# Patient Record
Sex: FEMALE | Race: WHITE | NOT HISPANIC OR LATINO | Employment: FULL TIME | URBAN - NONMETROPOLITAN AREA
[De-identification: names, ages, dates, MRNs, and addresses within clinical notes are randomized per-mention and may not be internally consistent; named-entity substitution may affect disease eponyms.]

---

## 2017-01-09 ENCOUNTER — TRANSCRIBE ORDERS (OUTPATIENT)
Dept: ADMINISTRATIVE | Facility: HOSPITAL | Age: 62
End: 2017-01-09

## 2017-01-09 ENCOUNTER — LAB (OUTPATIENT)
Dept: LAB | Facility: HOSPITAL | Age: 62
End: 2017-01-09

## 2017-01-09 DIAGNOSIS — R50.9 FEVER, UNSPECIFIED: ICD-10-CM

## 2017-01-09 DIAGNOSIS — R50.9 FEVER, UNSPECIFIED: Primary | ICD-10-CM

## 2017-01-09 LAB
FLUAV AG NPH QL: NEGATIVE
FLUBV AG NPH QL IA: NEGATIVE

## 2017-01-09 PROCEDURE — 87804 INFLUENZA ASSAY W/OPTIC: CPT

## 2017-04-25 ENCOUNTER — LAB (OUTPATIENT)
Dept: LAB | Facility: HOSPITAL | Age: 62
End: 2017-04-25

## 2017-04-25 ENCOUNTER — TRANSCRIBE ORDERS (OUTPATIENT)
Dept: LAB | Facility: HOSPITAL | Age: 62
End: 2017-04-25

## 2017-04-25 DIAGNOSIS — I10 ESSENTIAL (PRIMARY) HYPERTENSION: Primary | ICD-10-CM

## 2017-04-25 DIAGNOSIS — I10 ESSENTIAL (PRIMARY) HYPERTENSION: ICD-10-CM

## 2017-04-25 LAB
ALBUMIN SERPL-MCNC: 4.6 G/DL (ref 3.5–5)
ALBUMIN/GLOB SERPL: 1.2 G/DL (ref 1.1–2.5)
ALP SERPL-CCNC: 97 U/L (ref 24–120)
ALT SERPL W P-5'-P-CCNC: 26 U/L (ref 0–54)
ANION GAP SERPL CALCULATED.3IONS-SCNC: 13 MMOL/L (ref 4–13)
AST SERPL-CCNC: 26 U/L (ref 7–45)
AUTO MIXED CELLS #: 1 10*3/UL (ref 0.1–2.6)
AUTO MIXED CELLS %: 7.7 % (ref 0.1–24)
BILIRUB SERPL-MCNC: 0.5 MG/DL (ref 0.1–1)
BILIRUB UR QL STRIP: NEGATIVE
BUN BLD-MCNC: 19 MG/DL (ref 5–21)
BUN/CREAT SERPL: 18.6
CALCIUM SPEC-SCNC: 9.8 MG/DL (ref 8.4–10.4)
CHLORIDE SERPL-SCNC: 101 MMOL/L (ref 98–110)
CHOLEST SERPL-MCNC: 242 MG/DL (ref 130–200)
CLARITY UR: CLEAR
CO2 SERPL-SCNC: 28 MMOL/L (ref 24–31)
COLOR UR: YELLOW
CREAT BLD-MCNC: 1.02 MG/DL (ref 0.5–1.4)
ERYTHROCYTE [DISTWIDTH] IN BLOOD BY AUTOMATED COUNT: 12.8 % (ref 12–15)
GFR SERPL CREATININE-BSD FRML MDRD: 55 ML/MIN/1.73
GLOBULIN UR ELPH-MCNC: 3.7 GM/DL
GLUCOSE BLD-MCNC: 90 MG/DL (ref 70–100)
GLUCOSE UR STRIP-MCNC: NEGATIVE MG/DL
HBA1C MFR BLD: 5.8 %
HCT VFR BLD AUTO: 42 % (ref 37–47)
HDLC SERPL-MCNC: 45 MG/DL
HGB BLD-MCNC: 14.5 G/DL (ref 12–16)
HGB UR QL STRIP.AUTO: NEGATIVE
KETONES UR QL STRIP: NEGATIVE
LDLC SERPL CALC-MCNC: 136 MG/DL (ref 0–99)
LDLC/HDLC SERPL: 3.03 {RATIO}
LEUKOCYTE ESTERASE UR QL STRIP.AUTO: NEGATIVE
LYMPHOCYTES # BLD AUTO: 3.2 10*3/MM3 (ref 0.8–7)
LYMPHOCYTES NFR BLD AUTO: 25.7 % (ref 15–45)
MCH RBC QN AUTO: 30.2 PG (ref 28–32)
MCHC RBC AUTO-ENTMCNC: 34.5 G/DL (ref 33–36)
MCV RBC AUTO: 87.5 FL (ref 82–98)
NEUTROPHILS # BLD AUTO: 8.2 10*3/MM3 (ref 1.5–8.3)
NEUTROPHILS NFR BLD AUTO: 66.6 % (ref 39–78)
NITRITE UR QL STRIP: NEGATIVE
PH UR STRIP.AUTO: 6.5 [PH] (ref 5–8)
PLATELET # BLD AUTO: 278 10*3/MM3 (ref 130–400)
PMV BLD AUTO: 10.2 FL (ref 6–12)
POTASSIUM BLD-SCNC: 3.7 MMOL/L (ref 3.5–5.3)
PROT SERPL-MCNC: 8.3 G/DL (ref 6.3–8.7)
PROT UR QL STRIP: NEGATIVE
RBC # BLD AUTO: 4.8 10*6/MM3 (ref 4.2–5.4)
SODIUM BLD-SCNC: 142 MMOL/L (ref 135–145)
SP GR UR STRIP: 1.02 (ref 1–1.03)
TRIGL SERPL-MCNC: 303 MG/DL (ref 0–149)
UROBILINOGEN UR QL STRIP: NORMAL
VLDLC SERPL-MCNC: 60.6 MG/DL
WBC NRBC COR # BLD: 12.4 10*3/MM3 (ref 4.8–10.8)

## 2017-04-25 PROCEDURE — 83036 HEMOGLOBIN GLYCOSYLATED A1C: CPT

## 2017-04-25 PROCEDURE — 85025 COMPLETE CBC W/AUTO DIFF WBC: CPT

## 2017-04-25 PROCEDURE — 80053 COMPREHEN METABOLIC PANEL: CPT

## 2017-04-25 PROCEDURE — 80061 LIPID PANEL: CPT

## 2017-04-25 PROCEDURE — 81003 URINALYSIS AUTO W/O SCOPE: CPT

## 2017-04-25 PROCEDURE — 36415 COLL VENOUS BLD VENIPUNCTURE: CPT

## 2017-06-30 ENCOUNTER — TRANSCRIBE ORDERS (OUTPATIENT)
Dept: LAB | Facility: HOSPITAL | Age: 62
End: 2017-06-30

## 2017-06-30 ENCOUNTER — HOSPITAL ENCOUNTER (OUTPATIENT)
Dept: GENERAL RADIOLOGY | Facility: HOSPITAL | Age: 62
Discharge: HOME | End: 2017-06-30
Admitting: NURSE PRACTITIONER

## 2017-06-30 DIAGNOSIS — R07.81 PLEURITIC CHEST PAIN: ICD-10-CM

## 2017-06-30 DIAGNOSIS — R07.81 PLEURITIC CHEST PAIN: Primary | ICD-10-CM

## 2017-06-30 PROCEDURE — 71020 HC CHEST PA AND LATERAL: CPT

## 2018-09-05 ENCOUNTER — HOSPITAL ENCOUNTER (EMERGENCY)
Age: 63
Discharge: HOME | End: 2018-09-05
Payer: COMMERCIAL

## 2018-09-05 VITALS
BODY MASS INDEX: 28.93 KG/M2 | WEIGHT: 180 LBS | HEART RATE: 91 BPM | HEIGHT: 66 IN | SYSTOLIC BLOOD PRESSURE: 139 MMHG | RESPIRATION RATE: 18 BRPM | DIASTOLIC BLOOD PRESSURE: 84 MMHG | OXYGEN SATURATION: 96 % | TEMPERATURE: 98.3 F

## 2018-09-05 DIAGNOSIS — T78.40XA ALLERGIC REACTION, INITIAL ENCOUNTER: Primary | ICD-10-CM

## 2018-09-05 PROCEDURE — 99283 EMERGENCY DEPT VISIT LOW MDM: CPT | Performed by: PHYSICIAN ASSISTANT

## 2018-09-05 PROCEDURE — 96372 THER/PROPH/DIAG INJ SC/IM: CPT

## 2018-09-05 PROCEDURE — 6370000000 HC RX 637 (ALT 250 FOR IP): Performed by: PHYSICIAN ASSISTANT

## 2018-09-05 PROCEDURE — 99282 EMERGENCY DEPT VISIT SF MDM: CPT

## 2018-09-05 PROCEDURE — 6360000002 HC RX W HCPCS: Performed by: PHYSICIAN ASSISTANT

## 2018-09-05 RX ORDER — DEXAMETHASONE SODIUM PHOSPHATE 10 MG/ML
6 INJECTION, SOLUTION INTRAMUSCULAR; INTRAVENOUS ONCE
Status: COMPLETED | OUTPATIENT
Start: 2018-09-05 | End: 2018-09-05

## 2018-09-05 RX ORDER — DIPHENHYDRAMINE HYDROCHLORIDE 50 MG/ML
25 INJECTION INTRAMUSCULAR; INTRAVENOUS ONCE
Status: COMPLETED | OUTPATIENT
Start: 2018-09-05 | End: 2018-09-05

## 2018-09-05 RX ORDER — DIPHENHYDRAMINE HCL 25 MG
25 CAPSULE ORAL EVERY 6 HOURS PRN
Qty: 20 CAPSULE | Refills: 0 | Status: SHIPPED | OUTPATIENT
Start: 2018-09-05 | End: 2018-09-08

## 2018-09-05 RX ORDER — LISINOPRIL 20 MG/1
20 TABLET ORAL DAILY
COMMUNITY

## 2018-09-05 RX ORDER — PREDNISONE 10 MG/1
TABLET ORAL
Qty: 40 TABLET | Refills: 0 | Status: SHIPPED | OUTPATIENT
Start: 2018-09-05 | End: 2020-01-12

## 2018-09-05 RX ORDER — FLUOXETINE HYDROCHLORIDE 20 MG/1
20 CAPSULE ORAL DAILY
COMMUNITY

## 2018-09-05 RX ORDER — HYDROCODONE BITARTRATE AND ACETAMINOPHEN 5; 325 MG/1; MG/1
1 TABLET ORAL ONCE
Status: COMPLETED | OUTPATIENT
Start: 2018-09-05 | End: 2018-09-05

## 2018-09-05 RX ADMIN — HYDROCODONE BITARTRATE AND ACETAMINOPHEN 1 TABLET: 5; 325 TABLET ORAL at 07:24

## 2018-09-05 RX ADMIN — DEXAMETHASONE SODIUM PHOSPHATE 6 MG: 10 INJECTION, SOLUTION INTRAMUSCULAR; INTRAVENOUS at 07:18

## 2018-09-05 RX ADMIN — DIPHENHYDRAMINE HYDROCHLORIDE 25 MG: 50 INJECTION, SOLUTION INTRAMUSCULAR; INTRAVENOUS at 07:18

## 2018-09-05 ASSESSMENT — ENCOUNTER SYMPTOMS
ABDOMINAL DISTENTION: 0
EYE PAIN: 0
WHEEZING: 0
DIARRHEA: 0
NAUSEA: 0
SINUS PRESSURE: 0
FACIAL SWELLING: 1
SHORTNESS OF BREATH: 0
ABDOMINAL PAIN: 0
VOMITING: 0
COUGH: 0
RHINORRHEA: 0
SORE THROAT: 0
APNEA: 0
CONSTIPATION: 0
CHEST TIGHTNESS: 0

## 2018-09-05 ASSESSMENT — PAIN SCALES - GENERAL: PAINLEVEL_OUTOF10: 6

## 2018-09-05 NOTE — ED PROVIDER NOTES
eMERGENCY dEPARTMENT eNCOUnter      Pt Name: Clint Hansen  MRN: [id number]  [place] 1955  Date of evaluation: 9/5/2018  Provider: Debby Ramirez, [facility] (formerly [facility])       Chief Complaint   Patient presents with    Facial Swelling     began yesterday morning         HISTORY OF PRESENT ILLNESS  (Location/Symptom, Timing/Onset, Context/Setting, Quality, Duration, Modifying Factors, Severity.)   Clint Hansen is a 66 y.o. female who presents to the emergency department With facial swelling that began yesterday morning. Patient states she has been working outside in her yard with her . She typically overreacts when exposed to poison ivy. She does have some poison ivy rash on her right arm. She also takes lisinopril. She denies any shortness of breath, no difficulty breathing or significant swelling of the lips or tongue. Denies any visual disturbances. No fevers at home. Nursing Notes were reviewed and I agree. REVIEW OF SYSTEMS    (2-9 systems for level 4, 10 or more for level 5)     Review of Systems   Constitutional: Negative for activity change, chills, fatigue and fever. HENT: Positive for facial swelling. Negative for ear pain, hearing loss, postnasal drip, rhinorrhea, sinus pressure and sore throat. Eyes: Negative for pain and visual disturbance. Respiratory: Negative for apnea, cough, chest tightness, shortness of breath and wheezing. Cardiovascular: Negative for chest pain. Gastrointestinal: Negative for abdominal distention, abdominal pain, constipation, diarrhea, nausea and vomiting. Genitourinary: Negative for difficulty urinating, dysuria, flank pain and hematuria. Musculoskeletal: Negative for arthralgias and joint swelling. Skin: Negative for rash. Neurological: Negative for dizziness, syncope, light-headedness and headaches. Psychiatric/Behavioral: Negative for agitation and confusion.         Except as noted above the remainder of the review of systems was reviewed and negative. PAST MEDICAL HISTORY     Past Medical History:   Diagnosis Date    Hypertension          SURGICAL HISTORY       Past Surgical History:   Procedure Laterality Date     SECTION           CURRENT MEDICATIONS       Discharge Medication List as of 2018  7:37 AM      CONTINUE these medications which have NOT CHANGED    Details   lisinopril (PRINIVIL;ZESTRIL) 20 MG tablet Take 20 mg by mouth dailyHistorical Med      FLUoxetine (PROZAC) 20 MG capsule Take 20 mg by mouth dailyHistorical Med             ALLERGIES     Patient has no known allergies. FAMILY HISTORY     History reviewed. No pertinent family history. SOCIAL HISTORY       Social History     Social History    Marital status:      Spouse name: N/A    Number of children: N/A    Years of education: N/A     Social History Main Topics    Smoking status: Never Smoker    Smokeless tobacco: Never Used    Alcohol use No    Drug use: No    Sexual activity: Not Asked     Other Topics Concern    None     Social History Narrative    None       SCREENINGS           PHYSICAL EXAM    (up to 7 for level 4, 8 or more for level 5)     ED Triage Vitals [18 0702]   BP Temp Temp Source Pulse Resp SpO2 Height Weight   (!) 182/89 98.3 °F (36.8 °C) Oral 91 18 98 % 5' 6\" (1.676 m) 180 lb (81.6 kg)       Physical Exam   Constitutional: She is oriented to person, place, and time. She appears well-developed and well-nourished. No distress. HENT:   Head: Normocephalic and atraumatic. Head is with right periorbital erythema and with left periorbital erythema. Mouth/Throat: Uvula is midline and mucous membranes are normal. No oropharyngeal exudate, posterior oropharyngeal edema, posterior oropharyngeal erythema or tonsillar abscesses. Cardiovascular: Normal rate, regular rhythm and normal heart sounds. Exam reveals no gallop and no friction rub. No murmur heard.   Pulmonary/Chest: symptoms develop they are to return to the emergency department immediately. Patient was educated on diagnosis and treatment plan. All of patient's questions were answered, and the patient understands the discharge plan. I do not feel the patient has a life-threatening condition at this time. Patient is to be discharged.        PATIENT REFERRED TO:  Luz Maria Taylor MD  [address] [id number]  [phone]    Schedule an appointment as soon as possible for a visit   Discuss lisinopril use      DISCHARGE MEDICATIONS:  Discharge Medication List as of 9/5/2018  7:37 AM      START taking these medications    Details   diphenhydrAMINE (BENADRYL) 25 MG capsule Take 1 capsule by mouth every 6 hours as needed for Itching, Disp-20 capsule, R-0Print      predniSONE (DELTASONE) 10 MG tablet Take 5 tablets by mouth on days 1-2, Take 4 tablets by mouth on days 3-4, Take 3 tablets by mouth on days 5-6, Take 2 tablets by mouth on days 7-8, Take 1 tablets by mouth on days 9-10, Disp-40 tablet, R-0Print             (Please note that portions of this note were completed with a voice recognition program.  Efforts were made to edit the dictations but occasionally words are mis-transcribed.)    ÁLVARO Long PA  09/05/18 [id number]

## 2019-07-19 ENCOUNTER — LAB (OUTPATIENT)
Dept: LAB | Facility: HOSPITAL | Age: 64
End: 2019-07-19

## 2019-07-19 ENCOUNTER — TRANSCRIBE ORDERS (OUTPATIENT)
Dept: ADMINISTRATIVE | Facility: HOSPITAL | Age: 64
End: 2019-07-19

## 2019-07-19 DIAGNOSIS — I10 ESSENTIAL HYPERTENSION, MALIGNANT: ICD-10-CM

## 2019-07-19 DIAGNOSIS — I10 ESSENTIAL HYPERTENSION, MALIGNANT: Primary | ICD-10-CM

## 2019-07-19 LAB
ALBUMIN SERPL-MCNC: 4.3 G/DL (ref 3.5–5)
ALBUMIN/GLOB SERPL: 1.2 G/DL (ref 1.1–2.5)
ALP SERPL-CCNC: 110 U/L (ref 24–120)
ALT SERPL W P-5'-P-CCNC: 23 U/L (ref 0–54)
ANION GAP SERPL CALCULATED.3IONS-SCNC: 7 MMOL/L (ref 4–13)
AST SERPL-CCNC: 23 U/L (ref 7–45)
AUTO MIXED CELLS #: 1.3 10*3/MM3 (ref 0.1–2.6)
AUTO MIXED CELLS %: 12.7 % (ref 0.1–24)
BACTERIA UR QL AUTO: ABNORMAL /HPF
BILIRUB SERPL-MCNC: 0.4 MG/DL (ref 0.1–1)
BILIRUB UR QL STRIP: NEGATIVE
BUN BLD-MCNC: 12 MG/DL (ref 5–21)
BUN/CREAT SERPL: 15.8
CALCIUM SPEC-SCNC: 9.7 MG/DL (ref 8.4–10.4)
CHLORIDE SERPL-SCNC: 106 MMOL/L (ref 98–110)
CHOLEST SERPL-MCNC: 260 MG/DL (ref 130–200)
CLARITY UR: CLEAR
CO2 SERPL-SCNC: 26 MMOL/L (ref 24–31)
COLOR UR: YELLOW
CREAT BLD-MCNC: 0.76 MG/DL (ref 0.5–1.4)
ERYTHROCYTE [DISTWIDTH] IN BLOOD BY AUTOMATED COUNT: 13.2 % (ref 12–15)
GFR SERPL CREATININE-BSD FRML MDRD: 77 ML/MIN/1.73
GLOBULIN UR ELPH-MCNC: 3.5 GM/DL
GLUCOSE BLD-MCNC: 102 MG/DL (ref 70–100)
GLUCOSE UR STRIP-MCNC: NEGATIVE MG/DL
HBA1C MFR BLD: 5.9 %
HCT VFR BLD AUTO: 42.9 % (ref 37–47)
HDLC SERPL-MCNC: 43 MG/DL
HGB BLD-MCNC: 14.4 G/DL (ref 12–16)
HGB UR QL STRIP.AUTO: NEGATIVE
HYALINE CASTS UR QL AUTO: ABNORMAL /LPF
KETONES UR QL STRIP: NEGATIVE
LDLC SERPL CALC-MCNC: 159 MG/DL (ref 0–99)
LDLC/HDLC SERPL: 3.69 {RATIO}
LEUKOCYTE ESTERASE UR QL STRIP.AUTO: ABNORMAL
LYMPHOCYTES # BLD AUTO: 2.2 10*3/MM3 (ref 0.7–3.1)
LYMPHOCYTES NFR BLD AUTO: 20.8 % (ref 15–45)
MCH RBC QN AUTO: 29 PG (ref 28–32)
MCHC RBC AUTO-ENTMCNC: 33.6 G/DL (ref 33–36)
MCV RBC AUTO: 86.3 FL (ref 82–98)
NEUTROPHILS # BLD AUTO: 6.9 10*3/MM3 (ref 1.5–8.3)
NEUTROPHILS NFR BLD AUTO: 66.5 % (ref 39–78)
NITRITE UR QL STRIP: NEGATIVE
PH UR STRIP.AUTO: 6 [PH] (ref 5–8)
PLATELET # BLD AUTO: 295 10*3/MM3 (ref 130–400)
PMV BLD AUTO: 10.1 FL (ref 6–12)
POTASSIUM BLD-SCNC: 4.1 MMOL/L (ref 3.5–5.3)
PROT SERPL-MCNC: 7.8 G/DL (ref 6.3–8.7)
PROT UR QL STRIP: NEGATIVE
RBC # BLD AUTO: 4.97 10*6/MM3 (ref 4.2–5.4)
RBC # UR: ABNORMAL /HPF
REF LAB TEST METHOD: ABNORMAL
SODIUM BLD-SCNC: 139 MMOL/L (ref 135–145)
SP GR UR STRIP: 1.02 (ref 1–1.03)
SQUAMOUS #/AREA URNS HPF: ABNORMAL /HPF
TRIGL SERPL-MCNC: 291 MG/DL (ref 0–149)
TSH SERPL DL<=0.05 MIU/L-ACNC: 3.79 MIU/ML (ref 0.47–4.68)
UROBILINOGEN UR QL STRIP: ABNORMAL
VLDLC SERPL-MCNC: 58.2 MG/DL
WBC NRBC COR # BLD: 10.4 10*3/MM3 (ref 4.8–10.8)
WBC UR QL AUTO: ABNORMAL /HPF

## 2019-07-19 PROCEDURE — 83036 HEMOGLOBIN GLYCOSYLATED A1C: CPT

## 2019-07-19 PROCEDURE — 80061 LIPID PANEL: CPT

## 2019-07-19 PROCEDURE — 80053 COMPREHEN METABOLIC PANEL: CPT | Performed by: PEDIATRICS

## 2019-07-19 PROCEDURE — 36415 COLL VENOUS BLD VENIPUNCTURE: CPT | Performed by: PEDIATRICS

## 2019-07-19 PROCEDURE — 84443 ASSAY THYROID STIM HORMONE: CPT | Performed by: PEDIATRICS

## 2019-07-19 PROCEDURE — 85025 COMPLETE CBC W/AUTO DIFF WBC: CPT | Performed by: PEDIATRICS

## 2019-07-19 PROCEDURE — 81001 URINALYSIS AUTO W/SCOPE: CPT

## 2020-01-12 ENCOUNTER — APPOINTMENT (OUTPATIENT)
Dept: GENERAL RADIOLOGY | Age: 65
End: 2020-01-12

## 2020-01-12 ENCOUNTER — HOSPITAL ENCOUNTER (EMERGENCY)
Age: 65
Discharge: HOME | End: 2020-01-12

## 2020-01-12 VITALS
HEART RATE: 88 BPM | OXYGEN SATURATION: 95 % | SYSTOLIC BLOOD PRESSURE: 148 MMHG | TEMPERATURE: 98.2 F | HEIGHT: 66 IN | RESPIRATION RATE: 18 BRPM | DIASTOLIC BLOOD PRESSURE: 89 MMHG | WEIGHT: 180 LBS | BODY MASS INDEX: 28.93 KG/M2

## 2020-01-12 LAB
D DIMER: 0.66 UG/ML FEU (ref 0–0.48)
TROPONIN: <0.01 NG/ML (ref 0–0.03)

## 2020-01-12 PROCEDURE — 99284 EMERGENCY DEPT VISIT MOD MDM: CPT

## 2020-01-12 PROCEDURE — 99283 EMERGENCY DEPT VISIT LOW MDM: CPT

## 2020-01-12 PROCEDURE — 36415 COLL VENOUS BLD VENIPUNCTURE: CPT

## 2020-01-12 PROCEDURE — 73080 X-RAY EXAM OF ELBOW: CPT

## 2020-01-12 PROCEDURE — 93971 EXTREMITY STUDY: CPT

## 2020-01-12 PROCEDURE — 85379 FIBRIN DEGRADATION QUANT: CPT

## 2020-01-12 PROCEDURE — 84484 ASSAY OF TROPONIN QUANT: CPT

## 2020-01-12 PROCEDURE — 93005 ELECTROCARDIOGRAM TRACING: CPT | Performed by: NURSE PRACTITIONER

## 2020-01-12 RX ORDER — NAPROXEN 500 MG/1
500 TABLET ORAL 2 TIMES DAILY WITH MEALS
Qty: 30 TABLET | Refills: 0 | Status: SHIPPED | OUTPATIENT
Start: 2020-01-12

## 2020-01-12 ASSESSMENT — ENCOUNTER SYMPTOMS: SHORTNESS OF BREATH: 0

## 2020-01-12 ASSESSMENT — PAIN SCALES - GENERAL: PAINLEVEL_OUTOF10: 6

## 2020-01-13 LAB
EKG P AXIS: 29 DEGREES
EKG P-R INTERVAL: 142 MS
EKG Q-T INTERVAL: 382 MS
EKG QRS DURATION: 128 MS
EKG QTC CALCULATION (BAZETT): 434 MS
EKG T AXIS: -4 DEGREES

## 2020-01-13 PROCEDURE — 93010 ELECTROCARDIOGRAM REPORT: CPT | Performed by: INTERNAL MEDICINE

## 2020-01-13 NOTE — ED PROVIDER NOTES
[facility]  eMERGENCY dEPARTMENT eNCOUnter      Pt Name: Deneen Chrsitie  MRN: [id number]  [place] 1955  Date of evaluation: 2020  Provider: AIMEE Esparza    CHIEF COMPLAINT       Chief Complaint   Patient presents with    Arm Pain     Pt c/o left arm pain from unknown cause that began today         HISTORY OF PRESENT ILLNESS   (Location/Symptom, Timing/Onset,Context/Setting, Quality, Duration, Modifying Factors, Severity)  Note limiting factors. Deneen Christie is a 60 y.o. female who presents to the emergency department with left elbow pain that started today. Patient denies any injury. She has been lifting her grandson but does this usually. Her  is concerned about an MI. Denies any chest pain. She is not short of breath. No Cardiac history. The history is provided by the patient. Arm Pain   This is a new problem. The current episode started 12 to 24 hours ago. The problem occurs constantly. The problem has not changed since onset. Pertinent negatives include no chest pain and no shortness of breath. The symptoms are aggravated by bending. NursingNotes were reviewed. REVIEW OF SYSTEMS    (2-9 systems for level 4, 10 or more for level 5)     Review of Systems   Respiratory: Negative for shortness of breath. Cardiovascular: Negative for chest pain. Musculoskeletal: Positive for arthralgias and joint swelling. Except as noted above the remainder of the review of systems was reviewed and negative.        PAST MEDICAL HISTORY     Past Medical History:   Diagnosis Date    Hypertension          SURGICALHISTORY       Past Surgical History:   Procedure Laterality Date     SECTION           CURRENT MEDICATIONS       Discharge Medication List as of 2020 10:48 PM      CONTINUE these medications which have NOT CHANGED    Details   lisinopril (PRINIVIL;ZESTRIL) 20 MG tablet Take 20 mg by mouth dailyHistorical Med      FLUoxetine (PROZAC) 20 MG

## 2020-08-07 ENCOUNTER — LAB (OUTPATIENT)
Dept: LAB | Facility: HOSPITAL | Age: 65
End: 2020-08-07

## 2020-08-07 ENCOUNTER — TRANSCRIBE ORDERS (OUTPATIENT)
Dept: ADMINISTRATIVE | Facility: HOSPITAL | Age: 65
End: 2020-08-07

## 2020-08-07 DIAGNOSIS — I10 ESSENTIAL HYPERTENSION: Primary | ICD-10-CM

## 2020-08-07 LAB
ALBUMIN SERPL-MCNC: 4.5 G/DL (ref 3.5–5)
ALBUMIN/GLOB SERPL: 1.2 G/DL (ref 1.1–2.5)
ALP SERPL-CCNC: 98 U/L (ref 24–120)
ALT SERPL W P-5'-P-CCNC: 36 U/L (ref 0–35)
ANION GAP SERPL CALCULATED.3IONS-SCNC: 7 MMOL/L (ref 4–13)
AST SERPL-CCNC: 31 U/L (ref 7–45)
AUTO MIXED CELLS #: 1 10*3/MM3 (ref 0.1–2.6)
AUTO MIXED CELLS %: 9.3 % (ref 0.1–24)
BILIRUB SERPL-MCNC: 0.4 MG/DL (ref 0.1–1)
BUN SERPL-MCNC: 13 MG/DL (ref 5–21)
BUN/CREAT SERPL: 15.9
CALCIUM SPEC-SCNC: 10.1 MG/DL (ref 8.4–10.4)
CHLORIDE SERPL-SCNC: 103 MMOL/L (ref 98–110)
CHOLEST SERPL-MCNC: 273 MG/DL (ref 130–200)
CO2 SERPL-SCNC: 28 MMOL/L (ref 24–31)
CREAT SERPL-MCNC: 0.82 MG/DL (ref 0.5–1.4)
ERYTHROCYTE [DISTWIDTH] IN BLOOD BY AUTOMATED COUNT: 13.2 % (ref 12.3–15.4)
GFR SERPL CREATININE-BSD FRML MDRD: 70 ML/MIN/1.73
GLOBULIN UR ELPH-MCNC: 3.9 GM/DL
GLUCOSE SERPL-MCNC: 107 MG/DL (ref 70–100)
HCT VFR BLD AUTO: 44.7 % (ref 34–46.6)
HDLC SERPL-MCNC: 45 MG/DL
HGB BLD-MCNC: 14.8 G/DL (ref 12–15.9)
LDLC SERPL CALC-MCNC: 202 MG/DL (ref 0–99)
LDLC/HDLC SERPL: 4.48 {RATIO}
LYMPHOCYTES # BLD AUTO: 2.6 10*3/MM3 (ref 0.7–3.1)
LYMPHOCYTES NFR BLD AUTO: 25.4 % (ref 19.6–45.3)
MCH RBC QN AUTO: 29 PG (ref 26.6–33)
MCHC RBC AUTO-ENTMCNC: 33.1 G/DL (ref 31.5–35.7)
MCV RBC AUTO: 87.5 FL (ref 79–97)
NEUTROPHILS NFR BLD AUTO: 6.7 10*3/MM3 (ref 1.7–7)
NEUTROPHILS NFR BLD AUTO: 65.3 % (ref 42.7–76)
PLATELET # BLD AUTO: 274 10*3/MM3 (ref 140–450)
PMV BLD AUTO: 10.4 FL (ref 6–12)
POTASSIUM SERPL-SCNC: 4.4 MMOL/L (ref 3.5–5.3)
PROT SERPL-MCNC: 8.4 G/DL (ref 6.3–8.7)
RBC # BLD AUTO: 5.11 10*6/MM3 (ref 3.77–5.28)
SODIUM SERPL-SCNC: 138 MMOL/L (ref 135–145)
TRIGL SERPL-MCNC: 131 MG/DL (ref 0–149)
VLDLC SERPL-MCNC: 26.2 MG/DL
WBC # BLD AUTO: 10.3 10*3/MM3 (ref 3.4–10.8)

## 2020-08-07 PROCEDURE — 80053 COMPREHEN METABOLIC PANEL: CPT | Performed by: NURSE PRACTITIONER

## 2020-08-07 PROCEDURE — 85025 COMPLETE CBC W/AUTO DIFF WBC: CPT | Performed by: NURSE PRACTITIONER

## 2020-08-07 PROCEDURE — 36415 COLL VENOUS BLD VENIPUNCTURE: CPT | Performed by: NURSE PRACTITIONER

## 2020-08-07 PROCEDURE — 80061 LIPID PANEL: CPT | Performed by: NURSE PRACTITIONER

## 2020-11-27 PROCEDURE — 87635 SARS-COV-2 COVID-19 AMP PRB: CPT | Performed by: NURSE PRACTITIONER

## 2021-04-29 ENCOUNTER — OFFICE VISIT (OUTPATIENT)
Dept: FAMILY MEDICINE CLINIC | Facility: CLINIC | Age: 66
End: 2021-04-29

## 2021-04-29 VITALS
HEIGHT: 66 IN | RESPIRATION RATE: 20 BRPM | DIASTOLIC BLOOD PRESSURE: 86 MMHG | BODY MASS INDEX: 29.89 KG/M2 | WEIGHT: 186 LBS | SYSTOLIC BLOOD PRESSURE: 148 MMHG | HEART RATE: 98 BPM | TEMPERATURE: 97.8 F

## 2021-04-29 DIAGNOSIS — M25.561 ACUTE PAIN OF RIGHT KNEE: ICD-10-CM

## 2021-04-29 DIAGNOSIS — I10 ESSENTIAL HYPERTENSION: Primary | ICD-10-CM

## 2021-04-29 DIAGNOSIS — F41.8 MIXED ANXIETY AND DEPRESSIVE DISORDER: ICD-10-CM

## 2021-04-29 DIAGNOSIS — E78.00 HYPERCHOLESTEREMIA: ICD-10-CM

## 2021-04-29 DIAGNOSIS — E78.00 ELEVATED LDL CHOLESTEROL LEVEL: ICD-10-CM

## 2021-04-29 PROCEDURE — 99214 OFFICE O/P EST MOD 30 MIN: CPT | Performed by: NURSE PRACTITIONER

## 2021-04-29 RX ORDER — LISINOPRIL AND HYDROCHLOROTHIAZIDE 25; 20 MG/1; MG/1
1 TABLET ORAL DAILY
OUTPATIENT
Start: 2021-04-29

## 2021-04-29 RX ORDER — FLUOXETINE HYDROCHLORIDE 40 MG/1
20 CAPSULE ORAL DAILY
OUTPATIENT
Start: 2021-04-29

## 2021-04-29 RX ORDER — METHYLPREDNISOLONE 4 MG/1
TABLET ORAL
Qty: 21 TABLET | Refills: 0 | Status: SHIPPED | OUTPATIENT
Start: 2021-04-29 | End: 2021-08-26 | Stop reason: SDUPTHER

## 2021-04-29 RX ORDER — ATORVASTATIN CALCIUM 10 MG/1
10 TABLET, FILM COATED ORAL NIGHTLY
Qty: 30 TABLET | Refills: 5 | Status: SHIPPED | OUTPATIENT
Start: 2021-04-29 | End: 2022-01-04 | Stop reason: SDUPTHER

## 2021-04-29 RX ORDER — LISINOPRIL AND HYDROCHLOROTHIAZIDE 25; 20 MG/1; MG/1
1 TABLET ORAL EVERY MORNING
Qty: 30 TABLET | Refills: 5 | Status: SHIPPED | OUTPATIENT
Start: 2021-04-29 | End: 2022-01-04 | Stop reason: SDUPTHER

## 2021-04-29 RX ORDER — FLUOXETINE HYDROCHLORIDE 20 MG/1
20 CAPSULE ORAL DAILY
Qty: 30 CAPSULE | Refills: 5 | Status: SHIPPED | OUTPATIENT
Start: 2021-04-29 | End: 2022-01-04 | Stop reason: SDUPTHER

## 2021-04-29 NOTE — TELEPHONE ENCOUNTER
Caller: Michelle Berg    Relationship: Self    Best call back number: [phone]    Medication needed:   Requested Prescriptions     Pending Prescriptions Disp Refills   • lisinopril-hydrochlorothiazide (PRINZIDE,ZESTORETIC) 20-25 MG per tablet       Sig: Take 1 tablet by mouth Daily.   • FLUoxetine (PROzac) 40 MG capsule       Sig: Take 1 capsule by mouth Daily.       When do you need the refill by: PATIENT IS OUT    What additional details did the patient provide when requesting the medication: PATIENT IS WANTING TO BE ADVISED IF SHE IS NEEDING TO BE SEEN FOR REFILL    Does the patient have less than a 3 day supply:  [x] Yes  [] No    What is the patient's preferred pharmacy: [facility] [address] [place] [address] [address] [place] [address] [phone] PH - [phone]

## 2021-04-29 NOTE — PROGRESS NOTES
"Chief Complaint  Anxiety and Hypertension    Subjective    History of Present Illness      Patient presents to [facility] for   Pt states that she is here for a f/u with anxiety and hypertension. Pt states that he anxiety is under control ans says her blood pressure has been running fine.    Anxiety  Presents for follow-up visit. The quality of sleep is good.       Hypertension  Associated symptoms include anxiety.        Review of Systems   Constitutional: Negative.    HENT: Negative.    Eyes: Negative.    Respiratory: Negative.    Cardiovascular: Negative.    Gastrointestinal: Negative.    Endocrine: Negative.    Genitourinary: Negative.    Musculoskeletal: Positive for arthralgias.   Skin: Negative.    Allergic/Immunologic: Negative.    Neurological: Negative.    Hematological: Negative.    Psychiatric/Behavioral: Negative.        I have reviewed and agree with the HPI and ROS information as above.  JAIME Jj     Objective   Vital Signs:   /86   Pulse 98   Temp 97.8 °F (36.6 °C)   Resp 20   Ht 167.6 cm (66\")   Wt 84.4 kg (186 lb)   BMI 30.02 kg/m²       Physical Exam  Constitutional:       Appearance: She is well-developed. She is obese.   HENT:      Head: Normocephalic and atraumatic.      Right Ear: Tympanic membrane, ear canal and external ear normal.      Left Ear: Tympanic membrane, ear canal and external ear normal.      Nose: Nose normal. No septal deviation, nasal tenderness or congestion.      Mouth/Throat:      Lips: Pink. No lesions.      Mouth: Mucous membranes are moist. No oral lesions.      Dentition: Normal dentition.      Pharynx: Oropharynx is clear. No pharyngeal swelling, oropharyngeal exudate or posterior oropharyngeal erythema.   Eyes:      General: Lids are normal. Vision grossly intact. No scleral icterus.        Right eye: No discharge.         Left eye: No discharge.      Extraocular Movements: Extraocular movements intact.      " Conjunctiva/sclera: Conjunctivae normal.      Right eye: Right conjunctiva is not injected.      Left eye: Left conjunctiva is not injected.      Pupils: Pupils are equal, round, and reactive to light.   Neck:      Thyroid: No thyroid mass.      Trachea: Trachea normal.   Cardiovascular:      Rate and Rhythm: Normal rate and regular rhythm.      Heart sounds: Normal heart sounds. No murmur heard.   No gallop.    Pulmonary:      Effort: Pulmonary effort is normal.      Breath sounds: Normal breath sounds and air entry. No wheezing, rhonchi or rales.   Abdominal:      General: There is no distension.      Palpations: Abdomen is soft. There is no mass.      Tenderness: There is no abdominal tenderness. There is no right CVA tenderness, left CVA tenderness, guarding or rebound.   Musculoskeletal:         General: No tenderness (right knee, bilat lower patella bursa region) or deformity. Normal range of motion.      Cervical back: Full passive range of motion without pain, normal range of motion and neck supple.      Thoracic back: Normal.      Right lower leg: No edema.      Left lower leg: No edema.   Skin:     General: Skin is warm and dry.      Coloration: Skin is not jaundiced.      Findings: No rash.   Neurological:      Mental Status: She is alert and oriented to person, place, and time.      Cranial Nerves: Cranial nerves are intact.      Sensory: Sensation is intact.      Motor: Motor function is intact.      Coordination: Coordination is intact.      Gait: Gait is intact.      Deep Tendon Reflexes: Reflexes are normal and symmetric.   Psychiatric:         Mood and Affect: Mood and affect normal.         Judgment: Judgment normal.          Result Review  Data Reviewed:          CBC & Differential (08/07/2020 08:55)  Lipid Panel (08/07/2020 08:55)  Comprehensive Metabolic Panel (08/07/2020 08:55)           Assessment and Plan    Patient's Body mass index is 30.02 kg/m².     Problem List Items Addressed This Visit         Cardiac and Vasculature    Essential hypertension - Primary    Relevant Medications    lisinopril-hydrochlorothiazide (PRINZIDE,ZESTORETIC) 20-25 MG per tablet    Elevated LDL cholesterol level    Relevant Medications    atorvastatin (LIPITOR) 10 MG tablet       Mental Health    Mixed anxiety and depressive disorder    Relevant Medications    FLUoxetine (PROzac) 20 MG capsule      Other Visit Diagnoses     Hypercholesteremia        Relevant Medications    atorvastatin (LIPITOR) 10 MG tablet    Acute pain of right knee        Relevant Medications    methylPREDNISolone (MEDROL) 4 MG dose pack      1.  Continue same blood pressure treatment, she has not had her dose this morning.  Labs up-to-date until August.  She declines getting up-to-date on vaccinations and screenings due to affordability at this time.  2.  Mood is stable on Prozac.  3.  Reviewed her recent cholesterol panel with her and discussed her increased cardiovascular risk with LDL level.  Diet changes were encouraged, she is agreeable to start medication as long as it is affordable at this time.  Counseling was done we will start Lipitor and repeat lab in 6 months.  4.  We will treat right knee pain with steroid pack and encouraged brace as needed.          Follow Up   Return in about 6 months (around 10/29/2021).  Patient was given instructions and counseling regarding her condition or for health maintenance advice. Please see specific information pulled into the AVS if appropriate.

## 2021-08-26 ENCOUNTER — OFFICE VISIT (OUTPATIENT)
Dept: FAMILY MEDICINE CLINIC | Facility: CLINIC | Age: 66
End: 2021-08-26

## 2021-08-26 VITALS
RESPIRATION RATE: 20 BRPM | BODY MASS INDEX: 30.92 KG/M2 | WEIGHT: 192.4 LBS | HEIGHT: 66 IN | HEART RATE: 99 BPM | TEMPERATURE: 96.8 F | DIASTOLIC BLOOD PRESSURE: 84 MMHG | SYSTOLIC BLOOD PRESSURE: 152 MMHG

## 2021-08-26 DIAGNOSIS — T63.481A ALLERGIC REACTION TO INSECT STING, ACCIDENTAL OR UNINTENTIONAL, INITIAL ENCOUNTER: Primary | ICD-10-CM

## 2021-08-26 PROCEDURE — 99213 OFFICE O/P EST LOW 20 MIN: CPT | Performed by: NURSE PRACTITIONER

## 2021-08-26 PROCEDURE — 96372 THER/PROPH/DIAG INJ SC/IM: CPT | Performed by: NURSE PRACTITIONER

## 2021-08-26 RX ORDER — METHYLPREDNISOLONE 4 MG/1
TABLET ORAL
Qty: 1 EACH | Refills: 0 | Status: SHIPPED | OUTPATIENT
Start: 2021-08-26 | End: 2022-01-04

## 2021-08-26 RX ORDER — DEXAMETHASONE SODIUM PHOSPHATE 4 MG/ML
8 INJECTION, SOLUTION INTRA-ARTICULAR; INTRALESIONAL; INTRAMUSCULAR; INTRAVENOUS; SOFT TISSUE ONCE
Status: COMPLETED | OUTPATIENT
Start: 2021-08-26 | End: 2021-08-26

## 2021-08-26 RX ORDER — TRIAMCINOLONE ACETONIDE 1 MG/G
CREAM TOPICAL 2 TIMES DAILY
Qty: 45 G | Refills: 2 | Status: SHIPPED | OUTPATIENT
Start: 2021-08-26 | End: 2022-01-04

## 2021-08-26 RX ADMIN — DEXAMETHASONE SODIUM PHOSPHATE 8 MG: 4 INJECTION, SOLUTION INTRA-ARTICULAR; INTRALESIONAL; INTRAMUSCULAR; INTRAVENOUS; SOFT TISSUE at 11:10

## 2021-08-26 NOTE — PROGRESS NOTES
After obtaining consent, and per orders of Mariaelena SANDOVAL, injection of Dexamethasone 8 mg IM given in right buttock by Caryn Lyons RN. Patient instructed to remain in clinic for 20 minutes afterwards, and to report any adverse reaction to me immediately. Pt tolerated injection without difficulty.

## 2021-08-26 NOTE — PATIENT INSTRUCTIONS
Bee, Wasp, or Hornet Sting, Adult  Bees, wasps, and hornets are part of a family of insects that can sting people. These stings can cause pain and inflammation, but they are usually not serious. However, some people may have an allergic reaction to a sting. This can cause the symptoms to be more severe.  What increases the risk?  You may be at a greater risk of getting stung if you:  · Provoke a stinging insect by swatting or disturbing it.  · Wear strong-smelling soaps, deodorants, or body sprays.  · Spend time outdoors near gardens with flowers or fruit trees or in clothes that expose skin.  · Eat or drink outside.  What are the signs or symptoms?  Common symptoms of this condition include:  · A red lump in the skin that sometimes has a tiny hole in the center. In some cases, a stinger may be in the center of the wound.  · Pain and itching at the sting site.  · Redness and swelling around the sting site. If you have an allergic reaction (localized allergic reaction), the swelling and redness may spread out from the sting site. In some cases, this reaction can continue to develop over the next 24-48 hours.  In rare cases, a person may have a severe allergic reaction (anaphylactic reaction) to a sting. Symptoms of an anaphylactic reaction may include:  · Wheezing or difficulty breathing.  · Raised, itchy, red patches on the skin (hives).  · Nausea or vomiting.  · Abdominal cramping.  · Diarrhea.  · Tightness in the chest or chest pain.  · Dizziness or fainting.  · Redness of the face (flushing).  · Hoarse voice.  · Swollen tongue, lips, or face.  How is this diagnosed?  This condition is usually diagnosed based on your symptoms and medical history as well as a physical exam. You may have an allergy test to determine if you are allergic to the substance that the insect injected during the sting (venom).  How is this treated?  If you were stung by a bee, the stinger and a small sac of venom may be in the wound. It is  important to remove the stinger as soon as possible. You can do this by brushing across the wound with gauze, a fingernail, or a flat card such as a credit card. Removing the stinger can help reduce the severity of your body’s reaction to the sting.  Most stings can be treated with:  · Icing to reduce swelling in the area.  · Medicines (antihistamines) to treat itching or an allergic reaction.  · Medicines to help reduce pain. These may be medicines that you take by mouth, or medicated creams or lotions that you apply to your skin.  Pay close attention to your symptoms after you have been stung. If possible, have someone stay with you to make sure you do not have an allergic reaction. If you have any signs of an allergic reaction, call your health care provider. If you have ever had a severe allergic reaction, your health care provider may give you an inhaler or injectable medicine (epinephrine auto-injector) to use if necessary.  Follow these instructions at home:    · Wash the sting site 2-3 times each day with soap and water as told by your health care provider.  · Apply or take over-the-counter and prescription medicines only as told by your health care provider.  · If directed, apply ice to the sting area.  ? Put ice in a plastic bag.  ? Place a towel between your skin and the bag.  ? Leave the ice on for 20 minutes, 2-3 times a day.  · Do not scratch the sting area.  · If you had a severe allergic reaction to a sting, you may need:  ? To wear a medical bracelet or necklace that lists the allergy.  ? To learn when and how to use an anaphylaxis kit or epinephrine injection. Your family members and coworkers may also need to learn this.  ? To carry an anaphylaxis kit or epinephrine injection with you at all times.  How is this prevented?  · Avoid swatting at stinging insects and disturbing insect nests.  · Do not use fragrant soaps or lotions.  · Wear shoes, pants, and long sleeves when spending time outdoors,  especially in grassy areas where stinging insects are common.  · Keep outdoor areas free from nests or hives.  · Keep food and drink containers covered when eating outdoors.  · Avoid working or sitting near flowering plants, if possible.  · Wear gloves if you are gardening or working outdoors.  · If an attack by a stinging insect or a swarm seems likely in the moment, move away from the area or find a barrier between you and the insect(s), such as a door.  Contact a health care provider if:  · Your symptoms do not get better in 2-3 days.  · You have redness, swelling, or pain that spreads beyond the area of the sting.  · You have a fever.  Get help right away if:  You have symptoms of a severe allergic reaction. These include:  · Wheezing or difficulty breathing.  · Tightness in the chest or chest pain.  · Light-headedness or fainting.  · Itchy, raised, red patches on the skin.  · Nausea or vomiting.  · Abdominal cramping.  · Diarrhea.  · A swollen tongue or lips, or trouble swallowing.  · Dizziness or fainting.  Summary  · Stings from bees, wasps, and hornets can cause pain and inflammation, but they are usually not serious. However, some people may have an allergic reaction to a sting. This can cause the symptoms to be more severe.  · Pay close attention to your symptoms after you have been stung. If possible, have someone stay with you to make sure you do not have an allergic reaction.  · Call your health care provider if you have any signs of an allergic reaction.  This information is not intended to replace advice given to you by your health care provider. Make sure you discuss any questions you have with your health care provider.  Document Revised: 12/13/2018 Document Reviewed: 02/22/2018  Elsevier Patient Education © 2021 [employer]

## 2021-08-26 NOTE — PROGRESS NOTES
"Chief Complaint  Insect Bite (Stung on bilat FA)    Subjective          Michelle Berg presents to [facility]  Pt here for wasp stings to both arms, pt having redness, swelling and severe itching.    Insect Bite  This is a new problem. The current episode started in the past 7 days. The problem occurs constantly. The problem has been unchanged. Nothing aggravates the symptoms. She has tried nothing for the symptoms. The treatment provided no relief.       Objective   Vital Signs:   /84   Pulse 99   Temp 96.8 °F (36 °C)   Resp 20   Ht 167.6 cm (66\")   Wt 87.3 kg (192 lb 6.4 oz)   BMI 31.05 kg/m²     Physical Exam  Vitals and nursing note reviewed.   Constitutional:       Appearance: Normal appearance. She is well-developed.   HENT:      Head: Normocephalic and atraumatic.      Right Ear: Tympanic membrane, ear canal and external ear normal.      Left Ear: Tympanic membrane, ear canal and external ear normal.      Nose: Nose normal. No septal deviation, nasal tenderness or congestion.      Mouth/Throat:      Lips: Pink. No lesions.      Mouth: Mucous membranes are moist. No oral lesions.      Dentition: Normal dentition.      Pharynx: Oropharynx is clear. No pharyngeal swelling, oropharyngeal exudate or posterior oropharyngeal erythema.   Eyes:      General: Lids are normal. Vision grossly intact. No scleral icterus.        Right eye: No discharge.         Left eye: No discharge.      Extraocular Movements: Extraocular movements intact.      Conjunctiva/sclera: Conjunctivae normal.      Right eye: Right conjunctiva is not injected.      Left eye: Left conjunctiva is not injected.      Pupils: Pupils are equal, round, and reactive to light.   Neck:      Thyroid: No thyroid mass.      Trachea: Trachea normal.   Cardiovascular:      Rate and Rhythm: Normal rate and regular rhythm.      Heart sounds: Normal heart sounds. No murmur heard.   No gallop.    Pulmonary:      Effort: " Pulmonary effort is normal.      Breath sounds: Normal breath sounds and air entry. No wheezing, rhonchi or rales.   Musculoskeletal:         General: No tenderness or deformity. Normal range of motion.      Cervical back: Full passive range of motion without pain, normal range of motion and neck supple.      Thoracic back: Normal.      Right lower leg: No edema.      Left lower leg: No edema.   Skin:     General: Skin is warm and dry.      Coloration: Skin is not jaundiced.      Findings: Erythema and rash present.      Comments: Bilat forearms are red and swollen   Neurological:      Mental Status: She is alert and oriented to person, place, and time.      Cranial Nerves: Cranial nerves are intact.      Sensory: Sensation is intact.      Motor: Motor function is intact.      Coordination: Coordination is intact.      Gait: Gait is intact.      Deep Tendon Reflexes: Reflexes are normal and symmetric.   Psychiatric:         Mood and Affect: Mood and affect normal.         Behavior: Behavior normal.         Judgment: Judgment normal.             Assessment and Plan    Diagnoses and all orders for this visit:    1. Allergic reaction to insect sting, accidental or unintentional, initial encounter (Primary)  -     dexamethasone (DECADRON) injection 8 mg  -     methylPREDNISolone (MEDROL) 4 MG dose pack; Take as directed on package instructions.  Dispense: 1 each; Refill: 0  -     triamcinolone (KENALOG) 0.1 % cream; Apply  topically to the appropriate area as directed 2 (Two) Times a Day.  Dispense: 45 g; Refill: 2      Patient was stung 2 days ago on both her arms.  She states that she thought she could tough it out but her arms are becoming more painful, swollen, and red.  Patient specifically denies any shortness of breath or trouble breathing.  We will treat with steroids.  Follow-up as needed.  Follow Up   Return if symptoms worsen or fail to improve.  Patient was given instructions and counseling regarding her  condition or for health maintenance advice. Please see specific information pulled into the AVS if appropriate.

## 2022-01-04 ENCOUNTER — OFFICE VISIT (OUTPATIENT)
Dept: FAMILY MEDICINE CLINIC | Facility: CLINIC | Age: 67
End: 2022-01-04

## 2022-01-04 VITALS
TEMPERATURE: 97.3 F | HEIGHT: 66 IN | DIASTOLIC BLOOD PRESSURE: 88 MMHG | SYSTOLIC BLOOD PRESSURE: 146 MMHG | WEIGHT: 195 LBS | HEART RATE: 65 BPM | BODY MASS INDEX: 31.34 KG/M2 | RESPIRATION RATE: 20 BRPM

## 2022-01-04 DIAGNOSIS — E78.00 HYPERCHOLESTEREMIA: ICD-10-CM

## 2022-01-04 DIAGNOSIS — F41.8 MIXED ANXIETY AND DEPRESSIVE DISORDER: ICD-10-CM

## 2022-01-04 DIAGNOSIS — I10 ESSENTIAL HYPERTENSION: Primary | ICD-10-CM

## 2022-01-04 DIAGNOSIS — E78.00 ELEVATED LDL CHOLESTEROL LEVEL: ICD-10-CM

## 2022-01-04 DIAGNOSIS — E66.9 CLASS 1 OBESITY WITH BODY MASS INDEX (BMI) OF 31.0 TO 31.9 IN ADULT, UNSPECIFIED OBESITY TYPE, UNSPECIFIED WHETHER SERIOUS COMORBIDITY PRESENT: ICD-10-CM

## 2022-01-04 PROCEDURE — 99214 OFFICE O/P EST MOD 30 MIN: CPT | Performed by: NURSE PRACTITIONER

## 2022-01-04 RX ORDER — ATORVASTATIN CALCIUM 10 MG/1
10 TABLET, FILM COATED ORAL DAILY
Qty: 30 TABLET | Refills: 5 | Status: SHIPPED | OUTPATIENT
Start: 2022-01-04 | End: 2022-09-19 | Stop reason: SDUPTHER

## 2022-01-04 RX ORDER — FLUOXETINE HYDROCHLORIDE 20 MG/1
20 CAPSULE ORAL DAILY
Qty: 30 CAPSULE | Refills: 5 | Status: SHIPPED | OUTPATIENT
Start: 2022-01-04 | End: 2022-09-19 | Stop reason: SDUPTHER

## 2022-01-04 RX ORDER — BUPROPION HYDROCHLORIDE 150 MG/1
150 TABLET ORAL EVERY MORNING
Qty: 30 TABLET | Refills: 2 | Status: SHIPPED | OUTPATIENT
Start: 2022-01-04 | End: 2022-09-19 | Stop reason: SDUPTHER

## 2022-01-04 RX ORDER — LISINOPRIL AND HYDROCHLOROTHIAZIDE 25; 20 MG/1; MG/1
1 TABLET ORAL EVERY MORNING
Qty: 30 TABLET | Refills: 5 | Status: SHIPPED | OUTPATIENT
Start: 2022-01-04 | End: 2022-09-19 | Stop reason: SDUPTHER

## 2022-01-04 NOTE — PROGRESS NOTES
"Chief Complaint  f/u hyperension, f/u anxiety, f/u depression, and f/u with elevated LDLs    Subjective    History of Present Illness      Patient presents to [facility] for   Pt states that she is here for a f/u with hypertension, anxiety, depression and elevated LDLs.     Anxiety  Presents for follow-up visit. The quality of sleep is good.       Hypertension  This is a chronic problem. The problem is controlled. Associated symptoms include anxiety.        Review of Systems   Constitutional: Positive for unexpected weight gain.   All other systems reviewed and are negative.      I have reviewed and agree with the HPI and ROS information as above.  iTna Mishra, APRMISAEL     Objective   Vital Signs:   /88   Pulse 65   Temp 97.3 °F (36.3 °C)   Resp 20   Ht 167.6 cm (66\")   Wt 88.5 kg (195 lb)   BMI 31.47 kg/m²       Physical Exam  Constitutional:       Appearance: She is well-developed. She is obese.   HENT:      Head: Normocephalic and atraumatic.      Right Ear: Tympanic membrane, ear canal and external ear normal.      Left Ear: Tympanic membrane, ear canal and external ear normal.      Nose: Nose normal. No septal deviation, nasal tenderness or congestion.      Mouth/Throat:      Lips: Pink. No lesions.      Mouth: Mucous membranes are moist. No oral lesions.      Dentition: Normal dentition.      Pharynx: Oropharynx is clear. No pharyngeal swelling, oropharyngeal exudate or posterior oropharyngeal erythema.   Eyes:      General: Lids are normal. Vision grossly intact. No scleral icterus.        Right eye: No discharge.         Left eye: No discharge.      Extraocular Movements: Extraocular movements intact.      Conjunctiva/sclera: Conjunctivae normal.      Right eye: Right conjunctiva is not injected.      Left eye: Left conjunctiva is not injected.      Pupils: Pupils are equal, round, and reactive to light.   Neck:      Thyroid: No thyroid mass.      Trachea: Trachea " normal.   Cardiovascular:      Rate and Rhythm: Normal rate and regular rhythm.      Heart sounds: Normal heart sounds. No murmur heard.  No gallop.    Pulmonary:      Effort: Pulmonary effort is normal.      Breath sounds: Normal breath sounds and air entry. No wheezing, rhonchi or rales.   Abdominal:      General: There is no distension.      Palpations: Abdomen is soft. There is no mass.      Tenderness: There is no abdominal tenderness. There is no right CVA tenderness, left CVA tenderness, guarding or rebound.   Musculoskeletal:         General: No tenderness or deformity. Normal range of motion.      Cervical back: Full passive range of motion without pain, normal range of motion and neck supple.      Thoracic back: Normal.      Right lower leg: No edema.      Left lower leg: No edema.   Skin:     General: Skin is warm and dry.      Coloration: Skin is not jaundiced.      Findings: No rash.   Neurological:      Mental Status: She is alert and oriented to person, place, and time.      Cranial Nerves: Cranial nerves are intact.      Sensory: Sensation is intact.      Motor: Motor function is intact.      Coordination: Coordination is intact.      Gait: Gait is intact.      Deep Tendon Reflexes: Reflexes are normal and symmetric.   Psychiatric:         Mood and Affect: Mood and affect normal.         Judgment: Judgment normal.          Result Review  Data Reviewed:              Lipid Panel (08/07/2020 08:55)       Assessment and Plan      Problem List Items Addressed This Visit        Cardiac and Vasculature    Essential hypertension - Primary    Relevant Medications    lisinopril-hydrochlorothiazide (PRINZIDE,ZESTORETIC) 20-25 MG per tablet    Elevated LDL cholesterol level    Relevant Medications    atorvastatin (LIPITOR) 10 MG tablet       Mental Health    Mixed anxiety and depressive disorder    Relevant Medications    FLUoxetine (PROzac) 20 MG capsule    buPROPion XL (Wellbutrin XL) 150 MG 24 hr tablet       Other Visit Diagnoses     Hypercholesteremia        Relevant Medications    atorvastatin (LIPITOR) 10 MG tablet    Class 1 obesity with body mass index (BMI) of 31.0 to 31.9 in adult, unspecified obesity type, unspecified whether serious comorbidity present          Here for chronic medication follow up. Admits she is not compliant with her bp or cholesterol treatment. Self pay at this time and is asking to not do labs until May when her insurance is active. She does admit to inability to lose weight, lack of motivation.   Plan:   1. Continue Zestoretic.   2. Continue fluoxetine, add Wellbutrin xl. Medication counseling done. S/e discussed. Denies any HI, SI.  3. Restart lipitor, lab at wellness visit in June.   4. Weight loss encouraged, diet changes, physical activity encouraged, start Wellbutrin.            Follow Up   Return for f/u 1-3 months; medicare physical in June 2022. .  Patient was given instructions and counseling regarding her condition or for health maintenance advice. Please see specific information pulled into the AVS if appropriate.

## 2022-01-04 NOTE — PATIENT INSTRUCTIONS
"Hypertension, Adult  High blood pressure (hypertension) is when the force of blood pumping through the arteries is too strong. The arteries are the blood vessels that carry blood from the heart throughout the body. Hypertension forces the heart to work harder to pump blood and may cause arteries to become narrow or stiff. Untreated or uncontrolled hypertension can cause a heart attack, heart failure, a stroke, kidney disease, and other problems.  A blood pressure reading consists of a higher number over a lower number. Ideally, your blood pressure should be below 120/80. The first (\"top\") number is called the systolic pressure. It is a measure of the pressure in your arteries as your heart beats. The second (\"bottom\") number is called the diastolic pressure. It is a measure of the pressure in your arteries as the heart relaxes.  What are the causes?  The exact cause of this condition is not known. There are some conditions that result in or are related to high blood pressure.  What increases the risk?  Some risk factors for high blood pressure are under your control. The following factors may make you more likely to develop this condition:  · Smoking.  · Having type 2 diabetes mellitus, high cholesterol, or both.  · Not getting enough exercise or physical activity.  · Being overweight.  · Having too much fat, sugar, calories, or salt (sodium) in your diet.  · Drinking too much alcohol.  Some risk factors for high blood pressure may be difficult or impossible to change. Some of these factors include:  · Having chronic kidney disease.  · Having a family history of high blood pressure.  · Age. Risk increases with age.  · Race. You may be at higher risk if you are .  · Gender. Men are at higher risk than women before age 45. After age 65, women are at higher risk than men.  · Having obstructive sleep apnea.  · Stress.  What are the signs or symptoms?  High blood pressure may not cause symptoms. Very high " blood pressure (hypertensive crisis) may cause:  · Headache.  · Anxiety.  · Shortness of breath.  · Nosebleed.  · Nausea and vomiting.  · Vision changes.  · Severe chest pain.  · Seizures.  How is this diagnosed?  This condition is diagnosed by measuring your blood pressure while you are seated, with your arm resting on a flat surface, your legs uncrossed, and your feet flat on the floor. The cuff of the blood pressure monitor will be placed directly against the skin of your upper arm at the level of your heart. It should be measured at least twice using the same arm. Certain conditions can cause a difference in blood pressure between your right and left arms.  Certain factors can cause blood pressure readings to be lower or higher than normal for a short period of time:  · When your blood pressure is higher when you are in a health care provider's office than when you are at home, this is called white coat hypertension. Most people with this condition do not need medicines.  · When your blood pressure is higher at home than when you are in a health care provider's office, this is called masked hypertension. Most people with this condition may need medicines to control blood pressure.  If you have a high blood pressure reading during one visit or you have normal blood pressure with other risk factors, you may be asked to:  · Return on a different day to have your blood pressure checked again.  · Monitor your blood pressure at home for 1 week or longer.  If you are diagnosed with hypertension, you may have other blood or imaging tests to help your health care provider understand your overall risk for other conditions.  How is this treated?  This condition is treated by making healthy lifestyle changes, such as eating healthy foods, exercising more, and reducing your alcohol intake. Your health care provider may prescribe medicine if lifestyle changes are not enough to get your blood pressure under control, and  if:  · Your systolic blood pressure is above 130.  · Your diastolic blood pressure is above 80.  Your personal target blood pressure may vary depending on your medical conditions, your age, and other factors.  Follow these instructions at home:  Eating and drinking    · Eat a diet that is high in fiber and potassium, and low in sodium, added sugar, and fat. An example eating plan is called the DASH (Dietary Approaches to Stop Hypertension) diet. To eat this way:  ? Eat plenty of fresh fruits and vegetables. Try to fill one half of your plate at each meal with fruits and vegetables.  ? Eat whole grains, such as whole-wheat pasta, brown rice, or whole-grain bread. Fill about one fourth of your plate with whole grains.  ? Eat or drink low-fat dairy products, such as skim milk or low-fat yogurt.  ? Avoid fatty cuts of meat, processed or cured meats, and poultry with skin. Fill about one fourth of your plate with lean proteins, such as fish, chicken without skin, beans, eggs, or tofu.  ? Avoid pre-made and processed foods. These tend to be higher in sodium, added sugar, and fat.  · Reduce your daily sodium intake. Most people with hypertension should eat less than 1,500 mg of sodium a day.  · Do not drink alcohol if:  ? Your health care provider tells you not to drink.  ? You are pregnant, may be pregnant, or are planning to become pregnant.  · If you drink alcohol:  ? Limit how much you use to:  § 0-1 drink a day for women.  § 0-2 drinks a day for men.  ? Be aware of how much alcohol is in your drink. In the [place] one drink equals one 12 oz bottle of beer (355 mL), one 5 oz glass of wine (148 mL), or one 1½ oz glass of hard liquor (44 mL).    Lifestyle    · Work with your health care provider to maintain a healthy body weight or to lose weight. Ask what an ideal weight is for you.  · Get at least 30 minutes of exercise most days of the week. Activities may include walking, swimming, or biking.  · Include exercise to  strengthen your muscles (resistance exercise), such as Pilates or lifting weights, as part of your weekly exercise routine. Try to do these types of exercises for 30 minutes at least 3 days a week.  · Do not use any products that contain nicotine or tobacco, such as cigarettes, e-cigarettes, and chewing tobacco. If you need help quitting, ask your health care provider.  · Monitor your blood pressure at home as told by your health care provider.  · Keep all follow-up visits as told by your health care provider. This is important.    Medicines  · Take over-the-counter and prescription medicines only as told by your health care provider. Follow directions carefully. Blood pressure medicines must be taken as prescribed.  · Do not skip doses of blood pressure medicine. Doing this puts you at risk for problems and can make the medicine less effective.  · Ask your health care provider about side effects or reactions to medicines that you should watch for.  Contact a health care provider if you:  · Think you are having a reaction to a medicine you are taking.  · Have headaches that keep coming back (recurring).  · Feel dizzy.  · Have swelling in your ankles.  · Have trouble with your vision.  Get help right away if you:  · Develop a severe headache or confusion.  · Have unusual weakness or numbness.  · Feel faint.  · Have severe pain in your chest or abdomen.  · Vomit repeatedly.  · Have trouble breathing.  Summary  · Hypertension is when the force of blood pumping through your arteries is too strong. If this condition is not controlled, it may put you at risk for serious complications.  · Your personal target blood pressure may vary depending on your medical conditions, your age, and other factors. For most people, a normal blood pressure is less than 120/80.  · Hypertension is treated with lifestyle changes, medicines, or a combination of both. Lifestyle changes include losing weight, eating a healthy, low-sodium diet,  exercising more, and limiting alcohol.  This information is not intended to replace advice given to you by your health care provider. Make sure you discuss any questions you have with your health care provider.  Document Revised: 08/28/2019 Document Reviewed: 08/28/2019  [employer] Patient Education © 2021 [employer]  Obesity, Adult  Obesity is the condition of having too much total body fat. Being overweight or obese means that your weight is greater than what is considered healthy for your body size. Obesity is determined by a measurement called BMI. BMI is an estimate of body fat and is calculated from height and weight. For adults, a BMI of 30 or higher is considered obese.  Obesity can lead to other health concerns and major illnesses, including:  · Stroke.  · Coronary artery disease (CAD).  · Type 2 diabetes.  · Some types of cancer, including cancers of the colon, breast, uterus, and gallbladder.  · Osteoarthritis.  · High blood pressure (hypertension).  · High cholesterol.  · Sleep apnea.  · Gallbladder stones.  · Infertility problems.  What are the causes?  Common causes of this condition include:  · Eating daily meals that are high in calories, sugar, and fat.  · Being born with genes that may make you more likely to become obese.  · Having a medical condition that causes obesity, including:  ? Hypothyroidism.  ? Polycystic ovarian syndrome (PCOS).  ? Binge-eating disorder.  ? Cushing syndrome.  · Taking certain medicines, such as steroids, antidepressants, and seizure medicines.  · Not being physically active (sedentary lifestyle).  · Not getting enough sleep.  · Drinking high amounts of sugar-sweetened beverages, such as soft drinks.  What increases the risk?  The following factors may make you more likely to develop this condition:  · Having a family history of obesity.  · Being a woman of  descent.  · Being a man of  descent.  · Living in an area with limited access  to:  ? [place], recreation centers, or sidewalks.  ? Healthy food choices, such as grocery stores and farmers' markets.  What are the signs or symptoms?  The main sign of this condition is having too much body fat.  How is this diagnosed?  This condition is diagnosed based on:  · Your BMI. If you are an adult with a BMI of 30 or higher, you are considered obese.  · Your waist circumference. This measures the distance around your waistline.  · Your skinfold thickness. Your health care provider may gently pinch a fold of your skin and measure it.  You may have other tests to check for underlying conditions.  How is this treated?  Treatment for this condition often includes changing your lifestyle. Treatment may include some or all of the following:  · Dietary changes. This may include developing a healthy meal plan.  · Regular physical activity. This may include activity that causes your heart to beat faster (aerobic exercise) and strength training. Work with your health care provider to design an exercise program that works for you.  · Medicine to help you lose weight if you are unable to lose 1 pound a week after 6 weeks of healthy eating and more physical activity.  · Treating conditions that cause the obesity (underlying conditions).  · Surgery. Surgical options may include gastric banding and gastric bypass. Surgery may be done if:  ? Other treatments have not helped to improve your condition.  ? You have a BMI of 40 or higher.  ? You have life-threatening health problems related to obesity.  Follow these instructions at home:  Eating and drinking    · Follow recommendations from your health care provider about what you eat and drink. Your health care provider may advise you to:  ? Limit fast food, sweets, and processed snack foods.  ? Choose low-fat options, such as low-fat milk instead of whole milk.  ? Eat 5 or more servings of fruits or vegetables every day.  ? Eat at home more often. This gives you more  control over what you eat.  ? Choose healthy foods when you eat out.  ? Learn to read food labels. This will help you understand how much food is considered 1 serving.  ? Learn what a healthy serving size is.  ? Keep low-fat snacks available.  ? Limit sugary drinks, such as soda, fruit juice, sweetened iced tea, and flavored milk.  · Drink enough water to keep your urine pale yellow.  · Do not follow a fad diet. Fad diets can be unhealthy and even dangerous.    Physical activity  · Exercise regularly, as told by your health care provider.  ? Most adults should get up to 150 minutes of moderate-intensity exercise every week.  ? Ask your health care provider what types of exercise are safe for you and how often you should exercise.  · Warm up and stretch before being active.  · Cool down and stretch after being active.  · Rest between periods of activity.  Lifestyle  · Work with your health care provider and a dietitian to set a weight-loss goal that is healthy and reasonable for you.  · Limit your screen time.  · Find ways to reward yourself that do not involve food.  · Do not drink alcohol if:  ? Your health care provider tells you not to drink.  ? You are pregnant, may be pregnant, or are planning to become pregnant.  · If you drink alcohol:  ? Limit how much you use to:  § 0-1 drink a day for women.  § 0-2 drinks a day for men.  ? Be aware of how much alcohol is in your drink. In the [place] one drink equals one 12 oz bottle of beer (355 mL), one 5 oz glass of wine (148 mL), or one 1½ oz glass of hard liquor (44 mL).  General instructions  · Keep a weight-loss journal to keep track of the food you eat and how much exercise you get.  · Take over-the-counter and prescription medicines only as told by your health care provider.  · Take vitamins and supplements only as told by your health care provider.  · Consider joining a support group. Your health care provider may be able to recommend a support group.  · Keep all  follow-up visits as told by your health care provider. This is important.  Contact a health care provider if:  · You are unable to meet your weight loss goal after 6 weeks of dietary and lifestyle changes.  Get help right away if you are having:  · Trouble breathing.  · Suicidal thoughts or behaviors.  Summary  · Obesity is the condition of having too much total body fat.  · Being overweight or obese means that your weight is greater than what is considered healthy for your body size.  · Work with your health care provider and a dietitian to set a weight-loss goal that is healthy and reasonable for you.  · Exercise regularly, as told by your health care provider. Ask your health care provider what types of exercise are safe for you and how often you should exercise.  This information is not intended to replace advice given to you by your health care provider. Make sure you discuss any questions you have with your health care provider.  Document Revised: 08/22/2019 Document Reviewed: 08/22/2019  [employer] Patient Education © 2021 [employer]

## 2022-03-15 ENCOUNTER — LAB (OUTPATIENT)
Dept: LAB | Facility: HOSPITAL | Age: 67
End: 2022-03-15

## 2022-03-15 ENCOUNTER — TELEPHONE (OUTPATIENT)
Dept: FAMILY MEDICINE CLINIC | Facility: CLINIC | Age: 67
End: 2022-03-15

## 2022-03-15 ENCOUNTER — OFFICE VISIT (OUTPATIENT)
Dept: FAMILY MEDICINE CLINIC | Facility: CLINIC | Age: 67
End: 2022-03-15

## 2022-03-15 ENCOUNTER — HOSPITAL ENCOUNTER (OUTPATIENT)
Dept: GENERAL RADIOLOGY | Facility: HOSPITAL | Age: 67
Discharge: HOME | End: 2022-03-15

## 2022-03-15 VITALS
DIASTOLIC BLOOD PRESSURE: 71 MMHG | SYSTOLIC BLOOD PRESSURE: 137 MMHG | TEMPERATURE: 98 F | HEART RATE: 73 BPM | BODY MASS INDEX: 31.18 KG/M2 | HEIGHT: 66 IN | RESPIRATION RATE: 18 BRPM | OXYGEN SATURATION: 98 % | WEIGHT: 194 LBS

## 2022-03-15 DIAGNOSIS — U09.9 POST-COVID SYNDROME: ICD-10-CM

## 2022-03-15 DIAGNOSIS — H65.90 FLUID COLLECTION OF MIDDLE EAR: ICD-10-CM

## 2022-03-15 DIAGNOSIS — R42 DIZZINESS: ICD-10-CM

## 2022-03-15 DIAGNOSIS — R42 DIZZINESS: Primary | ICD-10-CM

## 2022-03-15 DIAGNOSIS — R06.09 DYSPNEA ON EXERTION: ICD-10-CM

## 2022-03-15 DIAGNOSIS — R09.81 HEAD CONGESTION: ICD-10-CM

## 2022-03-15 DIAGNOSIS — R05.9 COUGH: ICD-10-CM

## 2022-03-15 DIAGNOSIS — R79.89 ELEVATED D-DIMER: ICD-10-CM

## 2022-03-15 LAB
ALBUMIN SERPL-MCNC: 4.6 G/DL (ref 3.5–5)
ALBUMIN/GLOB SERPL: 1.2 G/DL (ref 1.1–2.5)
ALP SERPL-CCNC: 114 U/L (ref 24–120)
ALT SERPL W P-5'-P-CCNC: 37 U/L (ref 0–35)
ANION GAP SERPL CALCULATED.3IONS-SCNC: 6 MMOL/L (ref 4–13)
AST SERPL-CCNC: 38 U/L (ref 7–45)
AUTO MIXED CELLS #: 1.4 10*3/MM3 (ref 0.1–2.6)
AUTO MIXED CELLS %: 12.2 % (ref 0.1–24)
BILIRUB SERPL-MCNC: 0.6 MG/DL (ref 0.1–1)
BILIRUB UR QL STRIP: NEGATIVE
BUN SERPL-MCNC: 16 MG/DL (ref 5–21)
BUN/CREAT SERPL: 18
CALCIUM SPEC-SCNC: 9.8 MG/DL (ref 8.4–10.4)
CHLORIDE SERPL-SCNC: 102 MMOL/L (ref 98–110)
CLARITY UR: CLEAR
CO2 SERPL-SCNC: 28 MMOL/L (ref 24–31)
COLOR UR: YELLOW
CREAT SERPL-MCNC: 0.89 MG/DL (ref 0.5–1.4)
D DIMER PPP FEU-MCNC: 0.61 MG/L (FEU) (ref 0–0.5)
EGFRCR SERPLBLD CKD-EPI 2021: 72.5 ML/MIN/1.73
ERYTHROCYTE [DISTWIDTH] IN BLOOD BY AUTOMATED COUNT: 13.4 % (ref 12.3–15.4)
ERYTHROCYTE [SEDIMENTATION RATE] IN BLOOD: 17 MM/HR (ref 0–30)
GLOBULIN UR ELPH-MCNC: 3.8 GM/DL
GLUCOSE SERPL-MCNC: 112 MG/DL (ref 70–100)
GLUCOSE UR STRIP-MCNC: NEGATIVE MG/DL
HCT VFR BLD AUTO: 44.4 % (ref 34–46.6)
HGB BLD-MCNC: 14.5 G/DL (ref 12–15.9)
HGB UR QL STRIP.AUTO: NEGATIVE
KETONES UR QL STRIP: NEGATIVE
LEUKOCYTE ESTERASE UR QL STRIP.AUTO: NEGATIVE
LYMPHOCYTES # BLD AUTO: 2.9 10*3/MM3 (ref 0.7–3.1)
LYMPHOCYTES NFR BLD AUTO: 25.7 % (ref 19.6–45.3)
MCH RBC QN AUTO: 28.9 PG (ref 26.6–33)
MCHC RBC AUTO-ENTMCNC: 32.7 G/DL (ref 31.5–35.7)
MCV RBC AUTO: 88.4 FL (ref 79–97)
NEUTROPHILS NFR BLD AUTO: 62.1 % (ref 42.7–76)
NEUTROPHILS NFR BLD AUTO: 7 10*3/MM3 (ref 1.7–7)
NITRITE UR QL STRIP: NEGATIVE
PH UR STRIP.AUTO: 6.5 [PH] (ref 5–8)
PLATELET # BLD AUTO: 301 10*3/MM3 (ref 140–450)
PMV BLD AUTO: 10.3 FL (ref 6–12)
POTASSIUM SERPL-SCNC: 4.2 MMOL/L (ref 3.5–5.3)
PROT SERPL-MCNC: 8.4 G/DL (ref 6.3–8.7)
PROT UR QL STRIP: NEGATIVE
RBC # BLD AUTO: 5.02 10*6/MM3 (ref 3.77–5.28)
SODIUM SERPL-SCNC: 136 MMOL/L (ref 135–145)
SP GR UR STRIP: 1.01 (ref 1–1.03)
UROBILINOGEN UR QL STRIP: NORMAL
WBC NRBC COR # BLD: 11.3 10*3/MM3 (ref 3.4–10.8)

## 2022-03-15 PROCEDURE — 84443 ASSAY THYROID STIM HORMONE: CPT

## 2022-03-15 PROCEDURE — 81003 URINALYSIS AUTO W/O SCOPE: CPT

## 2022-03-15 PROCEDURE — 71046 X-RAY EXAM CHEST 2 VIEWS: CPT

## 2022-03-15 PROCEDURE — 85025 COMPLETE CBC W/AUTO DIFF WBC: CPT

## 2022-03-15 PROCEDURE — 85652 RBC SED RATE AUTOMATED: CPT

## 2022-03-15 PROCEDURE — 80053 COMPREHEN METABOLIC PANEL: CPT

## 2022-03-15 PROCEDURE — 96372 THER/PROPH/DIAG INJ SC/IM: CPT

## 2022-03-15 PROCEDURE — 85379 FIBRIN DEGRADATION QUANT: CPT

## 2022-03-15 PROCEDURE — 99214 OFFICE O/P EST MOD 30 MIN: CPT

## 2022-03-15 PROCEDURE — 36415 COLL VENOUS BLD VENIPUNCTURE: CPT

## 2022-03-15 RX ORDER — DEXAMETHASONE SODIUM PHOSPHATE 4 MG/ML
8 INJECTION, SOLUTION INTRA-ARTICULAR; INTRALESIONAL; INTRAMUSCULAR; INTRAVENOUS; SOFT TISSUE ONCE
Status: COMPLETED | OUTPATIENT
Start: 2022-03-15 | End: 2022-03-15

## 2022-03-15 RX ORDER — MECLIZINE HYDROCHLORIDE 25 MG/1
25 TABLET ORAL 3 TIMES DAILY PRN
Qty: 90 TABLET | Refills: 0 | Status: SHIPPED | OUTPATIENT
Start: 2022-03-15 | End: 2022-09-19

## 2022-03-15 RX ADMIN — DEXAMETHASONE SODIUM PHOSPHATE 8 MG: 4 INJECTION, SOLUTION INTRA-ARTICULAR; INTRALESIONAL; INTRAMUSCULAR; INTRAVENOUS; SOFT TISSUE at 12:01

## 2022-03-15 NOTE — TELEPHONE ENCOUNTER
Autumn Ruiz, JAIME  w [facility] 5 minutes ago (1:02 PM)     For some reason It would not let me address the CXR. Please let the patient know her cxr is normal, it did show older granuloma tissue that is resolved.     Contacted pt, verified name and . Informed of above per provider. Pt vu of all.

## 2022-03-15 NOTE — PROGRESS NOTES
"Chief Complaint  Earache, Dizziness, Allergies, and Shortness of Breath    Subjective    History of Present Illness      Patient presents to [facility] for   SOB, dizziness, weakness, and bilateral earache. Symptoms have been present for about a week. Patient states that she suffers from environmental allergies but due to SOB.        Review of Systems   HENT: Positive for congestion and ear pain.    Respiratory: Positive for shortness of breath.    Allergic/Immunologic: Positive for environmental allergies.   Neurological: Positive for dizziness and weakness.   All other systems reviewed and are negative.      I have reviewed and agree with the HPI and ROS information as above.  Autumn Ruiz, JAIME     Objective   Vital Signs:   /71   Pulse 73   Temp 98 °F (36.7 °C)   Resp 18   Ht 167.6 cm (65.98\")   Wt 88 kg (194 lb)   SpO2 98%   BMI 31.33 kg/m²       Physical Exam  Constitutional:       Appearance: Normal appearance. She is well-developed.   HENT:      Head: Normocephalic and atraumatic.      Right Ear: Ear canal and external ear normal.      Left Ear: Tympanic membrane, ear canal and external ear normal.      Ears:      Comments: Dull TM, middle ear fluid noted bilaterally      Nose: Congestion present. No septal deviation or nasal tenderness.      Mouth/Throat:      Lips: Pink. No lesions.      Mouth: Mucous membranes are moist. No oral lesions.      Dentition: Normal dentition.      Pharynx: Oropharynx is clear. No pharyngeal swelling, oropharyngeal exudate or posterior oropharyngeal erythema.      Comments: Post nasal drip present   Eyes:      General: Lids are normal. Vision grossly intact. No scleral icterus.        Right eye: No discharge.         Left eye: No discharge.      Extraocular Movements: Extraocular movements intact.      Conjunctiva/sclera: Conjunctivae normal.      Right eye: Right conjunctiva is not injected.      Left eye: Left conjunctiva is not " injected.      Pupils: Pupils are equal, round, and reactive to light.   Neck:      Thyroid: No thyroid mass.      Trachea: Trachea normal.   Cardiovascular:      Rate and Rhythm: Normal rate and regular rhythm.      Heart sounds: Normal heart sounds. No murmur heard.    No gallop.   Pulmonary:      Effort: Pulmonary effort is normal.      Breath sounds: Normal air entry. Examination of the right-lower field reveals wheezing. Examination of the left-lower field reveals wheezing. Wheezing present. No rhonchi or rales.   Abdominal:      General: There is no distension.      Palpations: Abdomen is soft. There is no mass.      Tenderness: There is no abdominal tenderness. There is no right CVA tenderness, left CVA tenderness, guarding or rebound.   Musculoskeletal:         General: No tenderness or deformity. Normal range of motion.      Cervical back: Full passive range of motion without pain, normal range of motion and neck supple.      Thoracic back: Normal.      Right lower leg: No edema.      Left lower leg: No edema.   Skin:     General: Skin is warm and dry.      Coloration: Skin is not jaundiced.      Findings: No rash.   Neurological:      Mental Status: She is alert and oriented to person, place, and time.      Cranial Nerves: Cranial nerves are intact.      Sensory: Sensation is intact.      Motor: Motor function is intact.      Coordination: Coordination is intact.      Gait: Gait is intact.      Deep Tendon Reflexes: Reflexes are normal and symmetric.      Comments: Dizziness with sitting and sudden movements on exam    Psychiatric:         Mood and Affect: Mood is anxious. Affect is tearful.         Judgment: Judgment normal.          Result Review  Data Reviewed:                   Assessment and Plan      Problem List Items Addressed This Visit    None     Visit Diagnoses     Dizziness    -  Primary    Relevant Medications    meclizine (ANTIVERT) 25 MG tablet    dexamethasone (DECADRON) injection 8 mg  (Completed) (Start on 3/15/2022  1:00 PM)    Other Relevant Orders    CBC & Differential    Comprehensive metabolic panel    Urinalysis With Culture If Indicated -    D-dimer, Quantitative    Sedimentation rate, automated    TSH    Adult Transthoracic Echo Complete W/ Cont if Necessary Per Protocol    Fluid collection of middle ear        Relevant Medications    meclizine (ANTIVERT) 25 MG tablet    Cough        Relevant Medications    dexamethasone (DECADRON) injection 8 mg (Completed) (Start on 3/15/2022  1:00 PM)    Head congestion        Post-COVID syndrome        Relevant Medications    meclizine (ANTIVERT) 25 MG tablet    dexamethasone (DECADRON) injection 8 mg (Completed) (Start on 3/15/2022  1:00 PM)    Other Relevant Orders    XR Chest PA & Lateral    CBC & Differential    Comprehensive metabolic panel    Urinalysis With Culture If Indicated -    D-dimer, Quantitative    Sedimentation rate, automated    TSH    Adult Transthoracic Echo Complete W/ Cont if Necessary Per Protocol    Dyspnea on exertion        Relevant Medications    meclizine (ANTIVERT) 25 MG tablet    dexamethasone (DECADRON) injection 8 mg (Completed) (Start on 3/15/2022  1:00 PM)    Other Relevant Orders    XR Chest PA & Lateral    CBC & Differential    Comprehensive metabolic panel    Urinalysis With Culture If Indicated -    D-dimer, Quantitative    Sedimentation rate, automated    TSH    Adult Transthoracic Echo Complete W/ Cont if Necessary Per Protocol      Patient is here today with plaints of dizziness, cough, head congestion, and shortness of breath on exertion.  She states that she has had Covid twice with her last round being in January 2022.  Patient says that she does have bronchitis frequently.  Patient states that she feels like she has not gotten completely well since her last round of Covid and feels like she has gotten significantly worse in the past 3 weeks.  Patient today in office appears to not feel well.  During exam  patient became very dizzy which I instructed her to put her feet on the floor and look straight ahead take deep breaths.  Patient states that her shortness of breath has gotten increasingly worse in the last couple of weeks.  She states that she gets out of her car and by the time she gets to the entrance of a store she has to stop due to being out of breath.  Patient does have wheezing in the bases of lungs today on exam.  Patient suffers from chronic sinusitis.  Middle ear fluid is noted bilaterally today.  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    Plan  1. CBC, CMP, UA, Sed Rate, D-Dimer, TSH ordered today  2. Echo due to dyspnea on exertion and post covid  3. CXR today in office   4. Meclizine sent for dizziness   5. Decadron 8 in office today; patient requested shot over the steriod shelby.     Patient educated on s/s of worsening symptoms and when to seek ER.       Follow Up   Return if symptoms worsen or fail to improve.  Patient was given instructions and counseling regarding her condition or for health maintenance advice. Please see specific information pulled into the AVS if appropriate.

## 2022-03-16 ENCOUNTER — TELEPHONE (OUTPATIENT)
Dept: FAMILY MEDICINE CLINIC | Facility: CLINIC | Age: 67
End: 2022-03-16

## 2022-03-16 LAB — TSH SERPL DL<=0.05 MIU/L-ACNC: 2.63 UIU/ML (ref 0.27–4.2)

## 2022-03-16 NOTE — TELEPHONE ENCOUNTER
----- Message from JAIME Rivera sent at 3/15/2022  4:20 PM CDT -----  Please let patient know her D-Dimer is Elevated. I will order CTA

## 2022-03-17 ENCOUNTER — APPOINTMENT (OUTPATIENT)
Dept: GENERAL RADIOLOGY | Facility: HOSPITAL | Age: 67
End: 2022-03-17

## 2022-03-17 ENCOUNTER — HOSPITAL ENCOUNTER (EMERGENCY)
Facility: HOSPITAL | Age: 67
Discharge: HOME | End: 2022-03-17
Admitting: EMERGENCY MEDICINE

## 2022-03-17 ENCOUNTER — APPOINTMENT (OUTPATIENT)
Dept: CT IMAGING | Facility: HOSPITAL | Age: 67
End: 2022-03-17

## 2022-03-17 VITALS
DIASTOLIC BLOOD PRESSURE: 84 MMHG | WEIGHT: 190 LBS | RESPIRATION RATE: 18 BRPM | HEIGHT: 66 IN | HEART RATE: 77 BPM | SYSTOLIC BLOOD PRESSURE: 148 MMHG | TEMPERATURE: 98.3 F | OXYGEN SATURATION: 97 % | BODY MASS INDEX: 30.53 KG/M2

## 2022-03-17 DIAGNOSIS — J42 CHRONIC BRONCHITIS, UNSPECIFIED CHRONIC BRONCHITIS TYPE: ICD-10-CM

## 2022-03-17 DIAGNOSIS — J18.9 PNEUMONITIS: ICD-10-CM

## 2022-03-17 DIAGNOSIS — R07.9 CHEST PAIN, UNSPECIFIED TYPE: Primary | ICD-10-CM

## 2022-03-17 LAB
ALBUMIN SERPL-MCNC: 4.5 G/DL (ref 3.5–5.2)
ALBUMIN/GLOB SERPL: 1.4 G/DL
ALP SERPL-CCNC: 110 U/L (ref 39–117)
ALT SERPL W P-5'-P-CCNC: 35 U/L (ref 1–33)
ANION GAP SERPL CALCULATED.3IONS-SCNC: 13 MMOL/L (ref 5–15)
AST SERPL-CCNC: 37 U/L (ref 1–32)
BASOPHILS # BLD AUTO: 0.07 10*3/MM3 (ref 0–0.2)
BASOPHILS NFR BLD AUTO: 0.6 % (ref 0–1.5)
BILIRUB SERPL-MCNC: 0.2 MG/DL (ref 0–1.2)
BUN SERPL-MCNC: 21 MG/DL (ref 8–23)
BUN/CREAT SERPL: 25.3 (ref 7–25)
CALCIUM SPEC-SCNC: 9.5 MG/DL (ref 8.6–10.5)
CHLORIDE SERPL-SCNC: 102 MMOL/L (ref 98–107)
CO2 SERPL-SCNC: 23 MMOL/L (ref 22–29)
CREAT SERPL-MCNC: 0.83 MG/DL (ref 0.57–1)
DEPRECATED RDW RBC AUTO: 46 FL (ref 37–54)
EGFRCR SERPLBLD CKD-EPI 2021: 78.8 ML/MIN/1.73
EOSINOPHIL # BLD AUTO: 0.29 10*3/MM3 (ref 0–0.4)
EOSINOPHIL NFR BLD AUTO: 2.4 % (ref 0.3–6.2)
ERYTHROCYTE [DISTWIDTH] IN BLOOD BY AUTOMATED COUNT: 13.9 % (ref 12.3–15.4)
GLOBULIN UR ELPH-MCNC: 3.2 GM/DL
GLUCOSE SERPL-MCNC: 93 MG/DL (ref 65–99)
HCT VFR BLD AUTO: 45.9 % (ref 34–46.6)
HGB BLD-MCNC: 14.7 G/DL (ref 12–15.9)
IMM GRANULOCYTES # BLD AUTO: 0.07 10*3/MM3 (ref 0–0.05)
IMM GRANULOCYTES NFR BLD AUTO: 0.6 % (ref 0–0.5)
LYMPHOCYTES # BLD AUTO: 3.09 10*3/MM3 (ref 0.7–3.1)
LYMPHOCYTES NFR BLD AUTO: 25.5 % (ref 19.6–45.3)
MCH RBC QN AUTO: 29 PG (ref 26.6–33)
MCHC RBC AUTO-ENTMCNC: 32 G/DL (ref 31.5–35.7)
MCV RBC AUTO: 90.5 FL (ref 79–97)
MONOCYTES # BLD AUTO: 1 10*3/MM3 (ref 0.1–0.9)
MONOCYTES NFR BLD AUTO: 8.3 % (ref 5–12)
NEUTROPHILS NFR BLD AUTO: 62.6 % (ref 42.7–76)
NEUTROPHILS NFR BLD AUTO: 7.59 10*3/MM3 (ref 1.7–7)
NRBC BLD AUTO-RTO: 0 /100 WBC (ref 0–0.2)
NT-PROBNP SERPL-MCNC: 51.9 PG/ML (ref 0–900)
PLATELET # BLD AUTO: 330 10*3/MM3 (ref 140–450)
PMV BLD AUTO: 10.9 FL (ref 6–12)
POTASSIUM SERPL-SCNC: 3.5 MMOL/L (ref 3.5–5.2)
PROT SERPL-MCNC: 7.7 G/DL (ref 6–8.5)
RBC # BLD AUTO: 5.07 10*6/MM3 (ref 3.77–5.28)
SODIUM SERPL-SCNC: 138 MMOL/L (ref 136–145)
TROPONIN T SERPL-MCNC: <0.01 NG/ML (ref 0–0.03)
TROPONIN T SERPL-MCNC: <0.01 NG/ML (ref 0–0.03)
WBC NRBC COR # BLD: 12.11 10*3/MM3 (ref 3.4–10.8)

## 2022-03-17 PROCEDURE — 83880 ASSAY OF NATRIURETIC PEPTIDE: CPT | Performed by: NURSE PRACTITIONER

## 2022-03-17 PROCEDURE — 93010 ELECTROCARDIOGRAM REPORT: CPT | Performed by: INTERNAL MEDICINE

## 2022-03-17 PROCEDURE — 80053 COMPREHEN METABOLIC PANEL: CPT | Performed by: NURSE PRACTITIONER

## 2022-03-17 PROCEDURE — 0 IOPAMIDOL PER 1 ML: Performed by: NURSE PRACTITIONER

## 2022-03-17 PROCEDURE — 84484 ASSAY OF TROPONIN QUANT: CPT | Performed by: NURSE PRACTITIONER

## 2022-03-17 PROCEDURE — 93005 ELECTROCARDIOGRAM TRACING: CPT | Performed by: EMERGENCY MEDICINE

## 2022-03-17 PROCEDURE — 85025 COMPLETE CBC W/AUTO DIFF WBC: CPT | Performed by: NURSE PRACTITIONER

## 2022-03-17 PROCEDURE — 93005 ELECTROCARDIOGRAM TRACING: CPT | Performed by: NURSE PRACTITIONER

## 2022-03-17 PROCEDURE — 99284 EMERGENCY DEPT VISIT MOD MDM: CPT

## 2022-03-17 PROCEDURE — 71275 CT ANGIOGRAPHY CHEST: CPT

## 2022-03-17 RX ORDER — ALBUTEROL SULFATE 90 UG/1
2 AEROSOL, METERED RESPIRATORY (INHALATION) EVERY 4 HOURS PRN
Qty: 6.7 G | Refills: 0 | Status: SHIPPED | OUTPATIENT
Start: 2022-03-17 | End: 2022-03-24

## 2022-03-17 RX ORDER — METHYLPREDNISOLONE 4 MG/1
TABLET ORAL
Qty: 1 EACH | Refills: 0 | Status: SHIPPED | OUTPATIENT
Start: 2022-03-17 | End: 2022-09-19

## 2022-03-17 RX ORDER — ACETAMINOPHEN 500 MG
1000 TABLET ORAL ONCE
Status: COMPLETED | OUTPATIENT
Start: 2022-03-17 | End: 2022-03-17

## 2022-03-17 RX ORDER — IPRATROPIUM BROMIDE AND ALBUTEROL SULFATE 2.5; .5 MG/3ML; MG/3ML
3 SOLUTION RESPIRATORY (INHALATION) EVERY 4 HOURS PRN
Qty: 360 ML | Refills: 0 | Status: SHIPPED | OUTPATIENT
Start: 2022-03-17 | End: 2022-12-02

## 2022-03-17 RX ORDER — SODIUM CHLORIDE 0.9 % (FLUSH) 0.9 %
10 SYRINGE (ML) INJECTION AS NEEDED
Status: DISCONTINUED | OUTPATIENT
Start: 2022-03-17 | End: 2022-03-17 | Stop reason: HOSPADM

## 2022-03-17 RX ADMIN — ACETAMINOPHEN 1000 MG: 500 TABLET, FILM COATED ORAL at 14:50

## 2022-03-17 RX ADMIN — IOPAMIDOL 100 ML: 755 INJECTION, SOLUTION INTRAVENOUS at 16:21

## 2022-03-17 NOTE — ED PROVIDER NOTES
Subjective   Patient is a very pleasant 66-year-old female presents here today with complaint of chest pain.  The patient reports that this began prior to arrival and occurred for approximately 30 minutes.  She states that she was at work and she was supposed to be having a CTA of the chest done today.  She states that she had Covid in January 2022 and since then has felt short of breath.  She had labs drawn on March 15, 2022 and had an elevated D-dimer of 0.61.  Her primary care provider ordered an outpatient CTA today.  The patient states that she is talking about this but did not want to get it done and states several coworkers were telling her that she could die if she had a blood clot and she became very anxious and started having midsternal nonradiating chest pain.  Again she states that this lasted for approximately 30 minutes and resolved.  She denies any previous history of cardiac disease.  The patient states that she had blood work and a chest x-ray done on March 15, 2022 and was initially wary to have any further testing done today as she does not have insurance.  The patient states that she has no chest pain or shortness of air currently.  She presents here today for further evaluation.    3/15/2022 CXR:   IMPRESSION:  1. No acute appearing infiltrate or effusion.  2. Mild bronchial wall thickening, stable. Old granulomatous disease.       History provided by:  Patient   used: No    Chest Pain  Pain location:  Substernal area  Pain quality: sharp    Pain radiates to:  Does not radiate  Pain severity:  Moderate  Onset quality:  Sudden  Duration:  30 minutes  Timing:  Intermittent  Progression:  Resolved  Chronicity:  New  Context: not breathing, not drug use, not eating, not intercourse, not lifting, not movement, not raising an arm, not at rest, not stress and not trauma    Relieved by:  Nothing  Worsened by:  Nothing  Ineffective treatments:  None tried  Associated symptoms: no  abdominal pain, no AICD problem, no altered mental status, no anorexia, no anxiety, no back pain, no claudication, no cough, no diaphoresis, no dizziness, no dysphagia, no fatigue, no fever, no headache, no heartburn, no lower extremity edema, no nausea, no near-syncope, no numbness, no orthopnea, no palpitations, no PND, no shortness of breath, no syncope, no vomiting and no weakness    Risk factors: hypertension    Risk factors: no aortic disease, no birth control, no coronary artery disease, no diabetes mellitus, no Abiodun-Danlos syndrome, no high cholesterol, no immobilization, not male, no Marfan's syndrome, not obese, not pregnant, no prior DVT/PE, no smoking and no surgery        Review of Systems   Constitutional: Negative for diaphoresis, fatigue and fever.   HENT: Negative for trouble swallowing.    Respiratory: Negative for cough and shortness of breath.    Cardiovascular: Positive for chest pain. Negative for palpitations, orthopnea, claudication, syncope, PND and near-syncope.   Gastrointestinal: Negative for abdominal pain, anorexia, heartburn, nausea and vomiting.   Musculoskeletal: Negative for back pain.   Neurological: Negative for dizziness, weakness, numbness and headaches.   All other systems reviewed and are negative.      Past Medical History:   Diagnosis Date   • Allergies    • Anxiety    • Depression    • Hypertension        No Known Allergies    Past Surgical History:   Procedure Laterality Date   •  SECTION     •  SECTION         Family History   Family history unknown: Yes       Social History     Socioeconomic History   • Marital status:    Tobacco Use   • Smoking status: Never Smoker   • Smokeless tobacco: Never Used   Vaping Use   • Vaping Use: Never used   Substance and Sexual Activity   • Alcohol use: Never   • Drug use: Never   • Sexual activity: Defer           Objective   Physical Exam  Vitals and nursing note reviewed.   Constitutional:        Appearance: Normal appearance.   HENT:      Head: Normocephalic and atraumatic.      Mouth/Throat:      Pharynx: Oropharynx is clear.   Eyes:      Conjunctiva/sclera: Conjunctivae normal.   Cardiovascular:      Rate and Rhythm: Normal rate and regular rhythm.   Pulmonary:      Effort: Pulmonary effort is normal.      Breath sounds: Normal breath sounds.   Abdominal:      General: Bowel sounds are normal.      Palpations: Abdomen is soft.   Skin:     General: Skin is warm and dry.      Capillary Refill: Capillary refill takes less than 2 seconds.   Neurological:      General: No focal deficit present.      Mental Status: She is alert.   Psychiatric:         Mood and Affect: Mood normal.         Procedures           ED Course  ED Course as of 03/18/22 1336   Thu Mar 17, 2022   1442 I discussed the chest pain work-up with the patient she is agreeable to have this done.  We will go ahead and order the CTA chest for her as well today. [LF]   1737 The patient's troponins x2 are negative.  EKG shows right bundle branch block.  There are no previous EKGs to compare to.  The patient was offered admission but declines prefers to go home.  CTA chest shows no evidence of PE.  There is air trapping which can be seen with edema or pneumonitis.  The patient's BNP is normal.  At this time she will be discharged home in stable condition.  She also has some esophageal wall thickening advised her to follow with her PCP for this.  She is vies return ER for any new or worsening symptoms or be discharged home at this time stable condition. [LF]   Fri Mar 18, 2022   1334 The patient prescription for Medrol Dosepak.  She states that she sometimes has cough and wheezing with this we will try that as well as a prescription for nebulizer machine, DuoNebs, and ProAir.  The patient will be discharged home at this time stable condition.  She was offered admission however she declines prefers to go home.  She was discharged home in stable  condition. [LF]      ED Course User Index  [LF] Brianna Sims M, APRN                                         CT Angiogram Chest   Final Result   1. No pulmonary emboli.   2. Cardiomegaly with a mosaic appearance to the lung parenchyma often   seen with air trapping. Edema or pneumonitis considered. No lobar   consolidation. Cranial images calcifications.   3. Small volume of air within the esophagus with questionable diffuse   esophageal wall thickening, artifactual versus esophagitis.   This report was finalized on 03/17/2022 16:33 by Dr. Krystin Ch MD.        Labs Reviewed   COMPREHENSIVE METABOLIC PANEL - Abnormal; Notable for the following components:       Result Value    ALT (SGPT) 35 (*)     AST (SGOT) 37 (*)     BUN/Creatinine Ratio 25.3 (*)     All other components within normal limits    Narrative:     GFR Normal >60  Chronic Kidney Disease <60  Kidney Failure <15     CBC WITH AUTO DIFFERENTIAL - Abnormal; Notable for the following components:    WBC 12.11 (*)     Immature Grans % 0.6 (*)     Neutrophils, Absolute 7.59 (*)     Monocytes, Absolute 1.00 (*)     Immature Grans, Absolute 0.07 (*)     All other components within normal limits   TROPONIN (IN-HOUSE) - Normal    Narrative:     Troponin T Reference Range:  <= 0.03 ng/mL-   Negative for AMI  >0.03 ng/mL-     Abnormal for myocardial necrosis.  Clinicians would have to utilize clinical acumen, EKG, Troponin and serial changes to determine if it is an Acute Myocardial Infarction or myocardial injury due to an underlying chronic condition.       Results may be falsely decreased if patient taking Biotin.     BNP (IN-HOUSE) - Normal    Narrative:     Among patients with dyspnea, NT-proBNP is highly sensitive for the detection of acute congestive heart failure. In addition NT-proBNP of <300 pg/ml effectively rules out acute congestive heart failure with 99% negative predictive value.    Results may be falsely decreased if patient taking Biotin.      TROPONIN (IN-HOUSE) - Normal    Narrative:     Troponin T Reference Range:  <= 0.03 ng/mL-   Negative for AMI  >0.03 ng/mL-     Abnormal for myocardial necrosis.  Clinicians would have to utilize clinical acumen, EKG, Troponin and serial changes to determine if it is an Acute Myocardial Infarction or myocardial injury due to an underlying chronic condition.       Results may be falsely decreased if patient taking Biotin.     CBC AND DIFFERENTIAL    Narrative:     The following orders were created for panel order CBC & Differential.  Procedure                               Abnormality         Status                     ---------                               -----------         ------                     CBC Auto Differential[[id number]]        Abnormal            Final result                 Please view results for these tests on the individual orders.           HEART Score (for prediction of 6-week risk of major adverse cardiac event) reviewed and/or performed as part of the patient evaluation and treatment planning process.  The result associated with this review/performance is: 3       MDM  Number of Diagnoses or Management Options  Chest pain, unspecified type: new and requires workup  Chronic bronchitis, unspecified chronic bronchitis type (HCC): new and requires workup  Pneumonitis: new and requires workup     Amount and/or Complexity of Data Reviewed  Clinical lab tests: ordered and reviewed  Tests in the radiology section of CPT®: ordered and reviewed  Tests in the medicine section of CPT®: ordered and reviewed  Discuss the patient with other providers: yes    Patient Progress  Patient progress: stable      Final diagnoses:   Chest pain, unspecified type   Chronic bronchitis, unspecified chronic bronchitis type (HCC)   Pneumonitis       ED Disposition  ED Disposition     ED Disposition   Discharge    Condition   Stable    Comment   --             Aries Malhotra MD  [id number]  [id number] [phone]    Call in 1 day           Medication List      New Prescriptions    albuterol sulfate  (90 Base) MCG/ACT inhaler  Commonly known as: PROVENTIL HFA;VENTOLIN HFA;PROAIR HFA  Inhale 2 puffs Every 4 (Four) Hours As Needed for Wheezing for up to 7 days.     ipratropium-albuterol 0.5-2.5 mg/3 ml nebulizer  Commonly known as: DUO-NEB  Take 3 mL by nebulization Every 4 (Four) Hours As Needed for Wheezing for up to 30 days.     methylPREDNISolone 4 MG dose pack  Commonly known as: MEDROL  Take as directed on package instructions.           Where to Get Your Medications      These medications were sent to [facility] [address] [place] [address] [address] [facility] [phone]  - [phone] [place] [id number]    Phone: [phone]   · albuterol sulfate  (90 Base) MCG/ACT inhaler  · ipratropium-albuterol 0.5-2.5 mg/3 ml nebulizer  · methylPREDNISolone 4 MG dose pack          Brianna Sims, APRN  03/18/22 7796

## 2022-03-18 ENCOUNTER — APPOINTMENT (OUTPATIENT)
Dept: CT IMAGING | Facility: HOSPITAL | Age: 67
End: 2022-03-18

## 2022-03-18 DIAGNOSIS — I51.7 CARDIOMEGALY: ICD-10-CM

## 2022-03-18 DIAGNOSIS — R93.89 ABNORMAL COMPUTED TOMOGRAPHY ANGIOGRAPHY (CTA): ICD-10-CM

## 2022-03-18 DIAGNOSIS — R06.09 DYSPNEA ON EXERTION: Primary | ICD-10-CM

## 2022-03-18 LAB
QT INTERVAL: 396 MS
QT INTERVAL: 404 MS
QTC INTERVAL: 469 MS
QTC INTERVAL: 473 MS

## 2022-07-14 ENCOUNTER — TELEPHONE (OUTPATIENT)
Dept: FAMILY MEDICINE CLINIC | Facility: CLINIC | Age: 67
End: 2022-07-14

## 2022-09-13 ENCOUNTER — TELEPHONE (OUTPATIENT)
Dept: FAMILY MEDICINE CLINIC | Facility: CLINIC | Age: 67
End: 2022-09-13

## 2022-09-19 ENCOUNTER — HOSPITAL ENCOUNTER (OUTPATIENT)
Dept: GENERAL RADIOLOGY | Facility: HOSPITAL | Age: 67
Discharge: HOME | End: 2022-09-19

## 2022-09-19 ENCOUNTER — LAB (OUTPATIENT)
Dept: LAB | Facility: HOSPITAL | Age: 67
End: 2022-09-19

## 2022-09-19 ENCOUNTER — OFFICE VISIT (OUTPATIENT)
Dept: FAMILY MEDICINE CLINIC | Facility: CLINIC | Age: 67
End: 2022-09-19

## 2022-09-19 VITALS
SYSTOLIC BLOOD PRESSURE: 155 MMHG | DIASTOLIC BLOOD PRESSURE: 84 MMHG | HEART RATE: 81 BPM | BODY MASS INDEX: 31.5 KG/M2 | HEIGHT: 66 IN | WEIGHT: 196 LBS

## 2022-09-19 DIAGNOSIS — M10.9 ACUTE GOUT INVOLVING TOE OF LEFT FOOT, UNSPECIFIED CAUSE: ICD-10-CM

## 2022-09-19 DIAGNOSIS — E78.00 HYPERCHOLESTEREMIA: ICD-10-CM

## 2022-09-19 DIAGNOSIS — M79.672 LEFT FOOT PAIN: ICD-10-CM

## 2022-09-19 DIAGNOSIS — M79.672 LEFT FOOT PAIN: Primary | ICD-10-CM

## 2022-09-19 DIAGNOSIS — F41.8 MIXED ANXIETY AND DEPRESSIVE DISORDER: ICD-10-CM

## 2022-09-19 DIAGNOSIS — I10 ESSENTIAL HYPERTENSION: ICD-10-CM

## 2022-09-19 DIAGNOSIS — E78.00 ELEVATED LDL CHOLESTEROL LEVEL: ICD-10-CM

## 2022-09-19 LAB
ALBUMIN SERPL-MCNC: 4.7 G/DL (ref 3.5–5.2)
ALBUMIN/GLOB SERPL: 1.7 G/DL
ALP SERPL-CCNC: 118 U/L (ref 39–117)
ALT SERPL W P-5'-P-CCNC: 29 U/L (ref 1–33)
ANION GAP SERPL CALCULATED.3IONS-SCNC: 11 MMOL/L (ref 5–15)
AST SERPL-CCNC: 29 U/L (ref 1–32)
AUTO MIXED CELLS #: 1.5 10*3/MM3 (ref 0.1–2.6)
AUTO MIXED CELLS %: 13 % (ref 0.1–24)
BILIRUB SERPL-MCNC: 0.3 MG/DL (ref 0–1.2)
BUN SERPL-MCNC: 10 MG/DL (ref 8–23)
BUN/CREAT SERPL: 11 (ref 7–25)
CALCIUM SPEC-SCNC: 9.7 MG/DL (ref 8.6–10.5)
CHLORIDE SERPL-SCNC: 103 MMOL/L (ref 98–107)
CO2 SERPL-SCNC: 27 MMOL/L (ref 22–29)
CREAT SERPL-MCNC: 0.91 MG/DL (ref 0.57–1)
EGFRCR SERPLBLD CKD-EPI 2021: 70.2 ML/MIN/1.73
ERYTHROCYTE [DISTWIDTH] IN BLOOD BY AUTOMATED COUNT: 13.4 % (ref 12.3–15.4)
ERYTHROCYTE [SEDIMENTATION RATE] IN BLOOD: 13 MM/HR (ref 0–30)
GLOBULIN UR ELPH-MCNC: 2.7 GM/DL
GLUCOSE SERPL-MCNC: 96 MG/DL (ref 65–99)
HCT VFR BLD AUTO: 41.8 % (ref 34–46.6)
HGB BLD-MCNC: 14 G/DL (ref 12–15.9)
LYMPHOCYTES # BLD AUTO: 2.9 10*3/MM3 (ref 0.7–3.1)
LYMPHOCYTES NFR BLD AUTO: 25 % (ref 19.6–45.3)
MCH RBC QN AUTO: 29.4 PG (ref 26.6–33)
MCHC RBC AUTO-ENTMCNC: 33.5 G/DL (ref 31.5–35.7)
MCV RBC AUTO: 87.8 FL (ref 79–97)
NEUTROPHILS NFR BLD AUTO: 62 % (ref 42.7–76)
NEUTROPHILS NFR BLD AUTO: 7.1 10*3/MM3 (ref 1.7–7)
PLATELET # BLD AUTO: 319 10*3/MM3 (ref 140–450)
PMV BLD AUTO: 10.2 FL (ref 6–12)
POTASSIUM SERPL-SCNC: 3.4 MMOL/L (ref 3.5–5.2)
PROT SERPL-MCNC: 7.4 G/DL (ref 6–8.5)
RBC # BLD AUTO: 4.76 10*6/MM3 (ref 3.77–5.28)
SODIUM SERPL-SCNC: 141 MMOL/L (ref 136–145)
URATE SERPL-MCNC: 7.3 MG/DL (ref 2.4–5.7)
WBC NRBC COR # BLD: 11.5 10*3/MM3 (ref 3.4–10.8)

## 2022-09-19 PROCEDURE — 80053 COMPREHEN METABOLIC PANEL: CPT

## 2022-09-19 PROCEDURE — 36415 COLL VENOUS BLD VENIPUNCTURE: CPT

## 2022-09-19 PROCEDURE — 85025 COMPLETE CBC W/AUTO DIFF WBC: CPT

## 2022-09-19 PROCEDURE — 86140 C-REACTIVE PROTEIN: CPT

## 2022-09-19 PROCEDURE — 73630 X-RAY EXAM OF FOOT: CPT

## 2022-09-19 PROCEDURE — 85652 RBC SED RATE AUTOMATED: CPT

## 2022-09-19 PROCEDURE — 99214 OFFICE O/P EST MOD 30 MIN: CPT | Performed by: NURSE PRACTITIONER

## 2022-09-19 PROCEDURE — 84550 ASSAY OF BLOOD/URIC ACID: CPT

## 2022-09-19 RX ORDER — ATORVASTATIN CALCIUM 10 MG/1
10 TABLET, FILM COATED ORAL DAILY
Qty: 30 TABLET | Refills: 5 | Status: SHIPPED | OUTPATIENT
Start: 2022-09-19 | End: 2023-02-10 | Stop reason: SDUPTHER

## 2022-09-19 RX ORDER — INDOMETHACIN 25 MG/1
CAPSULE ORAL
Qty: 36 CAPSULE | Refills: 0 | Status: SHIPPED | OUTPATIENT
Start: 2022-09-19 | End: 2022-12-02

## 2022-09-19 RX ORDER — LISINOPRIL AND HYDROCHLOROTHIAZIDE 25; 20 MG/1; MG/1
1 TABLET ORAL EVERY MORNING
Qty: 30 TABLET | Refills: 5 | Status: SHIPPED | OUTPATIENT
Start: 2022-09-19 | End: 2023-02-10 | Stop reason: SDUPTHER

## 2022-09-19 RX ORDER — COLCHICINE 0.6 MG/1
TABLET ORAL
Qty: 3 TABLET | Refills: 0 | Status: SHIPPED | OUTPATIENT
Start: 2022-09-19 | End: 2022-12-02

## 2022-09-19 RX ORDER — METHYLPREDNISOLONE 4 MG/1
TABLET ORAL
Qty: 21 TABLET | Refills: 0 | Status: SHIPPED | OUTPATIENT
Start: 2022-09-19 | End: 2022-12-02

## 2022-09-19 RX ORDER — BUPROPION HYDROCHLORIDE 150 MG/1
150 TABLET ORAL EVERY MORNING
Qty: 30 TABLET | Refills: 2 | Status: SHIPPED | OUTPATIENT
Start: 2022-09-19 | End: 2023-02-10 | Stop reason: SDUPTHER

## 2022-09-19 RX ORDER — FLUOXETINE HYDROCHLORIDE 20 MG/1
20 CAPSULE ORAL DAILY
Qty: 30 CAPSULE | Refills: 5 | Status: SHIPPED | OUTPATIENT
Start: 2022-09-19 | End: 2023-02-10 | Stop reason: SDUPTHER

## 2022-09-19 NOTE — PROGRESS NOTES
"Chief Complaint  Foot Swelling and foot pain    Subjective        Michelle Berg presents to [facility]  History of Present Illness  C/O of left pain and swelling x 3 days.  Pain and swelling started with her big toe and spread up to her ankle.      Objective   Vital Signs:  /84   Pulse 81   Ht 167.6 cm (66\")   Wt 88.9 kg (196 lb)   BMI 31.64 kg/m²   Estimated body mass index is 31.64 kg/m² as calculated from the following:    Height as of this encounter: 167.6 cm (66\").    Weight as of this encounter: 88.9 kg (196 lb).        Physical Exam  Constitutional:       Appearance: Normal appearance. She is well-developed.   HENT:      Head: Normocephalic and atraumatic.      Right Ear: External ear normal.      Left Ear: External ear normal.      Nose: Nose normal. No nasal tenderness or congestion.      Mouth/Throat:      Lips: Pink. No lesions.      Mouth: Mucous membranes are moist. No oral lesions.      Dentition: Normal dentition.      Pharynx: Oropharynx is clear. No pharyngeal swelling, oropharyngeal exudate or posterior oropharyngeal erythema.   Eyes:      General: Lids are normal. Vision grossly intact. No scleral icterus.        Right eye: No discharge.         Left eye: No discharge.      Extraocular Movements: Extraocular movements intact.      Conjunctiva/sclera: Conjunctivae normal.      Right eye: Right conjunctiva is not injected.      Left eye: Left conjunctiva is not injected.      Pupils: Pupils are equal, round, and reactive to light.   Cardiovascular:      Rate and Rhythm: Normal rate and regular rhythm.      Heart sounds: Normal heart sounds. No murmur heard.    No gallop.   Pulmonary:      Effort: Pulmonary effort is normal.      Breath sounds: Normal breath sounds and air entry. No wheezing, rhonchi or rales.   Musculoskeletal:         General: Swelling and tenderness present. No deformity. Normal range of motion.      Cervical back: Full passive range of " motion without pain, normal range of motion and neck supple.      Right lower leg: No edema.      Left lower leg: No edema.      Comments: Left great toe pain with erythema   Skin:     General: Skin is warm and dry.      Coloration: Skin is not jaundiced.      Findings: No rash.   Neurological:      Mental Status: She is alert and oriented to person, place, and time.      Cranial Nerves: Cranial nerves are intact.      Sensory: Sensation is intact.      Motor: Motor function is intact.      Coordination: Coordination is intact.      Gait: Gait is intact.   Psychiatric:         Attention and Perception: Attention normal.         Mood and Affect: Mood and affect normal.         Behavior: Behavior is not hyperactive. Behavior is cooperative.         Thought Content: Thought content normal.         Judgment: Judgment normal.        Result Review :                Assessment and Plan   Diagnoses and all orders for this visit:    1. Left foot pain (Primary)  -     XR Foot 3+ View Left; Future  -     CBC & Differential; Future  -     Comprehensive Metabolic Panel; Future  -     C-reactive Protein; Future  -     Sedimentation Rate; Future  -     Uric Acid; Future    2. Acute gout involving toe of left foot, unspecified cause  -     methylPREDNISolone (MEDROL) 4 MG dose pack; Take as directed on package instructions.  Dispense: 21 tablet; Refill: 0  -     indomethacin (INDOCIN) 25 MG capsule; Take 2 capsules tid x 3 days, take 1 capsule tid x 3 days, take 1 capsule bid x 3 days then take 1 capsule daily x 3 days  Dispense: 36 capsule; Refill: 0  -     colchicine 0.6 MG tablet; Take 2 tablets x 1 and 1 tablet 1 hour later  Dispense: 3 tablet; Refill: 0    3. Essential hypertension  -     lisinopril-hydrochlorothiazide (PRINZIDE,ZESTORETIC) 20-25 MG per tablet; Take 1 tablet by mouth Every Morning.  Dispense: 30 tablet; Refill: 5    4. Mixed anxiety and depressive disorder  -     FLUoxetine (PROzac) 20 MG capsule; Take 1  capsule by mouth Daily.  Dispense: 30 capsule; Refill: 5  -     buPROPion XL (Wellbutrin XL) 150 MG 24 hr tablet; Take 1 tablet by mouth Every Morning.  Dispense: 30 tablet; Refill: 2    5. Hypercholesteremia  -     atorvastatin (LIPITOR) 10 MG tablet; Take 1 tablet by mouth Daily.  Dispense: 30 tablet; Refill: 5    6. Elevated LDL cholesterol level  -     atorvastatin (LIPITOR) 10 MG tablet; Take 1 tablet by mouth Daily.  Dispense: 30 tablet; Refill: 5    Patient comes in today complaining of left great toe pain and redness.  This is been going on for 3 days.  Very suspicious for gout on exam.  We will proceed with x-ray and labs.    X-ray did not show any obvious evidence of gout.  There was minimal osteoarthritis change as well as a calcaneal spur and accessory navicular ossicle noted.  CBC showed a mildly elevated WBC.  Sed rate was normal.  Other labs are pending.    Dr. Malhotra viewed x-ray.  Discussed case with Dr. Malhotra.  He agrees with recommendation to treat as gout.  We will call with rest of lab work.  Patient to return with continuing or worsening symptoms.    Patient did request refills on her other chronic medications.         Follow Up   Return if symptoms worsen or fail to improve.  Patient was given instructions and counseling regarding her condition or for health maintenance advice. Please see specific information pulled into the AVS if appropriate.

## 2022-09-20 ENCOUNTER — TELEPHONE (OUTPATIENT)
Dept: FAMILY MEDICINE CLINIC | Facility: CLINIC | Age: 67
End: 2022-09-20

## 2022-09-20 LAB — CRP SERPL-MCNC: 2.27 MG/DL (ref 0–0.5)

## 2022-09-20 NOTE — TELEPHONE ENCOUNTER
----- Message from JAIME Zavala sent at 9/20/2022  1:20 PM CDT -----  CRP elevated indicating inflammation. Potassium slightly low- increase in diet. Uric acid elevated indicating gout. I treated for this when pt was in office.

## 2022-09-20 NOTE — TELEPHONE ENCOUNTER
Called patient and informed of CRP elevated indicating inflammation. Potassium slightly low- increase in diet. Uric acid elevated indicating gout. VU.

## 2022-12-02 ENCOUNTER — TELEMEDICINE (OUTPATIENT)
Dept: FAMILY MEDICINE CLINIC | Facility: CLINIC | Age: 67
End: 2022-12-02

## 2022-12-02 ENCOUNTER — TELEPHONE (OUTPATIENT)
Dept: FAMILY MEDICINE CLINIC | Facility: CLINIC | Age: 67
End: 2022-12-02

## 2022-12-02 VITALS — WEIGHT: 196 LBS | BODY MASS INDEX: 31.5 KG/M2 | HEIGHT: 66 IN

## 2022-12-02 DIAGNOSIS — R05.1 ACUTE COUGH: ICD-10-CM

## 2022-12-02 DIAGNOSIS — J06.9 UPPER RESPIRATORY TRACT INFECTION, UNSPECIFIED TYPE: Primary | ICD-10-CM

## 2022-12-02 DIAGNOSIS — E66.9 CLASS 1 OBESITY WITH BODY MASS INDEX (BMI) OF 31.0 TO 31.9 IN ADULT, UNSPECIFIED OBESITY TYPE, UNSPECIFIED WHETHER SERIOUS COMORBIDITY PRESENT: ICD-10-CM

## 2022-12-02 PROBLEM — E66.811 CLASS 1 OBESITY WITH BODY MASS INDEX (BMI) OF 31.0 TO 31.9 IN ADULT: Status: ACTIVE | Noted: 2022-12-02

## 2022-12-02 PROCEDURE — 99213 OFFICE O/P EST LOW 20 MIN: CPT | Performed by: NURSE PRACTITIONER

## 2022-12-02 RX ORDER — DEXTROMETHORPHAN HYDROBROMIDE AND PROMETHAZINE HYDROCHLORIDE 15; 6.25 MG/5ML; MG/5ML
5 SYRUP ORAL NIGHTLY PRN
Qty: 118 ML | Refills: 0 | Status: SHIPPED | OUTPATIENT
Start: 2022-12-02 | End: 2023-02-10

## 2022-12-02 RX ORDER — METHYLPREDNISOLONE 4 MG/1
TABLET ORAL
Qty: 1 EACH | Refills: 0 | Status: SHIPPED | OUTPATIENT
Start: 2022-12-02 | End: 2023-02-10

## 2022-12-02 RX ORDER — AZITHROMYCIN 250 MG/1
TABLET, FILM COATED ORAL
Qty: 6 TABLET | Refills: 0 | Status: SHIPPED | OUTPATIENT
Start: 2022-12-02 | End: 2023-02-10

## 2022-12-02 RX ORDER — BENZONATATE 100 MG/1
100 CAPSULE ORAL 3 TIMES DAILY PRN
Qty: 90 CAPSULE | Refills: 0 | Status: SHIPPED | OUTPATIENT
Start: 2022-12-02

## 2022-12-02 NOTE — TELEPHONE ENCOUNTER
Caller: Michelle Berg    Relationship: Self    Best call back number: [phone]    What medication are you requesting: Z-PACK, COUGH SYRUP    What are your current symptoms: COUGHING, WHEEZING    How long have you been experiencing symptoms: 11.27.2022    Have you had these symptoms before:    [x] Yes  [] No    Have you been treated for these symptoms before:   [x] Yes  [] No    If a prescription is needed, what is your preferred pharmacy and phone number:  [facility] [address] [place] [address] [facility] [phone] [facility] [phone]

## 2022-12-02 NOTE — TELEPHONE ENCOUNTER
Contacted pt back, verified name and . Advised appt needed and offered to schedule. Pt vu and requested. Scheduled pt appt.

## 2022-12-02 NOTE — PROGRESS NOTES
"This was an audio and video enabled telemedicine encounter. Patient verbally consented to visit. Patient was located at Work and I was located at [facility]  location.     Chief Complaint  Cough    Subjective    History of Present Illness      Patient presents to [facility] for   History of Present Illness  Patient complains of cough,, sore throat, and sinus congestion.       Review of Systems   Constitutional: Negative.    HENT: Positive for congestion, postnasal drip, rhinorrhea and sinus pressure.    Eyes: Negative.    Respiratory: Positive for cough and wheezing.    Cardiovascular: Negative.    Gastrointestinal: Negative.    Endocrine: Negative.    Genitourinary: Negative.    Musculoskeletal: Negative.    Skin: Negative.    Allergic/Immunologic: Negative.    Neurological: Negative.    Hematological: Negative.    Psychiatric/Behavioral: Negative.        I have reviewed and agree with the HPI and [facility] information as above.  Stephania Garcia, APRN     Objective   Vital Signs:   Ht 167.6 cm (66\")   Wt 88.9 kg (196 lb)   BMI 31.64 kg/m²     BMI is >= 30 and <35. (Class 1 Obesity). The following options were offered after discussion;: weight loss educational material (shared in after visit summary)      Physical Exam  Vitals and nursing note reviewed.   Constitutional:       Appearance: Normal appearance. She is well-developed.   HENT:      Head: Normocephalic and atraumatic.      Mouth/Throat:      Lips: Pink. No lesions.   Eyes:      General: Lids are normal. Vision grossly intact.      Conjunctiva/sclera: Conjunctivae normal.      Right eye: Right conjunctiva is not injected.      Left eye: Left conjunctiva is not injected.   Pulmonary:      Effort: Pulmonary effort is normal.   Musculoskeletal:         General: Normal range of motion.      Cervical back: Full passive range of motion without pain, normal range of motion and neck supple.   Skin:     General: Skin is dry. "   Neurological:      Mental Status: She is alert and oriented to person, place, and time.      Motor: Motor function is intact.   Psychiatric:         Mood and Affect: Mood and affect normal.         Judgment: Judgment normal.          SCOTT-7:      PHQ-2 Depression Screening  Little interest or pleasure in doing things?     Feeling down, depressed, or hopeless?     PHQ-2 Total Score       PHQ-9 Depression Screening  Little interest or pleasure in doing things?     Feeling down, depressed, or hopeless?     Trouble falling or staying asleep, or sleeping too much?     Feeling tired or having little energy?     Poor appetite or overeating?     Feeling bad about yourself - or that you are a failure or have let yourself or your family down?     Trouble concentrating on things, such as reading the newspaper or watching television?     Moving or speaking so slowly that other people could have noticed? Or the opposite - being so fidgety or restless that you have been moving around a lot more than usual?     Thoughts that you would be better off dead, or of hurting yourself in some way?     PHQ-9 Total Score     If you checked off any problems, how difficult have these problems made it for you to do your work, take care of things at home, or get along with other people?        Result Review  Data Reviewed:                Assessment and Plan      Diagnoses and all orders for this visit:    1. Upper respiratory tract infection, unspecified type (Primary)  -     azithromycin (Zithromax Z-Aaron) 250 MG tablet; Take 2 tablets by mouth on day 1, then 1 tablet daily on days 2-5  Dispense: 6 tablet; Refill: 0  -     methylPREDNISolone (MEDROL) 4 MG dose pack; Take as directed on package instructions.  Dispense: 1 each; Refill: 0    2. Acute cough  -     promethazine-dextromethorphan (PROMETHAZINE-DM) 6.25-15 MG/5ML syrup; Take 5 mL by mouth At Night As Needed for Cough.  Dispense: 118 mL; Refill: 0  -     benzonatate (Tessalon Perles)  100 MG capsule; Take 1 capsule by mouth 3 (Three) Times a Day As Needed for Cough.  Dispense: 90 capsule; Refill: 0    3. Class 1 obesity with body mass index (BMI) of 31.0 to 31.9 in adult, unspecified obesity type, unspecified whether serious comorbidity present          Patient being seen through telehealth today with complaints of sore throat and cough.  She states her symptoms began on Sunday.  She also reports sinus congestion, pressure, drainage, and wheezing.  Denies any body aches, chills, or fever.  She did take a home COVID test which was negative.    Plan:    1.  Offered respiratory panel swab for COVID or flu swab.  Patient declines at this time.  2.  Start Z-Aaron and Medrol Dosepak.  3.  We will send Tessalon Perles to take for cough during the day and Promethazine DM to take at bedtime.  4.  Encouraged to use Flonase nasal spray, she states she has some of this at home.  5.  Offered to send in a rescue inhaler, patient states she has this at home and also has nebulizer treatments to use as needed.  6.  Follow-up if symptoms do not improve or become worse.      Follow Up   Return if symptoms worsen or fail to improve.  Patient was given instructions and counseling regarding her condition or for health maintenance advice. Please see specific information pulled into the AVS if appropriate.

## 2023-02-10 ENCOUNTER — OFFICE VISIT (OUTPATIENT)
Dept: FAMILY MEDICINE CLINIC | Facility: CLINIC | Age: 68
End: 2023-02-10
Payer: MEDICARE

## 2023-02-10 ENCOUNTER — TELEPHONE (OUTPATIENT)
Dept: FAMILY MEDICINE CLINIC | Facility: CLINIC | Age: 68
End: 2023-02-10
Payer: MEDICARE

## 2023-02-10 VITALS
DIASTOLIC BLOOD PRESSURE: 88 MMHG | WEIGHT: 193 LBS | HEART RATE: 82 BPM | HEIGHT: 66 IN | SYSTOLIC BLOOD PRESSURE: 140 MMHG | OXYGEN SATURATION: 99 % | BODY MASS INDEX: 31.02 KG/M2

## 2023-02-10 DIAGNOSIS — E78.00 HYPERCHOLESTEREMIA: ICD-10-CM

## 2023-02-10 DIAGNOSIS — J06.9 ACUTE URI: ICD-10-CM

## 2023-02-10 DIAGNOSIS — J30.81 CAT ALLERGIES: ICD-10-CM

## 2023-02-10 DIAGNOSIS — E66.9 CLASS 1 OBESITY WITH BODY MASS INDEX (BMI) OF 31.0 TO 31.9 IN ADULT, UNSPECIFIED OBESITY TYPE, UNSPECIFIED WHETHER SERIOUS COMORBIDITY PRESENT: ICD-10-CM

## 2023-02-10 DIAGNOSIS — I10 ESSENTIAL HYPERTENSION: Primary | ICD-10-CM

## 2023-02-10 DIAGNOSIS — H92.09 EARACHE: ICD-10-CM

## 2023-02-10 DIAGNOSIS — F41.8 MIXED ANXIETY AND DEPRESSIVE DISORDER: ICD-10-CM

## 2023-02-10 DIAGNOSIS — R09.81 NASAL CONGESTION: ICD-10-CM

## 2023-02-10 DIAGNOSIS — H65.90 FLUID COLLECTION OF MIDDLE EAR: ICD-10-CM

## 2023-02-10 DIAGNOSIS — R05.1 ACUTE COUGH: ICD-10-CM

## 2023-02-10 DIAGNOSIS — E78.00 ELEVATED LDL CHOLESTEROL LEVEL: ICD-10-CM

## 2023-02-10 PROCEDURE — 99214 OFFICE O/P EST MOD 30 MIN: CPT

## 2023-02-10 PROCEDURE — 96372 THER/PROPH/DIAG INJ SC/IM: CPT

## 2023-02-10 RX ORDER — MONTELUKAST SODIUM 10 MG/1
10 TABLET ORAL NIGHTLY
Qty: 30 TABLET | Refills: 5 | Status: SHIPPED | OUTPATIENT
Start: 2023-02-10

## 2023-02-10 RX ORDER — LISINOPRIL AND HYDROCHLOROTHIAZIDE 25; 20 MG/1; MG/1
1 TABLET ORAL EVERY MORNING
Qty: 30 TABLET | Refills: 5 | Status: SHIPPED | OUTPATIENT
Start: 2023-02-10

## 2023-02-10 RX ORDER — ATORVASTATIN CALCIUM 10 MG/1
10 TABLET, FILM COATED ORAL DAILY
Qty: 30 TABLET | Refills: 5 | Status: SHIPPED | OUTPATIENT
Start: 2023-02-10

## 2023-02-10 RX ORDER — FLUTICASONE PROPIONATE 50 MCG
2 SPRAY, SUSPENSION (ML) NASAL DAILY
Qty: 11.1 ML | Refills: 5 | Status: SHIPPED | OUTPATIENT
Start: 2023-02-10

## 2023-02-10 RX ORDER — BUPROPION HYDROCHLORIDE 150 MG/1
150 TABLET ORAL EVERY MORNING
Qty: 30 TABLET | Refills: 5 | Status: SHIPPED | OUTPATIENT
Start: 2023-02-10

## 2023-02-10 RX ORDER — DEXAMETHASONE SODIUM PHOSPHATE 4 MG/ML
8 INJECTION, SOLUTION INTRA-ARTICULAR; INTRALESIONAL; INTRAMUSCULAR; INTRAVENOUS; SOFT TISSUE ONCE
Status: COMPLETED | OUTPATIENT
Start: 2023-02-10 | End: 2023-02-10

## 2023-02-10 RX ORDER — CEFTRIAXONE 1 G/1
1 INJECTION, POWDER, FOR SOLUTION INTRAMUSCULAR; INTRAVENOUS EVERY 24 HOURS
Status: COMPLETED | OUTPATIENT
Start: 2023-02-10 | End: 2023-02-10

## 2023-02-10 RX ORDER — FLUOXETINE HYDROCHLORIDE 20 MG/1
20 CAPSULE ORAL DAILY
Qty: 30 CAPSULE | Refills: 5 | Status: SHIPPED | OUTPATIENT
Start: 2023-02-10

## 2023-02-10 RX ADMIN — CEFTRIAXONE 1 G: 1 INJECTION, POWDER, FOR SOLUTION INTRAMUSCULAR; INTRAVENOUS at 15:43

## 2023-02-10 RX ADMIN — DEXAMETHASONE SODIUM PHOSPHATE 8 MG: 4 INJECTION, SOLUTION INTRA-ARTICULAR; INTRALESIONAL; INTRAMUSCULAR; INTRAVENOUS; SOFT TISSUE at 15:43

## 2023-02-10 NOTE — PROGRESS NOTES
"Chief Complaint  Hypertension, Earache, Cough, and Nasal Congestion    Subjective    History of Present Illness      Patient presents to [facility] for   History of Present Illness  Pt is here today with c/o itching and pain in both ears, as well as cough and congestion for over a week now.    Pt is needing refills on medications as well -  Lipitor  Wellbutrin   Prozac  Lisinopril-HTC         Review of Systems   HENT: Positive for congestion and sinus pressure.    Respiratory: Positive for cough.    All other systems reviewed and are negative.      I have reviewed and agree with the HPI and ROS information as above.  Autumn Ruiz, APRN     Objective   Vital Signs:   /88   Pulse 82   Ht 167.6 cm (66\")   Wt 87.5 kg (193 lb)   SpO2 99%   BMI 31.15 kg/m²     BMI is >= 30 and <35. (Class 1 Obesity). The following options were offered after discussion;: exercise counseling/recommendations, nutrition counseling/recommendations and pharmacological intervention options      Physical Exam  Constitutional:       Appearance: Normal appearance. She is well-developed.   HENT:      Head: Normocephalic and atraumatic.      Right Ear: Ear canal and external ear normal. A middle ear effusion is present.      Left Ear: Ear canal and external ear normal. A middle ear effusion is present.      Nose: Congestion present. No septal deviation or nasal tenderness.      Mouth/Throat:      Lips: Pink. No lesions.      Mouth: Mucous membranes are moist. No oral lesions.      Dentition: Normal dentition.      Pharynx: Oropharynx is clear. No pharyngeal swelling, oropharyngeal exudate or posterior oropharyngeal erythema.   Eyes:      General: Lids are normal. Vision grossly intact. No scleral icterus.        Right eye: No discharge.         Left eye: No discharge.      Extraocular Movements: Extraocular movements intact.      Conjunctiva/sclera: Conjunctivae normal.      Right eye: Right conjunctiva is " not injected.      Left eye: Left conjunctiva is not injected.      Pupils: Pupils are equal, round, and reactive to light.   Neck:      Thyroid: No thyroid mass.      Trachea: Trachea normal.   Cardiovascular:      Rate and Rhythm: Normal rate and regular rhythm.      Heart sounds: Normal heart sounds. No murmur heard.    No gallop.   Pulmonary:      Effort: Pulmonary effort is normal.      Breath sounds: Normal breath sounds and air entry. No wheezing, rhonchi or rales.   Abdominal:      General: There is no distension.      Palpations: Abdomen is soft. There is no mass.      Tenderness: There is no abdominal tenderness. There is no right CVA tenderness, left CVA tenderness, guarding or rebound.   Musculoskeletal:         General: No tenderness or deformity. Normal range of motion.      Cervical back: Full passive range of motion without pain, normal range of motion and neck supple.      Thoracic back: Normal.      Right lower leg: No edema.      Left lower leg: No edema.   Skin:     General: Skin is warm and dry.      Coloration: Skin is not jaundiced.      Findings: No rash.   Neurological:      Mental Status: She is alert and oriented to person, place, and time.      Sensory: Sensation is intact.      Motor: Motor function is intact.      Coordination: Coordination is intact.      Gait: Gait is intact.      Deep Tendon Reflexes: Reflexes are normal and symmetric.   Psychiatric:         Mood and Affect: Mood and affect normal.         Judgment: Judgment normal.          SCOTT-7:      PHQ-2 Depression Screening  Little interest or pleasure in doing things?     Feeling down, depressed, or hopeless?     PHQ-2 Total Score       PHQ-9 Depression Screening  Little interest or pleasure in doing things?     Feeling down, depressed, or hopeless?     Trouble falling or staying asleep, or sleeping too much?     Feeling tired or having little energy?     Poor appetite or overeating?     Feeling bad about yourself - or that  you are a failure or have let yourself or your family down?     Trouble concentrating on things, such as reading the newspaper or watching television?     Moving or speaking so slowly that other people could have noticed? Or the opposite - being so fidgety or restless that you have been moving around a lot more than usual?     Thoughts that you would be better off dead, or of hurting yourself in some way?     PHQ-9 Total Score     If you checked off any problems, how difficult have these problems made it for you to do your work, take care of things at home, or get along with other people?        Result Review  Data Reviewed:                   Assessment and Plan      Diagnoses and all orders for this visit:    1. Essential hypertension (Primary)  -     lisinopril-hydrochlorothiazide (PRINZIDE,ZESTORETIC) 20-25 MG per tablet; Take 1 tablet by mouth Every Morning.  Dispense: 30 tablet; Refill: 5    2. Class 1 obesity with body mass index (BMI) of 31.0 to 31.9 in adult, unspecified obesity type, unspecified whether serious comorbidity present    3. Mixed anxiety and depressive disorder  -     buPROPion XL (Wellbutrin XL) 150 MG 24 hr tablet; Take 1 tablet by mouth Every Morning.  Dispense: 30 tablet; Refill: 5  -     FLUoxetine (PROzac) 20 MG capsule; Take 1 capsule by mouth Daily.  Dispense: 30 capsule; Refill: 5    4. Elevated LDL cholesterol level  -     atorvastatin (LIPITOR) 10 MG tablet; Take 1 tablet by mouth Daily.  Dispense: 30 tablet; Refill: 5    5. Nasal congestion    6. Acute cough    7. Earache    8. Hypercholesteremia  -     atorvastatin (LIPITOR) 10 MG tablet; Take 1 tablet by mouth Daily.  Dispense: 30 tablet; Refill: 5    9. Cat allergies  -     montelukast (Singulair) 10 MG tablet; Take 1 tablet by mouth Every Night.  Dispense: 30 tablet; Refill: 5    10. Fluid collection of middle ear  -     fluticasone (FLONASE) 50 MCG/ACT nasal spray; 2 sprays into the nostril(s) as directed by provider Daily.   Dispense: 11.1 mL; Refill: 5    11. Acute URI  -     dexamethasone (DECADRON) injection 8 mg  -     cefTRIAXone (ROCEPHIN) injection 1 g    Patient is seen today for chronic medication refills along with c/o nasal congestion, cough, and ear fullness for over a week. Patient states that she took in a stray cat and is afraid her allergies are from this due to her not being able to get over the illness. Patient denies any fever, sob, cough. Patient main complaint is fluid in her ears and ongoing congestion.     Patient is doing well on current medication regimens and wishes to continue same. Patient is due for chronic medication labs but wishes to get them done at next visit.    Plan  1. HTN stable with Lisinopril/HCTZ. Cont at home BP monitoring and low NA diet  2. Anxiety and Depression stable with Wellbutrin and Prozac. Patient denies any HI/SI   3. Cont Lipitor 10mg  4. Start Singulair 10mg to help with allergies. Medication Education done. S/E discussed  5. Start Flonase to help with middle ear fluid  6. Decadron and Rocephin given IM in office today   7. Educated that routine labs will be due at next visit; patient voices understanding.         Follow Up   Return in about 6 months (around 8/10/2023) for Hypertension.  Patient was given instructions and counseling regarding her condition or for health maintenance advice. Please see specific information pulled into the AVS if appropriate.

## 2023-02-10 NOTE — PROGRESS NOTES
After obtaining consent, and per orders of Autumn Mason, injection of Rocephin 1g given by Checo Arechiga MA. Patient instructed to remain in clinic for 20 minutes afterwards, and to report any adverse reaction to me immediately. Pt tolerated well with no adverse reactions.

## 2023-02-10 NOTE — PROGRESS NOTES
After obtaining consent, and per orders of Autumn SANDOVAL, injection of Decadron 8mg given by Checo Arechiga MA. Patient instructed to remain in clinic for 20 minutes afterwards, and to report any adverse reaction to me immediately. Pt tolerated well with no adverse reactions.

## 2023-09-19 ENCOUNTER — TELEPHONE (OUTPATIENT)
Dept: FAMILY MEDICINE CLINIC | Facility: CLINIC | Age: 68
End: 2023-09-19
Payer: MEDICARE

## 2023-09-19 NOTE — TELEPHONE ENCOUNTER
Called to schedule a Medicare Annual Wellness visit. No answer. Left a voicemail and mailed a letter.    Hub may read and schedule. Transfer the call to office staff if there is no availability with the PCP.

## 2023-10-18 DIAGNOSIS — I10 ESSENTIAL HYPERTENSION: ICD-10-CM

## 2023-10-18 RX ORDER — LISINOPRIL AND HYDROCHLOROTHIAZIDE 25; 20 MG/1; MG/1
1 TABLET ORAL EVERY MORNING
Qty: 30 TABLET | Refills: 0 | OUTPATIENT
Start: 2023-10-18

## 2023-11-15 ENCOUNTER — LAB (OUTPATIENT)
Dept: LAB | Facility: HOSPITAL | Age: 68
End: 2023-11-15
Payer: MEDICARE

## 2023-11-15 ENCOUNTER — OFFICE VISIT (OUTPATIENT)
Dept: FAMILY MEDICINE CLINIC | Facility: CLINIC | Age: 68
End: 2023-11-15
Payer: MEDICARE

## 2023-11-15 VITALS
OXYGEN SATURATION: 96 % | BODY MASS INDEX: 31.34 KG/M2 | SYSTOLIC BLOOD PRESSURE: 132 MMHG | HEIGHT: 66 IN | DIASTOLIC BLOOD PRESSURE: 84 MMHG | TEMPERATURE: 98.9 F | RESPIRATION RATE: 20 BRPM | HEART RATE: 85 BPM | WEIGHT: 195 LBS

## 2023-11-15 DIAGNOSIS — E78.00 ELEVATED LDL CHOLESTEROL LEVEL: ICD-10-CM

## 2023-11-15 DIAGNOSIS — I10 ESSENTIAL HYPERTENSION: ICD-10-CM

## 2023-11-15 DIAGNOSIS — J45.991 COUGH VARIANT ASTHMA: ICD-10-CM

## 2023-11-15 DIAGNOSIS — H65.93 FLUID LEVEL BEHIND TYMPANIC MEMBRANE OF BOTH EARS: ICD-10-CM

## 2023-11-15 DIAGNOSIS — F41.8 MIXED ANXIETY AND DEPRESSIVE DISORDER: ICD-10-CM

## 2023-11-15 DIAGNOSIS — Z71.3 ENCOUNTER FOR WEIGHT LOSS COUNSELING: ICD-10-CM

## 2023-11-15 DIAGNOSIS — R73.01 ELEVATED FASTING GLUCOSE: ICD-10-CM

## 2023-11-15 DIAGNOSIS — J06.9 UPPER RESPIRATORY TRACT INFECTION, UNSPECIFIED TYPE: Primary | ICD-10-CM

## 2023-11-15 DIAGNOSIS — E78.00 HYPERCHOLESTEREMIA: ICD-10-CM

## 2023-11-15 DIAGNOSIS — E66.09 CLASS 1 OBESITY DUE TO EXCESS CALORIES WITH SERIOUS COMORBIDITY AND BODY MASS INDEX (BMI) OF 31.0 TO 31.9 IN ADULT: ICD-10-CM

## 2023-11-15 DIAGNOSIS — E66.09 CLASS 1 OBESITY DUE TO EXCESS CALORIES WITH SERIOUS COMORBIDITY AND BODY MASS INDEX (BMI) OF 31.0 TO 31.9 IN ADULT: Primary | ICD-10-CM

## 2023-11-15 PROCEDURE — 3079F DIAST BP 80-89 MM HG: CPT

## 2023-11-15 PROCEDURE — 99214 OFFICE O/P EST MOD 30 MIN: CPT

## 2023-11-15 PROCEDURE — 1159F MED LIST DOCD IN RCRD: CPT

## 2023-11-15 PROCEDURE — 3075F SYST BP GE 130 - 139MM HG: CPT

## 2023-11-15 PROCEDURE — 0202U NFCT DS 22 TRGT SARS-COV-2: CPT

## 2023-11-15 PROCEDURE — 1160F RVW MEDS BY RX/DR IN RCRD: CPT

## 2023-11-15 RX ORDER — ATORVASTATIN CALCIUM 10 MG/1
10 TABLET, FILM COATED ORAL DAILY
Qty: 90 TABLET | Refills: 1 | Status: SHIPPED | OUTPATIENT
Start: 2023-11-15

## 2023-11-15 RX ORDER — LISINOPRIL AND HYDROCHLOROTHIAZIDE 25; 20 MG/1; MG/1
1 TABLET ORAL EVERY MORNING
Qty: 30 TABLET | Refills: 0 | Status: SHIPPED | OUTPATIENT
Start: 2023-11-15

## 2023-11-15 RX ORDER — DEXAMETHASONE SODIUM PHOSPHATE 4 MG/ML
8 INJECTION, SOLUTION INTRA-ARTICULAR; INTRALESIONAL; INTRAMUSCULAR; INTRAVENOUS; SOFT TISSUE ONCE
Status: COMPLETED | OUTPATIENT
Start: 2023-11-15 | End: 2023-11-15

## 2023-11-15 RX ORDER — FLUOXETINE HYDROCHLORIDE 20 MG/1
20 CAPSULE ORAL DAILY
Qty: 90 CAPSULE | Refills: 1 | Status: SHIPPED | OUTPATIENT
Start: 2023-11-15

## 2023-11-15 RX ORDER — PHENTERMINE HYDROCHLORIDE 15 MG/1
15 CAPSULE ORAL EVERY MORNING
Qty: 30 CAPSULE | Refills: 0 | Status: SHIPPED | OUTPATIENT
Start: 2023-11-15

## 2023-11-15 RX ORDER — DEXAMETHASONE SODIUM PHOSPHATE 10 MG/ML
8 INJECTION INTRAMUSCULAR; INTRAVENOUS ONCE
Status: DISCONTINUED | OUTPATIENT
Start: 2023-11-15 | End: 2023-11-15

## 2023-11-15 RX ORDER — LISINOPRIL AND HYDROCHLOROTHIAZIDE 25; 20 MG/1; MG/1
1 TABLET ORAL EVERY MORNING
Qty: 90 TABLET | Refills: 1 | Status: SHIPPED | OUTPATIENT
Start: 2023-11-15

## 2023-11-15 RX ADMIN — DEXAMETHASONE SODIUM PHOSPHATE 8 MG: 4 INJECTION, SOLUTION INTRA-ARTICULAR; INTRALESIONAL; INTRAMUSCULAR; INTRAVENOUS; SOFT TISSUE at 15:38

## 2023-11-15 NOTE — PROGRESS NOTES
"Chief Complaint  Cough, Nasal Congestion, Med Refill, and Hypertension    Subjective    History of Present Illness      Patient presents to [facility] for   History of Present Illness  Here for refills on her meds. She has sick symptoms for 3 days.        Review of Systems    I have reviewed and agree with the HPI and ROS information as above.  Gail CALDWELL Serbian, APRN     Objective   Vital Signs:   /84   Pulse 85   Temp 98.9 °F (37.2 °C)   Resp 20   Ht 167.6 cm (66\")   Wt 88.5 kg (195 lb)   SpO2 96%   BMI 31.47 kg/m²            Physical Exam  Vitals and nursing note reviewed.   Constitutional:       General: She is not in acute distress.     Appearance: Normal appearance. She is obese. She is not ill-appearing.   HENT:      Head: Normocephalic and atraumatic.      Right Ear: Ear canal and external ear normal. A middle ear effusion is present.      Left Ear: Ear canal and external ear normal. A middle ear effusion is present.      Nose: Rhinorrhea present.      Mouth/Throat:      Mouth: Mucous membranes are moist.      Pharynx: Oropharynx is clear. No oropharyngeal exudate or posterior oropharyngeal erythema.   Eyes:      Extraocular Movements: Extraocular movements intact.      Conjunctiva/sclera: Conjunctivae normal.      Pupils: Pupils are equal, round, and reactive to light.   Cardiovascular:      Rate and Rhythm: Normal rate and regular rhythm.      Pulses: Normal pulses.      Heart sounds: Normal heart sounds.   Pulmonary:      Effort: Pulmonary effort is normal. No respiratory distress.      Breath sounds: Normal breath sounds. No stridor. No wheezing, rhonchi or rales.      Comments: Lung sounds clear throughout bilaterally  Musculoskeletal:      Cervical back: Normal range of motion and neck supple. No rigidity or tenderness.   Lymphadenopathy:      Cervical: No cervical adenopathy.   Skin:     General: Skin is warm and dry.   Neurological:      Mental Status: She " is alert and oriented to person, place, and time. Mental status is at baseline.      GCS: GCS eye subscore is 4. GCS verbal subscore is 5. GCS motor subscore is 6.   Psychiatric:         Mood and Affect: Mood normal.         Behavior: Behavior normal.         Thought Content: Thought content normal.         Judgment: Judgment normal.          SCOTT-7: Over the last two weeks, how often have you been bothered by the following problems?  Feeling nervous, anxious or on edge: Several days  Not being able to stop or control worrying: Several days  Worrying too much about different things: Several days  Trouble Relaxing: Several days  Being so restless that it is hard to sit still: Several days  Becoming easily annoyed or irritable: Not at all  Feeling afraid as if something awful might happen: Several days  SCOTT 7 Total Score: 6  If you checked any problems, how difficult have these problems made it for you to do your work, take care of things at home, or get along with other people: Somewhat difficult    PHQ-2 Depression Screening  Little interest or pleasure in doing things? 0-->not at all   Feeling down, depressed, or hopeless? 0-->not at all   PHQ-2 Total Score 0     PHQ-9 Depression Screening  Little interest or pleasure in doing things? 0-->not at all   Feeling down, depressed, or hopeless? 0-->not at all   Trouble falling or staying asleep, or sleeping too much?     Feeling tired or having little energy?     Poor appetite or overeating?     Feeling bad about yourself - or that you are a failure or have let yourself or your family down?     Trouble concentrating on things, such as reading the newspaper or watching television?     Moving or speaking so slowly that other people could have noticed? Or the opposite - being so fidgety or restless that you have been moving around a lot more than usual?     Thoughts that you would be better off dead, or of hurting yourself in some way?     PHQ-9 Total Score 0   If you  checked off any problems, how difficult have these problems made it for you to do your work, take care of things at home, or get along with other people?        Result Review  Data Reviewed:            Office Visit with Autumn Ruiz APRN (02/10/2023)            Assessment and Plan      Diagnoses and all orders for this visit:    1. Upper respiratory tract infection, unspecified type (Primary)  -     Discontinue: dexAMETHasone (DECADRON) injection 8 mg  -     dexAMETHasone (DECADRON) injection 8 mg    2. Fluid level behind tympanic membrane of both ears    3. Cough variant asthma  -     Respiratory Panel PCR w/COVID-19(SARS-CoV-2) OLIVIA/EDITA/ROGER/PAD/COR/SHIRA In-House, NP Swab in UTM/VTM, 2 HR TAT - Swab, Nasopharynx    4. Essential hypertension  -     CBC Auto Differential  -     Comprehensive Metabolic Panel  -     lisinopril-hydrochlorothiazide (PRINZIDE,ZESTORETIC) 20-25 MG per tablet; Take 1 tablet by mouth Every Morning.  Dispense: 90 tablet; Refill: 1    5. Elevated LDL cholesterol level  -     Lipid Panel  -     atorvastatin (LIPITOR) 10 MG tablet; Take 1 tablet by mouth Daily.  Dispense: 90 tablet; Refill: 1    6. Elevated fasting glucose  -     Hemoglobin A1c    7. Class 1 obesity due to excess calories with serious comorbidity and body mass index (BMI) of 31.0 to 31.9 in adult    8. Encounter for weight loss counseling    9. Hypercholesteremia  -     atorvastatin (LIPITOR) 10 MG tablet; Take 1 tablet by mouth Daily.  Dispense: 90 tablet; Refill: 1    10. Mixed anxiety and depressive disorder  -     FLUoxetine (PROzac) 20 MG capsule; Take 1 capsule by mouth Daily.  Dispense: 90 capsule; Refill: 1      Patient is seen today following up on hypertension and hyperlipidemia.  She is also complaining of sick symptoms and is wanting to discuss weight loss management.    BMI is in the obese category at 31.47, blood pressure is stable at 132/84.  She is needing refills on her chronic medications as above, states she  is doing very well on this regimen and would like to continue same as her symptoms are well controlled.    She states she has attempted multiple different therapies at home to help with weight loss but this has been unsuccessful thus far.  She states she does not have much time to exercise and eats a lot of carbs/fast food.  We went through dietary changes that need to be made as well as exercise.  She is requesting phentermine which she has tried before.  We discussed that she has to start making diet and exercise changes or the phentermine is not going to work and she will have rebound weight gain after we take her off of it in a few months.  She is agreeable, voices understanding.  We will have her follow-up in 1 month for recheck.    Labs are needed at today's visit, I have ordered and she will complete.  She is requesting a flu shot, however she has been having sick symptoms for the last 4 days.  Reports cough, nasal congestion, itchy ears and eyes with rhinorrhea.  Vital signs are reassuring here in the office.  Lung sounds are clear throughout bilaterally, obvious nasal congestion on exam, bilateral middle ear effusions.  She is requesting a steroid shot which we will provide.  I have offered respiratory panel which she is agreeable to, will call her with results and determine further treatment based on this.  Recommend rest, increasing oral fluids, Tylenol/Motrin as needed, and Flonase as well as her regular oral antihistamine.    Of note, the patient did recently have a CT scan performed to the ER which noted a lung nodule.  They recommended a follow-up CT scan in 1 year.  She is due for an annual wellness exam, has never had a Medicare wellness, she will schedule before leaving today.    Plan:  1.  Labs pending  2.  Start phentermine  3.  Diet and exercise changes  4.  Decadron 8 mg IM given in office today  5.  Respiratory panel pending  6.  Repeat CT scan in 1 year  7.  Follow-up in 1 month        Follow  Up   Return in about 1 month (around 12/15/2023).  Patient was given instructions and counseling regarding her condition or for health maintenance advice. Please see specific information pulled into the AVS if appropriate.

## 2023-11-15 NOTE — PROGRESS NOTES
Injection  Injection performed in [facility] by Dari Pelayo RN. Patient tolerated the procedure well without complications.  11/15/23   Dari Pelayo RN

## 2023-11-16 ENCOUNTER — TELEPHONE (OUTPATIENT)
Dept: FAMILY MEDICINE CLINIC | Facility: CLINIC | Age: 68
End: 2023-11-16
Payer: MEDICARE

## 2023-11-16 DIAGNOSIS — J06.9 UPPER RESPIRATORY TRACT INFECTION, UNSPECIFIED TYPE: Primary | ICD-10-CM

## 2023-11-16 RX ORDER — AZITHROMYCIN 250 MG/1
TABLET, FILM COATED ORAL
Qty: 6 TABLET | Refills: 0 | Status: SHIPPED | OUTPATIENT
Start: 2023-11-16

## 2023-11-16 NOTE — TELEPHONE ENCOUNTER
----- Message from JAIME George sent at 11/16/2023  7:20 AM CST -----  Please let the patient know her respiratory panel is negative. Please send in a Z-pack for her. Recommend rest, increasing oral fluids, and tylenol/motrin as needed as well.

## 2023-11-22 ENCOUNTER — TELEPHONE (OUTPATIENT)
Dept: FAMILY MEDICINE CLINIC | Facility: CLINIC | Age: 68
End: 2023-11-22
Payer: MEDICARE

## 2023-11-22 NOTE — TELEPHONE ENCOUNTER
Pt contacted office and spoke to this nurse. Verified name and , Pt last seen 11/15/23. Pt requesting antibiotic. Pt reports her tooth broke off and she doesn't want it to get infected over the weekend. Advised would route to provider to advise. Nir of above and thanked staff.

## 2023-11-27 NOTE — TELEPHONE ENCOUNTER
"Contacted pt, verified name and , informed of below. Pt reports \"its too late now, never mind.\" Advised if needing anything further to contact the office. Vu of above and thanked staff.  "

## 2023-12-13 ENCOUNTER — TELEPHONE (OUTPATIENT)
Dept: FAMILY MEDICINE CLINIC | Facility: CLINIC | Age: 68
End: 2023-12-13
Payer: MEDICARE

## 2023-12-13 NOTE — TELEPHONE ENCOUNTER
LAYLA and sent My Chart to schedule Medicare Wellness exam. She has a physical scheduled for May of 2024, but needs to be a Medicare Wellness before the end of this year.

## 2023-12-15 ENCOUNTER — TELEPHONE (OUTPATIENT)
Dept: FAMILY MEDICINE CLINIC | Facility: CLINIC | Age: 68
End: 2023-12-15
Payer: MEDICARE

## 2024-01-12 ENCOUNTER — TELEPHONE (OUTPATIENT)
Dept: FAMILY MEDICINE CLINIC | Facility: CLINIC | Age: 69
End: 2024-01-12
Payer: MEDICARE

## 2024-05-13 ENCOUNTER — OFFICE VISIT (OUTPATIENT)
Dept: FAMILY MEDICINE CLINIC | Facility: CLINIC | Age: 69
End: 2024-05-13
Payer: MEDICARE

## 2024-05-13 ENCOUNTER — LAB (OUTPATIENT)
Dept: LAB | Facility: HOSPITAL | Age: 69
End: 2024-05-13
Payer: MEDICARE

## 2024-05-13 VITALS
RESPIRATION RATE: 20 BRPM | BODY MASS INDEX: 29.86 KG/M2 | DIASTOLIC BLOOD PRESSURE: 84 MMHG | WEIGHT: 185.8 LBS | OXYGEN SATURATION: 100 % | HEART RATE: 69 BPM | SYSTOLIC BLOOD PRESSURE: 125 MMHG | HEIGHT: 66 IN

## 2024-05-13 DIAGNOSIS — R30.0 DYSURIA: ICD-10-CM

## 2024-05-13 DIAGNOSIS — Z13.820 SCREENING FOR OSTEOPOROSIS: ICD-10-CM

## 2024-05-13 DIAGNOSIS — E66.3 OVERWEIGHT (BMI 25.0-29.9): Primary | ICD-10-CM

## 2024-05-13 DIAGNOSIS — Z78.0 POST-MENOPAUSAL: ICD-10-CM

## 2024-05-13 DIAGNOSIS — Z00.00 MEDICARE ANNUAL WELLNESS VISIT, SUBSEQUENT: Primary | ICD-10-CM

## 2024-05-13 DIAGNOSIS — E78.00 HYPERCHOLESTEREMIA: ICD-10-CM

## 2024-05-13 DIAGNOSIS — Z12.31 ENCOUNTER FOR SCREENING MAMMOGRAM FOR MALIGNANT NEOPLASM OF BREAST: ICD-10-CM

## 2024-05-13 DIAGNOSIS — E66.3 OVERWEIGHT (BMI 25.0-29.9): ICD-10-CM

## 2024-05-13 DIAGNOSIS — E78.00 ELEVATED LDL CHOLESTEROL LEVEL: ICD-10-CM

## 2024-05-13 DIAGNOSIS — Z71.3 ENCOUNTER FOR WEIGHT LOSS COUNSELING: ICD-10-CM

## 2024-05-13 DIAGNOSIS — R91.1 LUNG NODULE: ICD-10-CM

## 2024-05-13 DIAGNOSIS — I10 ESSENTIAL HYPERTENSION: ICD-10-CM

## 2024-05-13 DIAGNOSIS — E55.9 VITAMIN D DEFICIENCY: ICD-10-CM

## 2024-05-13 DIAGNOSIS — F41.8 MIXED ANXIETY AND DEPRESSIVE DISORDER: ICD-10-CM

## 2024-05-13 DIAGNOSIS — Z23 IMMUNIZATION DUE: ICD-10-CM

## 2024-05-13 DIAGNOSIS — J30.81 CAT ALLERGIES: ICD-10-CM

## 2024-05-13 LAB
25(OH)D3 SERPL-MCNC: 12.8 NG/ML (ref 30–100)
ALBUMIN SERPL-MCNC: 4.4 G/DL (ref 3.5–5)
ALBUMIN/GLOB SERPL: 1.2 G/DL (ref 1.1–2.5)
ALP SERPL-CCNC: 102 U/L (ref 24–120)
ALT SERPL W P-5'-P-CCNC: 37 U/L (ref 0–35)
ANION GAP SERPL CALCULATED.3IONS-SCNC: 10 MMOL/L (ref 4–13)
AST SERPL-CCNC: 33 U/L (ref 7–45)
BACTERIA UR QL AUTO: NORMAL /HPF
BASOPHILS # BLD AUTO: 0.05 10*3/MM3 (ref 0–0.2)
BASOPHILS NFR BLD AUTO: 0.6 % (ref 0–1.5)
BILIRUB SERPL-MCNC: 0.6 MG/DL (ref 0.1–1)
BILIRUB UR QL STRIP: NEGATIVE
BUN SERPL-MCNC: 12 MG/DL (ref 5–21)
BUN/CREAT SERPL: 16
CALCIUM SPEC-SCNC: 9.8 MG/DL (ref 8.6–10.5)
CHLORIDE SERPL-SCNC: 99 MMOL/L (ref 98–110)
CHOLEST SERPL-MCNC: 229 MG/DL (ref 130–200)
CLARITY UR: CLEAR
CO2 SERPL-SCNC: 28 MMOL/L (ref 24–31)
COLOR UR: YELLOW
CREAT SERPL-MCNC: 0.75 MG/DL (ref 0.5–1.4)
DEPRECATED RDW RBC AUTO: 42.2 FL (ref 37–54)
EGFRCR SERPLBLD CKD-EPI 2021: 87.4 ML/MIN/1.73
EOSINOPHIL # BLD AUTO: 0.43 10*3/MM3 (ref 0–0.4)
EOSINOPHIL NFR BLD AUTO: 4.7 % (ref 0.3–6.2)
ERYTHROCYTE [DISTWIDTH] IN BLOOD BY AUTOMATED COUNT: 13.2 % (ref 12.3–15.4)
GLOBULIN UR ELPH-MCNC: 3.7 GM/DL
GLUCOSE SERPL-MCNC: 103 MG/DL (ref 70–100)
GLUCOSE UR STRIP-MCNC: NEGATIVE MG/DL
HBA1C MFR BLD: 6 % (ref 4.8–5.9)
HCT VFR BLD AUTO: 44.2 % (ref 34–46.6)
HDLC SERPL-MCNC: 49 MG/DL
HGB BLD-MCNC: 14.4 G/DL (ref 12–15.9)
HGB UR QL STRIP.AUTO: NEGATIVE
HYALINE CASTS UR QL AUTO: NORMAL /LPF
IMM GRANULOCYTES # BLD AUTO: 0.03 10*3/MM3 (ref 0–0.05)
IMM GRANULOCYTES NFR BLD AUTO: 0.3 % (ref 0–0.5)
KETONES UR QL STRIP: NEGATIVE
LDLC SERPL CALC-MCNC: 155 MG/DL (ref 0–99)
LDLC/HDLC SERPL: 3.12 {RATIO}
LEUKOCYTE ESTERASE UR QL STRIP.AUTO: ABNORMAL
LYMPHOCYTES # BLD AUTO: 2.52 10*3/MM3 (ref 0.7–3.1)
LYMPHOCYTES NFR BLD AUTO: 27.7 % (ref 19.6–45.3)
MCH RBC QN AUTO: 28.5 PG (ref 26.6–33)
MCHC RBC AUTO-ENTMCNC: 32.6 G/DL (ref 31.5–35.7)
MCV RBC AUTO: 87.4 FL (ref 79–97)
MONOCYTES # BLD AUTO: 0.81 10*3/MM3 (ref 0.1–0.9)
MONOCYTES NFR BLD AUTO: 8.9 % (ref 5–12)
NEUTROPHILS NFR BLD AUTO: 5.25 10*3/MM3 (ref 1.7–7)
NEUTROPHILS NFR BLD AUTO: 57.8 % (ref 42.7–76)
NITRITE UR QL STRIP: NEGATIVE
NRBC BLD AUTO-RTO: 0 /100 WBC (ref 0–0.2)
PH UR STRIP.AUTO: 6 [PH] (ref 5–8)
PLATELET # BLD AUTO: 337 10*3/MM3 (ref 140–450)
PMV BLD AUTO: 11.6 FL (ref 6–12)
POTASSIUM SERPL-SCNC: 3.3 MMOL/L (ref 3.5–5.3)
PROT SERPL-MCNC: 8.1 G/DL (ref 6.3–8.7)
PROT UR QL STRIP: NEGATIVE
RBC # BLD AUTO: 5.06 10*6/MM3 (ref 3.77–5.28)
RBC # UR STRIP: NORMAL /HPF
REF LAB TEST METHOD: NORMAL
SODIUM SERPL-SCNC: 137 MMOL/L (ref 135–145)
SP GR UR STRIP: 1.01 (ref 1–1.03)
SQUAMOUS #/AREA URNS HPF: NORMAL /HPF
TRIGL SERPL-MCNC: 136 MG/DL (ref 0–149)
TSH SERPL DL<=0.05 MIU/L-ACNC: 2.41 UIU/ML (ref 0.27–4.2)
UROBILINOGEN UR QL STRIP: ABNORMAL
VLDLC SERPL-MCNC: 25 MG/DL (ref 5–40)
WBC # UR STRIP: NORMAL /HPF
WBC NRBC COR # BLD AUTO: 9.09 10*3/MM3 (ref 3.4–10.8)

## 2024-05-13 PROCEDURE — 3074F SYST BP LT 130 MM HG: CPT

## 2024-05-13 PROCEDURE — 1159F MED LIST DOCD IN RCRD: CPT

## 2024-05-13 PROCEDURE — 1160F RVW MEDS BY RX/DR IN RCRD: CPT

## 2024-05-13 PROCEDURE — 99214 OFFICE O/P EST MOD 30 MIN: CPT

## 2024-05-13 PROCEDURE — 81001 URINALYSIS AUTO W/SCOPE: CPT

## 2024-05-13 PROCEDURE — 90677 PCV20 VACCINE IM: CPT

## 2024-05-13 PROCEDURE — 82306 VITAMIN D 25 HYDROXY: CPT

## 2024-05-13 PROCEDURE — 3079F DIAST BP 80-89 MM HG: CPT

## 2024-05-13 PROCEDURE — 83036 HEMOGLOBIN GLYCOSYLATED A1C: CPT

## 2024-05-13 PROCEDURE — 36415 COLL VENOUS BLD VENIPUNCTURE: CPT

## 2024-05-13 PROCEDURE — G0438 PPPS, INITIAL VISIT: HCPCS

## 2024-05-13 PROCEDURE — 85025 COMPLETE CBC W/AUTO DIFF WBC: CPT

## 2024-05-13 PROCEDURE — 84443 ASSAY THYROID STIM HORMONE: CPT

## 2024-05-13 PROCEDURE — 1170F FXNL STATUS ASSESSED: CPT

## 2024-05-13 PROCEDURE — G0009 ADMIN PNEUMOCOCCAL VACCINE: HCPCS

## 2024-05-13 PROCEDURE — 80053 COMPREHEN METABOLIC PANEL: CPT

## 2024-05-13 PROCEDURE — 80061 LIPID PANEL: CPT

## 2024-05-13 RX ORDER — LISINOPRIL AND HYDROCHLOROTHIAZIDE 25; 20 MG/1; MG/1
1 TABLET ORAL EVERY MORNING
Qty: 90 TABLET | Refills: 1 | Status: SHIPPED | OUTPATIENT
Start: 2024-05-13

## 2024-05-13 RX ORDER — PHENTERMINE HYDROCHLORIDE 37.5 MG/1
37.5 CAPSULE ORAL EVERY MORNING
Qty: 30 CAPSULE | Refills: 2 | Status: SHIPPED | OUTPATIENT
Start: 2024-05-13

## 2024-05-13 RX ORDER — ZOSTER VACCINE RECOMBINANT, ADJUVANTED 50 MCG/0.5
0.5 KIT INTRAMUSCULAR ONCE
Qty: 1 EACH | Refills: 1 | Status: SHIPPED | OUTPATIENT
Start: 2024-05-13 | End: 2024-05-13

## 2024-05-13 RX ORDER — ATORVASTATIN CALCIUM 10 MG/1
10 TABLET, FILM COATED ORAL DAILY
Qty: 90 TABLET | Refills: 1 | Status: SHIPPED | OUTPATIENT
Start: 2024-05-13 | End: 2024-05-15

## 2024-05-13 RX ORDER — MONTELUKAST SODIUM 10 MG/1
10 TABLET ORAL NIGHTLY
Qty: 90 TABLET | Refills: 1 | Status: SHIPPED | OUTPATIENT
Start: 2024-05-13

## 2024-05-13 RX ORDER — FLUOXETINE HYDROCHLORIDE 20 MG/1
20 CAPSULE ORAL DAILY
Qty: 90 CAPSULE | Refills: 1 | Status: SHIPPED | OUTPATIENT
Start: 2024-05-13

## 2024-05-13 RX ORDER — PNEUMOCOCCAL 20-VALENT CONJUGATE VACCINE 2.2; 2.2; 2.2; 2.2; 2.2; 2.2; 2.2; 2.2; 2.2; 2.2; 2.2; 2.2; 2.2; 2.2; 2.2; 2.2; 4.4; 2.2; 2.2; 2.2 UG/.5ML; UG/.5ML; UG/.5ML; UG/.5ML; UG/.5ML; UG/.5ML; UG/.5ML; UG/.5ML; UG/.5ML; UG/.5ML; UG/.5ML; UG/.5ML; UG/.5ML; UG/.5ML; UG/.5ML; UG/.5ML; UG/.5ML; UG/.5ML; UG/.5ML; UG/.5ML
0.5 INJECTION, SUSPENSION INTRAMUSCULAR ONCE
Qty: 0.5 ML | Refills: 0 | Status: SHIPPED | OUTPATIENT
Start: 2024-05-13 | End: 2024-05-13

## 2024-05-13 NOTE — PROGRESS NOTES
The ABCs of the Annual Wellness Visit  Initial Medicare Wellness Visit    Chief Complaint   Patient presents with    Medicare Wellness       Subjective   History of Present Illness:  Michelle Berg is a 69 y.o. female who presents for an Initial Medicare Wellness Visit. She is also presenting with a problem of dysuria and overweight.     HEALTH RISK ASSESSMENT    Recent Hospitalizations:  No hospitalization(s) within the last year.    Current Medical Providers:  Patient Care Team:  Gail Vegas APRN as PCP - General (Nurse Practitioner)  Aries Malhotra MD as PCP - Family Medicine    Smoking Status:  Social History     Tobacco Use   Smoking Status Never   Smokeless Tobacco Never       Alcohol Consumption:  Social History     Substance and Sexual Activity   Alcohol Use Never       Depression Screen:       5/13/2024     8:46 AM   PHQ-2/PHQ-9 Depression Screening   Little Interest or Pleasure in Doing Things 0-->not at all   Feeling Down, Depressed or Hopeless 0-->not at all   PHQ-9: Brief Depression Severity Measure Score 0       SCOTT:      PHQ-2 Depression Screening  Little interest or pleasure in doing things? 0-->not at all   Feeling down, depressed, or hopeless? 0-->not at all   PHQ-2 Total Score 0      PHQ-9 Depression Screening  Little interest or pleasure in doing things? 0-->not at all   Feeling down, depressed, or hopeless? 0-->not at all   Trouble falling or staying asleep, or sleeping too much?     Feeling tired or having little energy?     Poor appetite or overeating?     Feeling bad about yourself - or that you are a failure or have let yourself or your family down?     Trouble concentrating on things, such as reading the newspaper or watching television?     Moving or speaking so slowly that other people could have noticed? Or the opposite - being so fidgety or restless that you have been moving around a lot more than usual?     Thoughts that you would be better off dead, or of hurting yourself in  some way?     PHQ-9 Total Score 0   If you checked off any problems, how difficult have these problems made it for you to do your work, take care of things at home, or get along with other people?          Fall Risk Screen:  CHARLES Fall Risk Assessment was completed, and patient is at LOW risk for falls.Assessment completed on:2024    Health Habits and Functional and Cognitive Screenin/13/2024     8:45 AM   Functional & Cognitive Status   Do you have difficulty preparing food and eating? No   Do you have difficulty bathing yourself, getting dressed or grooming yourself? No   Do you have difficulty using the toilet? No   Do you have difficulty moving around from place to place? No   Do you have trouble with steps or getting out of a bed or a chair? No   Current Diet Low Carb Diet   Dental Exam Not up to date   Eye Exam Not up to date   Exercise (times per week) 6 times per week   Current Exercises Include Yard Work;Walking   Do you need help using the phone?  No   Are you deaf or do you have serious difficulty hearing?  No   Do you need help to go to places out of walking distance? No   Do you need help shopping? No   Do you need help preparing meals?  No   Do you need help with housework?  No   Do you need help with laundry? No   Do you need help taking your medications? No   Do you need help managing money? No   Do you ever drive or ride in a car without wearing a seat belt? No   Have you felt unusual stress, anger or loneliness in the last month? No   Who do you live with? Spouse   If you need help, do you have trouble finding someone available to you? No   Have you been bothered in the last four weeks by sexual problems? No   Do you have difficulty concentrating, remembering or making decisions? No            Does the patient have evidence of cognitive impairment? No    Asprin use counseling:Does not need ASA (and currently is not on it)    Age-appropriate Screening Schedule:  Refer to the list  below for future screening recommendations based on patient's age, sex and/or medical conditions. Orders for these recommended tests are listed in the plan section. The patient has been provided with a written plan.    Health Maintenance   Topic Date Due    MAMMOGRAM  Never done    DXA SCAN  Never done    Pneumococcal Vaccine 65+ (1 of 2 - PCV) Never done    TDAP/TD VACCINES (1 - Tdap) Never done    ZOSTER VACCINE (1 of 2) Never done    RSV Vaccine - Adults (1 - 1-dose 60+ series) Never done    COVID-19 Vaccine (3 - 2023-24 season) 09/01/2023    INFLUENZA VACCINE  08/01/2024    ANNUAL WELLNESS VISIT  05/13/2025    LIPID PANEL  05/13/2025    BMI FOLLOWUP  05/13/2025    HEPATITIS C SCREENING  Discontinued    COLORECTAL CANCER SCREENING  Discontinued          The following portions of the patient's history were reviewed and updated as appropriate: allergies, current medications, past family history, past medical history, past social history, past surgical history, and problem list.    Outpatient Medications Prior to Visit   Medication Sig Dispense Refill    fluticasone (FLONASE) 50 MCG/ACT nasal spray 2 sprays into the nostril(s) as directed by provider Daily. 11.1 mL 5    atorvastatin (LIPITOR) 10 MG tablet Take 1 tablet by mouth Daily. 90 tablet 1    FLUoxetine (PROzac) 20 MG capsule Take 1 capsule by mouth Daily. 90 capsule 1    lisinopril-hydrochlorothiazide (PRINZIDE,ZESTORETIC) 20-25 MG per tablet Take 1 tablet by mouth Every Morning. 90 tablet 1    montelukast (Singulair) 10 MG tablet Take 1 tablet by mouth Every Night. 30 tablet 5    phentermine 15 MG capsule Take 1 capsule by mouth Every Morning. 30 capsule 0    ipratropium-albuterol (DUO-NEB) 0.5-2.5 mg/3 ml nebulizer Take 3 mL by nebulization Every 4 (Four) Hours As Needed for Wheezing for up to 30 days. 360 mL 0    azithromycin (Zithromax) 250 MG tablet As directed 6 tablet 0    benzonatate (Tessalon Perles) 100 MG capsule Take 1 capsule by mouth 3 (Three)  "Times a Day As Needed for Cough. 90 capsule 0    lisinopril-hydrochlorothiazide (PRINZIDE,ZESTORETIC) 20-25 MG per tablet TAKE 1 TABLET BY MOUTH ONCE DAILY IN THE MORNING 30 tablet 0     No facility-administered medications prior to visit.       Patient Active Problem List   Diagnosis    Essential hypertension    Mixed anxiety and depressive disorder    Elevated LDL cholesterol level    Class 1 obesity with body mass index (BMI) of 31.0 to 31.9 in adult       Advanced Care Planning:  ACP discussion was declined by the patient. Patient does not have an advance directive, declines further assistance.    Review of Systems    Compared to one year ago, the patient feels her physical health is better.  Compared to one year ago, the patient feels her mental health is the same.    Reviewed chart for potential of high risk medication in the elderly: yes  Reviewed chart for potential of harmful drug interactions in the elderly:yes    Objective         Vitals:    05/13/24 0842   BP: 125/84   Pulse: 69   Resp: 20   SpO2: 100%   Weight: 84.3 kg (185 lb 12.8 oz)   Height: 167.6 cm (66\")       Body mass index is 29.99 kg/m².  Discussed the patient's BMI with her. The BMI is above average; BMI management plan is completed.    Physical Exam  Vitals and nursing note reviewed.   Constitutional:       Appearance: Normal appearance. She is overweight. She is not ill-appearing.   HENT:      Head: Normocephalic and atraumatic.      Right Ear: External ear normal.      Left Ear: External ear normal.      Nose: Nose normal.      Mouth/Throat:      Mouth: Mucous membranes are moist.      Pharynx: Oropharynx is clear.   Eyes:      Extraocular Movements: Extraocular movements intact.      Conjunctiva/sclera: Conjunctivae normal.      Pupils: Pupils are equal, round, and reactive to light.   Neck:      Thyroid: No thyroid mass or thyroid tenderness.   Cardiovascular:      Rate and Rhythm: Normal rate and regular rhythm.      Pulses: Normal " pulses.           Radial pulses are 2+ on the right side and 2+ on the left side.      Heart sounds: Normal heart sounds.   Pulmonary:      Effort: Pulmonary effort is normal.      Breath sounds: Normal breath sounds.   Abdominal:      General: Bowel sounds are normal. There is no distension.      Palpations: Abdomen is soft.      Tenderness: There is no abdominal tenderness.   Musculoskeletal:         General: Normal range of motion.      Cervical back: Normal range of motion.      Right lower leg: No edema.      Left lower leg: No edema.   Skin:     General: Skin is warm and dry.      Capillary Refill: Capillary refill takes less than 2 seconds.   Neurological:      General: No focal deficit present.      Mental Status: She is alert and oriented to person, place, and time. Mental status is at baseline.      GCS: GCS eye subscore is 4. GCS verbal subscore is 5. GCS motor subscore is 6.   Psychiatric:         Mood and Affect: Mood normal.         Behavior: Behavior normal.         Thought Content: Thought content normal.         Judgment: Judgment normal.          Lab Results   Component Value Date    TRIG 136 05/13/2024    HDL 49 (L) 05/13/2024     (H) 05/13/2024    VLDL 25 05/13/2024    HGBA1C 6.0 (H) 05/13/2024        Data Reviewed:  Office Visit with Gail Vegas APRN (11/15/2023)   CT Angiogram Chest (06/24/2023 16:12)     Assessment & Plan   Medicare Risks and Personalized Health Plan  CMS Preventative Services Quick Reference  Breast Cancer/Mammogram Screening  Diabetic Lab Screening   Immunizations Discussed/Encouraged (specific immunizations; Tdap, Prevnar 20 (Pneumococcal 20-valent conjugate), Shingrix, and RSV (Respiratory Syncytial Virus) )  Obesity/Overweight   Osteoporosis Risk    The above risks/problems have been discussed with the patient.  Pertinent information has been shared with the patient in the After Visit Summary.  Follow up plans and orders are seen below in the Assessment/Plan  Section.    Problem List Items Addressed This Visit          Cardiac and Vasculature    Essential hypertension    Relevant Medications    lisinopril-hydrochlorothiazide (PRINZIDE,ZESTORETIC) 20-25 MG per tablet    Elevated LDL cholesterol level    Relevant Medications    atorvastatin (LIPITOR) 10 MG tablet       Mental Health    Mixed anxiety and depressive disorder    Relevant Medications    FLUoxetine (PROzac) 20 MG capsule     Other Visit Diagnoses       Medicare annual wellness visit, subsequent    -  Primary    Relevant Orders    Comprehensive Metabolic Panel (Completed)    Hemoglobin A1c (Completed)    Lipid Panel (Completed)    TSH    Urinalysis With Culture If Indicated - Urine, Clean Catch (Completed)    Vitamin D,25-Hydroxy    Urinalysis, Microscopic Only - Urine, Clean Catch (Completed)    CBC Auto Differential    Hypercholesteremia        Relevant Medications    atorvastatin (LIPITOR) 10 MG tablet    Overweight (BMI 25.0-29.9)        Screening for osteoporosis        Relevant Orders    DEXA Bone Density Axial    Encounter for screening mammogram for malignant neoplasm of breast        Relevant Orders    Mammo Screening Digital Tomosynthesis Bilateral With CAD    Post-menopausal        Relevant Orders    DEXA Bone Density Axial    Encounter for weight loss counseling        Lung nodule        Relevant Medications    montelukast (Singulair) 10 MG tablet    Other Relevant Orders    CT Chest Without Contrast    Vitamin D deficiency        Relevant Orders    Vitamin D,25-Hydroxy    Immunization due        Relevant Medications    Pneumococcal 20-Bernie Conj Vacc (Prevnar 20) 0.5 ML suspension prefilled syringe vaccine    Tdap (BOOSTRIX) 5-2.5-18.5 LF-MCG/0.5 injection    Zoster Vac Recomb Adjuvanted (Shingrix) 50 MCG/0.5ML reconstituted suspension    RSVPreF3 Vac Recomb Adjuvanted (AREXVY) 120 MCG/0.5ML reconstituted suspension injection    Cat allergies        Relevant Medications    montelukast (Singulair) 10  MG tablet    Dysuria        Relevant Orders    Urinalysis With Culture If Indicated - Urine, Clean Catch (Completed)           Eye exam results: Bilateral - 20/30; left - 20/50; right - 20/40.        Patient is seen today for an initial Medicare wellness exam; she is also wanting to address a problem of dysuria and overweight.    Labs are needed at today's visit, she is fasting and will complete.  We will call with results.  Blood pressure is satisfactory at 125/84, she is currently taking lisinopril-HCTZ and will continue same.  Recommend low-sodium diet.  She is also needing a refill on her Lipitor 10 mg daily, lipid panel will be checked with today's labs.  BMI is in the overweight category, recommend diet and exercise changes; she is requesting a refill on her phentermine which was previously at 15 mg.  She is agreeable to increasing the dose.  She has lost about 10 pounds since November.  CSA completed at today's visit.    Care gaps addressed: Mammogram and DEXA scan both due, patient is agreeable.  Tdap, shingles, and RSV vaccines due; patient agreeable.  I will order these to be given at pharmacy due to Medicare requirements, would like the  by at least 3 to 4 weeks.  She is also agreeable to pneumonia vaccine, Prevnar 20 given in office today.  States she has had a colonoscopy recently and they informed her that it was normal, recommended 10-year recall although I am not able to see these records.  Does not want to further address today.    She is needing a refill on her Prozac 20 mg daily, feels she is doing well and would like to continue same at this dose as her symptoms are well-controlled.  Patient denies HI/SI.  Also needs a refill on her Singulair, allergies well-controlled.    Physical exam is generally unremarkable, no acute abnormalities noted.  She is overweight and we have completed a plan for this as above.  Patient states she has been experiencing a little bit of burning with  urination, we are checking a UA today and will evaluate for any UTI.  She is a non-smoker and sees a dentist regularly.  Her eye exam is as above, I did recommend a screening to be performed by an eye doctor, she is agreeable and voices understanding.    Of note, patient will be due for repeat CT chest without contrast soon as she previously had a lung nodule noted incidentally on scan through the ER on 6/24/23.  I will go ahead and order this today for follow up.    She will follow-up in 3 months for weight check.    Plan:  1.  Labs pending, UA pending for dysuria  2.  Continue Lipitor 10 mg daily  3.  Continue lisinopril-HCTZ 20-25 mg daily; low-sodium diet, regular daily exercise  4.  Increase phentermine to 37.5 mg daily, CSA done  5.  Prevnar 20 vaccine given in office today  6.  Shingrix, Arexvy, and Tdap ordered to be given at pharmacy  7.  Mammogram pending  8.  DEXA pending  9.  Continue Prozac 20 mg daily  10.  Establish care with an eye doctor for a routine eye exam  11.  Repeat/follow-up CT chest pending  12.  Follow-up in 3 months    Follow Up:  Return in about 3 months (around 8/13/2024).     An After Visit Summary and PPPS were given to the patient.

## 2024-05-13 NOTE — PROGRESS NOTES
Immunization  Immunization Prevnar 20 performed in Left Deltoid by SHAQUILLE Bailey. Patient tolerated the procedure well without complications. VIS given in office.  05/13/24

## 2024-05-15 ENCOUNTER — TELEPHONE (OUTPATIENT)
Dept: FAMILY MEDICINE CLINIC | Facility: CLINIC | Age: 69
End: 2024-05-15
Payer: MEDICARE

## 2024-05-15 DIAGNOSIS — E78.00 ELEVATED LDL CHOLESTEROL LEVEL: ICD-10-CM

## 2024-05-15 DIAGNOSIS — E55.9 VITAMIN D DEFICIENCY: ICD-10-CM

## 2024-05-15 DIAGNOSIS — E78.00 HYPERCHOLESTEREMIA: ICD-10-CM

## 2024-05-15 DIAGNOSIS — R73.03 PREDIABETES: Primary | ICD-10-CM

## 2024-05-15 RX ORDER — ATORVASTATIN CALCIUM 20 MG/1
20 TABLET, FILM COATED ORAL DAILY
Qty: 90 TABLET | Refills: 0 | Status: SHIPPED | OUTPATIENT
Start: 2024-05-15

## 2024-05-15 RX ORDER — ATORVASTATIN CALCIUM 10 MG/1
20 TABLET, FILM COATED ORAL DAILY
Qty: 90 TABLET | Refills: 1 | Status: CANCELLED | OUTPATIENT
Start: 2024-05-15

## 2024-05-15 RX ORDER — ERGOCALCIFEROL 1.25 MG/1
50000 CAPSULE ORAL WEEKLY
Qty: 8 CAPSULE | Refills: 0 | Status: SHIPPED | OUTPATIENT
Start: 2024-05-15 | End: 2024-07-04

## 2024-05-15 NOTE — TELEPHONE ENCOUNTER
Called patient and confirmed date of birth. Relayed results/recommendations. Pt verbally understood all. She asked if her bilirubin was normal then the spot on her lung must be gone. Informed pt that, from what I can see, her bili has been normal the last 4 years. In addition, it does not indicate lung issues. Pt vu all.

## 2024-05-16 DIAGNOSIS — I10 ESSENTIAL HYPERTENSION: ICD-10-CM

## 2024-05-16 RX ORDER — LISINOPRIL AND HYDROCHLOROTHIAZIDE 25; 20 MG/1; MG/1
1 TABLET ORAL EVERY MORNING
Qty: 90 TABLET | Refills: 1 | Status: CANCELLED | OUTPATIENT
Start: 2024-05-16

## 2024-05-16 NOTE — TELEPHONE ENCOUNTER
Caller:     Michelle Berg        Relationship: SELF     Best call back number:     [phone]        Requested Prescriptions:   lisinopril-hydrochlorothiazide (PRINZIDE,ZESTORETIC) 20-25 MG per tablet  1 tablet, Every Morning          Pharmacy where request should be sent:  [address] - [phone] [facility] [phone]  [phone]     Last office visit with prescribing clinician: 5/13/2024   Last telemedicine visit with prescribing clinician: Visit date not found   Next office visit with prescribing clinician: 8/5/2024     Additional details provided by patient: SHE STATES ALL OF HER MEDICATIONS WERE SENT OVER TO THE PHARMACY EXCEPT THE LISNOPRIL     Does the patient have less than a 3 day supply:  [x] Yes  [] No    Would you like a call back once the refill request has been completed: [] Yes [x] No    If the office needs to give you a call back, can they leave a voicemail: [x] Yes [] No    Mason Wells Rep   05/16/24 08:03 CDT

## 2024-06-17 ENCOUNTER — TELEPHONE (OUTPATIENT)
Dept: FAMILY MEDICINE CLINIC | Facility: CLINIC | Age: 69
End: 2024-06-17
Payer: MEDICARE

## 2024-07-03 ENCOUNTER — TELEPHONE (OUTPATIENT)
Dept: FAMILY MEDICINE CLINIC | Facility: CLINIC | Age: 69
End: 2024-07-03
Payer: MEDICARE

## 2024-07-03 NOTE — TELEPHONE ENCOUNTER
Spoke with pt regarding mammogram. She was given the number for scheduling. She states that she will complete this after school starts.

## 2024-08-05 ENCOUNTER — TELEPHONE (OUTPATIENT)
Dept: FAMILY MEDICINE CLINIC | Facility: CLINIC | Age: 69
End: 2024-08-05
Payer: MEDICARE

## 2024-08-05 ENCOUNTER — LAB (OUTPATIENT)
Dept: LAB | Facility: HOSPITAL | Age: 69
End: 2024-08-05
Payer: MEDICARE

## 2024-08-05 ENCOUNTER — OFFICE VISIT (OUTPATIENT)
Dept: FAMILY MEDICINE CLINIC | Facility: CLINIC | Age: 69
End: 2024-08-05
Payer: MEDICARE

## 2024-08-05 ENCOUNTER — HOSPITAL ENCOUNTER (OUTPATIENT)
Dept: GENERAL RADIOLOGY | Facility: HOSPITAL | Age: 69
Discharge: HOME | End: 2024-08-05
Payer: MEDICARE

## 2024-08-05 VITALS
SYSTOLIC BLOOD PRESSURE: 125 MMHG | TEMPERATURE: 98.3 F | WEIGHT: 171 LBS | BODY MASS INDEX: 27.48 KG/M2 | HEIGHT: 66 IN | HEART RATE: 80 BPM | DIASTOLIC BLOOD PRESSURE: 79 MMHG

## 2024-08-05 DIAGNOSIS — Z71.3 ENCOUNTER FOR WEIGHT LOSS COUNSELING: ICD-10-CM

## 2024-08-05 DIAGNOSIS — I10 ESSENTIAL HYPERTENSION: Primary | ICD-10-CM

## 2024-08-05 DIAGNOSIS — R73.03 PREDIABETES: ICD-10-CM

## 2024-08-05 DIAGNOSIS — E66.3 OVERWEIGHT (BMI 25.0-29.9): ICD-10-CM

## 2024-08-05 DIAGNOSIS — E78.00 HYPERCHOLESTEREMIA: ICD-10-CM

## 2024-08-05 DIAGNOSIS — E78.2 MIXED HYPERLIPIDEMIA: ICD-10-CM

## 2024-08-05 DIAGNOSIS — M25.531 RIGHT WRIST PAIN: ICD-10-CM

## 2024-08-05 DIAGNOSIS — F41.8 MIXED ANXIETY AND DEPRESSIVE DISORDER: ICD-10-CM

## 2024-08-05 LAB
CHOLEST SERPL-MCNC: 163 MG/DL (ref 130–200)
HBA1C MFR BLD: 5.8 % (ref 4.8–5.9)
HDLC SERPL-MCNC: 42 MG/DL
LDLC SERPL CALC-MCNC: 95 MG/DL (ref 0–99)
LDLC/HDLC SERPL: 2.18 {RATIO}
TRIGL SERPL-MCNC: 147 MG/DL (ref 0–149)
VLDLC SERPL-MCNC: 26 MG/DL (ref 5–40)

## 2024-08-05 PROCEDURE — 73110 X-RAY EXAM OF WRIST: CPT

## 2024-08-05 PROCEDURE — 1159F MED LIST DOCD IN RCRD: CPT

## 2024-08-05 PROCEDURE — 73130 X-RAY EXAM OF HAND: CPT

## 2024-08-05 PROCEDURE — 3078F DIAST BP <80 MM HG: CPT

## 2024-08-05 PROCEDURE — 3074F SYST BP LT 130 MM HG: CPT

## 2024-08-05 PROCEDURE — 99214 OFFICE O/P EST MOD 30 MIN: CPT

## 2024-08-05 PROCEDURE — 83036 HEMOGLOBIN GLYCOSYLATED A1C: CPT

## 2024-08-05 PROCEDURE — 1160F RVW MEDS BY RX/DR IN RCRD: CPT

## 2024-08-05 PROCEDURE — 36415 COLL VENOUS BLD VENIPUNCTURE: CPT

## 2024-08-05 PROCEDURE — G2211 COMPLEX E/M VISIT ADD ON: HCPCS

## 2024-08-05 PROCEDURE — 80061 LIPID PANEL: CPT

## 2024-08-05 RX ORDER — ATORVASTATIN CALCIUM 20 MG/1
20 TABLET, FILM COATED ORAL DAILY
Qty: 90 TABLET | Refills: 0 | Status: SHIPPED | OUTPATIENT
Start: 2024-08-05

## 2024-08-05 NOTE — TELEPHONE ENCOUNTER
Sent pt LK FREEMAN message relaying below    HUB TO RELAY  Your cholesterol and A1c are much improved. Gail wants you to continue with your current medication regimen. Please let me know if you have any questions.

## 2024-08-05 NOTE — TELEPHONE ENCOUNTER
----- Message from Gail Vegas sent at 8/5/2024  8:37 AM CDT -----  Lipid panel is much improved, continue Lipitor 20.  A1c improved as well, now within goal range.

## 2024-08-05 NOTE — PROGRESS NOTES
"Chief Complaint  Hypertension, Hyperlipidemia, Med Refill, and Weight Check    Subjective    History of Present Illness      Patient presents to [facility] for   History of Present Illness  She states she is doing well. She is happy with her weight loss. States her mood is stable.   Hyperlipidemia         Review of Systems    I have reviewed and agree with the HPI and ROS information as above.  Gail Vegas, APRN     Objective   Vital Signs:   /79   Pulse 80   Temp 98.3 °F (36.8 °C)   Ht 167.6 cm (66\")   Wt 77.6 kg (171 lb)   BMI 27.60 kg/m²            Physical Exam  Vitals and nursing note reviewed.   Constitutional:       General: She is not in acute distress.     Appearance: Normal appearance. She is overweight. She is not ill-appearing.   HENT:      Head: Normocephalic and atraumatic.      Right Ear: External ear normal.      Left Ear: External ear normal.      Nose: Nose normal.   Eyes:      Conjunctiva/sclera: Conjunctivae normal.   Cardiovascular:      Rate and Rhythm: Normal rate and regular rhythm.      Pulses: Normal pulses.      Heart sounds: Normal heart sounds.   Pulmonary:      Effort: Pulmonary effort is normal.      Breath sounds: Normal breath sounds.   Musculoskeletal:      Right wrist: Tenderness present. No swelling, snuff box tenderness or crepitus. Normal range of motion. Normal pulse.      Right hand: Tenderness present. No swelling or bony tenderness. Normal range of motion. Normal strength. Normal sensation. There is no disruption of two-point discrimination. Normal capillary refill. Normal pulse.   Skin:     General: Skin is warm and dry.   Neurological:      Mental Status: She is alert and oriented to person, place, and time. Mental status is at baseline.      GCS: GCS eye subscore is 4. GCS verbal subscore is 5. GCS motor subscore is 6.   Psychiatric:         Mood and Affect: Mood normal.         Behavior: Behavior normal.         Thought " Content: Thought content normal.         Judgment: Judgment normal.          SCOTT-7:      PHQ-2 Depression Screening  Little interest or pleasure in doing things? 0-->not at all   Feeling down, depressed, or hopeless? 0-->not at all   PHQ-2 Total Score 0     PHQ-9 Depression Screening  Little interest or pleasure in doing things? 0-->not at all   Feeling down, depressed, or hopeless? 0-->not at all   Trouble falling or staying asleep, or sleeping too much?     Feeling tired or having little energy?     Poor appetite or overeating?     Feeling bad about yourself - or that you are a failure or have let yourself or your family down?     Trouble concentrating on things, such as reading the newspaper or watching television?     Moving or speaking so slowly that other people could have noticed? Or the opposite - being so fidgety or restless that you have been moving around a lot more than usual?     Thoughts that you would be better off dead, or of hurting yourself in some way?     PHQ-9 Total Score 0   If you checked off any problems, how difficult have these problems made it for you to do your work, take care of things at home, or get along with other people?        Result Review  Data Reviewed:            Office Visit with Gail Vegas APRN (05/13/2024)   Comprehensive Metabolic Panel (05/13/2024 09:44)  Hemoglobin A1c (05/13/2024 09:44)  Lipid Panel (05/13/2024 09:44)  TSH (05/13/2024 09:44)  Urinalysis With Culture If Indicated - Urine, Clean Catch (05/13/2024 09:44)  Vitamin D,25-Hydroxy (05/13/2024 09:44)  Urinalysis, Microscopic Only - Urine, Clean Catch (05/13/2024 09:44)  CBC Auto Differential (05/13/2024 09:44)           Assessment and Plan      Diagnoses and all orders for this visit:    1. Essential hypertension (Primary)    2. Mixed hyperlipidemia  -     atorvastatin (Lipitor) 20 MG tablet; Take 1 tablet by mouth Daily.  Dispense: 90 tablet; Refill: 0    3. Overweight (BMI 25.0-29.9)    4. Encounter for  weight loss counseling    5. Mixed anxiety and depressive disorder    6. Right wrist pain  -     XR Hand 3+ View Right  -     XR Wrist 3+ View Right      Patient is seen today following up on hypertension, hyperlipidemia, weight loss, and anxiety/depression.  Blood pressure is well-controlled at 125/79 on lisinopril-HCTZ 20-25 mg daily.  Last visit we increased her Lipitor to 20 mg daily, will recheck lipid panel today.  Her most recent A1c was 6.0 in the prediabetic range, will recheck today as well.  She has been working on diet and exercise changes.  Has been using phentermine and has lost about 14 pounds in the last 3 months.  Feels she is doing well, is very happy with her results.  Would like to continue.  She does not yet need refills at this time as she does not take this medication every single day.  She will let us know when she runs out.  CSA is up-to-date.  Anxiety and depression are well-controlled on Prozac 20 mg daily, she denies HI/SI.    Patient has been experiencing some right wrist/hand pain over the last several weeks, describes the pain at the base of the thumb.  No snuffbox tenderness noted on exam.  Denies any known injury but is questioning carpal tunnel.  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  On exam range of motion is intact, pulse and cap refill intact, sensation intact.  She is questioning arthritis as well.  Will proceed with x-ray and call with results.  Can consider NSAID therapy and bracing.  Will determine further based on x-ray results.    Plan:  1.  X-ray pending  2.  Labs pending  3.  Continue Lipitor 20 mg daily  4.  Continue Prozac 20 mg daily, refills not yet needed  5.  Continue Lisinopril-HCTZ 20-25 mg daily, refills not needed  6.  Continue Phentermine 37.5 mg daily, diet and exercise changes  7.  Follow-up in 3 months        Follow Up   Return in about 3 months (around  11/5/2024).  Patient was given instructions and counseling regarding her condition or for health maintenance advice. Please see specific information pulled into the AVS if appropriate.

## 2024-08-06 ENCOUNTER — TELEPHONE (OUTPATIENT)
Dept: FAMILY MEDICINE CLINIC | Facility: CLINIC | Age: 69
End: 2024-08-06
Payer: MEDICARE

## 2024-08-06 NOTE — TELEPHONE ENCOUNTER
Called patient and confirmed date of birth. Relayed results/recommendations. Pt verbally understood all, no questions at this time. She is open to using Mobic. Counseled to only use PRN.

## 2024-10-24 ENCOUNTER — OFFICE VISIT (OUTPATIENT)
Dept: FAMILY MEDICINE CLINIC | Facility: CLINIC | Age: 69
End: 2024-10-24
Payer: MEDICARE

## 2024-10-24 VITALS
HEART RATE: 89 BPM | RESPIRATION RATE: 20 BRPM | TEMPERATURE: 98.6 F | DIASTOLIC BLOOD PRESSURE: 78 MMHG | SYSTOLIC BLOOD PRESSURE: 124 MMHG | HEIGHT: 66 IN | WEIGHT: 172 LBS | BODY MASS INDEX: 27.64 KG/M2 | OXYGEN SATURATION: 98 %

## 2024-10-24 DIAGNOSIS — E66.3 OVERWEIGHT (BMI 25.0-29.9): ICD-10-CM

## 2024-10-24 DIAGNOSIS — J01.00 ACUTE NON-RECURRENT MAXILLARY SINUSITIS: Primary | ICD-10-CM

## 2024-10-24 DIAGNOSIS — H65.93 FLUID LEVEL BEHIND TYMPANIC MEMBRANE OF BOTH EARS: ICD-10-CM

## 2024-10-24 PROCEDURE — 3074F SYST BP LT 130 MM HG: CPT | Performed by: NURSE PRACTITIONER

## 2024-10-24 PROCEDURE — 99213 OFFICE O/P EST LOW 20 MIN: CPT | Performed by: NURSE PRACTITIONER

## 2024-10-24 PROCEDURE — 3078F DIAST BP <80 MM HG: CPT | Performed by: NURSE PRACTITIONER

## 2024-10-24 PROCEDURE — 1160F RVW MEDS BY RX/DR IN RCRD: CPT | Performed by: NURSE PRACTITIONER

## 2024-10-24 PROCEDURE — 1159F MED LIST DOCD IN RCRD: CPT | Performed by: NURSE PRACTITIONER

## 2024-10-24 NOTE — PROGRESS NOTES
"Chief Complaint  sinus pressure and Nasal Congestion    Subjective    History of Present Illness      Patient presents to [facility] for   History of Present Illness  Pt is here today for sinus pressure,headache,and  nasal congestion x 2-3 days.       Review of Systems   Constitutional: Negative.    HENT:  Positive for dental problem, ear pain and sinus pressure.    Eyes: Negative.    Respiratory: Negative.     Cardiovascular: Negative.    Gastrointestinal: Negative.    Endocrine: Negative.    Genitourinary: Negative.    Musculoskeletal: Negative.    Skin: Negative.    Allergic/Immunologic: Negative.    Neurological: Negative.    Hematological: Negative.    Psychiatric/Behavioral: Negative.         I have reviewed and agree with the HPI and ROS information as above.  Stephania Garcia, APRN     Objective   Vital Signs:   /78   Pulse 89   Temp 98.6 °F (37 °C) (Temporal)   Resp 20   Ht 167.6 cm (66\")   Wt 78 kg (172 lb)   SpO2 98%   BMI 27.76 kg/m²            Physical Exam  Constitutional:       Appearance: Normal appearance. She is well-developed.      Comments: overweight   HENT:      Head: Normocephalic and atraumatic.      Right Ear: External ear normal. A middle ear effusion is present.      Left Ear: External ear normal. A middle ear effusion is present.      Nose: Nose normal. No nasal tenderness or congestion.      Mouth/Throat:      Lips: Pink. No lesions.      Mouth: Mucous membranes are moist. No oral lesions.      Dentition: Abnormal dentition.      Pharynx: Oropharynx is clear. No pharyngeal swelling, oropharyngeal exudate or posterior oropharyngeal erythema.   Eyes:      General: Lids are normal. Vision grossly intact. No scleral icterus.        Right eye: No discharge.         Left eye: No discharge.      Extraocular Movements: Extraocular movements intact.      Conjunctiva/sclera: Conjunctivae normal.      Right eye: Right conjunctiva is not injected.      " Left eye: Left conjunctiva is not injected.      Pupils: Pupils are equal, round, and reactive to light.   Cardiovascular:      Rate and Rhythm: Normal rate and regular rhythm.      Heart sounds: Normal heart sounds. No murmur heard.     No gallop.   Pulmonary:      Effort: Pulmonary effort is normal.      Breath sounds: Normal breath sounds and air entry. No wheezing, rhonchi or rales.   Musculoskeletal:         General: No tenderness or deformity. Normal range of motion.      Cervical back: Full passive range of motion without pain, normal range of motion and neck supple.      Right lower leg: No edema.      Left lower leg: No edema.   Skin:     General: Skin is warm and dry.      Coloration: Skin is not jaundiced.      Findings: No rash.   Neurological:      Mental Status: She is alert and oriented to person, place, and time.      Sensory: Sensation is intact.      Motor: Motor function is intact.      Coordination: Coordination is intact.      Gait: Gait is intact.   Psychiatric:         Attention and Perception: Attention normal.         Mood and Affect: Mood and affect normal.         Behavior: Behavior is not hyperactive. Behavior is cooperative.         Thought Content: Thought content normal.         Judgment: Judgment normal.          SCOTT-7:      PHQ-2 Depression Screening    Little interest or pleasure in doing things? Not at all   Feeling down, depressed, or hopeless? Not at all   PHQ-2 Total Score 0      PHQ-9 Depression Screening  Little interest or pleasure in doing things? Not at all   Feeling down, depressed, or hopeless? Not at all   PHQ-2 Total Score 0   Trouble falling or staying asleep, or sleeping too much?     Feeling tired or having little energy?     Poor appetite or overeating?     Feeling bad about yourself - or that you are a failure or have let yourself or your family down?     Trouble concentrating on things, such as reading the newspaper or watching television?     Moving or speaking  so slowly that other people could have noticed? Or the opposite - being so fidgety or restless that you have been moving around a lot more than usual?     Thoughts that you would be better off dead, or of hurting yourself in some way?     PHQ-9 Total Score     If you checked off any problems, how difficult have these problems made it for you to do your work, take care of things at home, or get along with other people?             Result Review  Data Reviewed:                Assessment and Plan      Diagnoses and all orders for this visit:    1. Acute non-recurrent maxillary sinusitis (Primary)  -     amoxicillin-clavulanate (AUGMENTIN) 875-125 MG per tablet; Take 1 tablet by mouth 2 (Two) Times a Day.  Dispense: 20 tablet; Refill: 0    2. Overweight (BMI 25.0-29.9)    3. Fluid level behind tympanic membrane of both ears      Patient here today with complaints of sinus pressure, ear pain, and dental pain.  Symptoms began 2 to 3 days ago.  She states she has a few teeth that need to be pulled and is not sure if they are infected or if this is related to her sinuses.  She states she has some congestion, but feels this is more like her normal allergy symptoms.  She denies having a sore throat.  She states she does cough, but this is normal for her.  She did take a COVID test at home which was negative.  Fluid noted behind bilateral TMs on exam.  Poor dentition noted, patient has a broken tooth on the bottom left side.    Plan:    1.  Start Augmentin 1 tablet twice a day x 10 days.  2.  Offered steroid pack, patient declines due to side effects.  3.  Recommended to use Flonase nasal spray, patient states she has this at home and is currently using this.  4.  Encourage patient to keep upcoming dental appointments.  5.  Follow-up if symptoms do not improve or become worse.    Follow Up   Return if symptoms worsen or fail to improve.  Patient was given instructions and counseling regarding her condition or for health  maintenance advice. Please see specific information pulled into the AVS if appropriate.

## 2024-12-27 ENCOUNTER — OFFICE VISIT (OUTPATIENT)
Dept: FAMILY MEDICINE CLINIC | Facility: CLINIC | Age: 69
End: 2024-12-27
Payer: MEDICARE

## 2024-12-27 VITALS
RESPIRATION RATE: 20 BRPM | HEART RATE: 86 BPM | BODY MASS INDEX: 27.58 KG/M2 | DIASTOLIC BLOOD PRESSURE: 71 MMHG | TEMPERATURE: 98.6 F | OXYGEN SATURATION: 97 % | SYSTOLIC BLOOD PRESSURE: 106 MMHG | HEIGHT: 66 IN | WEIGHT: 171.6 LBS

## 2024-12-27 DIAGNOSIS — R05.1 ACUTE COUGH: ICD-10-CM

## 2024-12-27 DIAGNOSIS — L29.9 ITCHING OF EAR: ICD-10-CM

## 2024-12-27 DIAGNOSIS — J06.9 UPPER RESPIRATORY TRACT INFECTION, UNSPECIFIED TYPE: Primary | ICD-10-CM

## 2024-12-27 DIAGNOSIS — H61.22 IMPACTED CERUMEN OF LEFT EAR: ICD-10-CM

## 2024-12-27 PROCEDURE — 3078F DIAST BP <80 MM HG: CPT

## 2024-12-27 PROCEDURE — 69209 REMOVE IMPACTED EAR WAX UNI: CPT

## 2024-12-27 PROCEDURE — 3074F SYST BP LT 130 MM HG: CPT

## 2024-12-27 PROCEDURE — 99213 OFFICE O/P EST LOW 20 MIN: CPT

## 2024-12-27 PROCEDURE — 1160F RVW MEDS BY RX/DR IN RCRD: CPT

## 2024-12-27 PROCEDURE — 1159F MED LIST DOCD IN RCRD: CPT

## 2024-12-27 RX ORDER — METHYLPREDNISOLONE 4 MG/1
TABLET ORAL
Qty: 21 TABLET | Refills: 0 | Status: SHIPPED | OUTPATIENT
Start: 2024-12-27

## 2024-12-27 RX ORDER — BROMPHENIRAMINE MALEATE, PSEUDOEPHEDRINE HYDROCHLORIDE, AND DEXTROMETHORPHAN HYDROBROMIDE 2; 30; 10 MG/5ML; MG/5ML; MG/5ML
5 SYRUP ORAL 4 TIMES DAILY PRN
Qty: 473 ML | Refills: 0 | Status: SHIPPED | OUTPATIENT
Start: 2024-12-27

## 2024-12-27 RX ORDER — AZITHROMYCIN 250 MG/1
TABLET, FILM COATED ORAL
Qty: 6 TABLET | Refills: 0 | Status: SHIPPED | OUTPATIENT
Start: 2024-12-27

## 2024-12-27 NOTE — PROGRESS NOTES
"Chief Complaint  Nasal Congestion and Cough    Subjective    History of Present Illness      Patient presents to [facility] for   History of Present Illness  Pt c/o nasal congestion and cough for over a week. Denies n/v/d.       Review of Systems    I have reviewed and agree with the HPI and ROS information as above.  Gail CALDWELL Ghanaian, APRN     Objective   Vital Signs:   /71   Pulse 86   Temp 98.6 °F (37 °C) (Temporal)   Resp 20   Ht 167.6 cm (66\")   Wt 77.8 kg (171 lb 9.6 oz)   SpO2 97%   BMI 27.70 kg/m²          Physical Exam  Vitals and nursing note reviewed.   Constitutional:       General: She is not in acute distress.     Appearance: Normal appearance. She is not toxic-appearing or diaphoretic.   HENT:      Head: Normocephalic and atraumatic.      Right Ear: Tympanic membrane, ear canal and external ear normal.      Left Ear: There is impacted cerumen.      Nose: Congestion present.      Mouth/Throat:      Mouth: Mucous membranes are moist.      Pharynx: Posterior oropharyngeal erythema present. No oropharyngeal exudate.   Eyes:      Extraocular Movements: Extraocular movements intact.      Conjunctiva/sclera: Conjunctivae normal.      Pupils: Pupils are equal, round, and reactive to light.   Cardiovascular:      Rate and Rhythm: Normal rate and regular rhythm.      Pulses: Normal pulses.      Heart sounds: Normal heart sounds.   Pulmonary:      Effort: Pulmonary effort is normal. No respiratory distress.      Breath sounds: Normal breath sounds. No stridor. No wheezing, rhonchi or rales.      Comments: Lung sounds clear throughout, dry cough noted  Skin:     General: Skin is warm and dry.   Neurological:      Mental Status: She is alert and oriented to person, place, and time. Mental status is at baseline.      GCS: GCS eye subscore is 4. GCS verbal subscore is 5. GCS motor subscore is 6.   Psychiatric:         Mood and Affect: Mood normal.         Behavior: " Behavior normal.         Thought Content: Thought content normal.         Judgment: Judgment normal.          SCOTT-7:      PHQ-2 Depression Screening    Little interest or pleasure in doing things?     Feeling down, depressed, or hopeless?     PHQ-2 Total Score        PHQ-9 Depression Screening  Little interest or pleasure in doing things?     Feeling down, depressed, or hopeless?     PHQ-2 Total Score     Trouble falling or staying asleep, or sleeping too much?     Feeling tired or having little energy?     Poor appetite or overeating?     Feeling bad about yourself - or that you are a failure or have let yourself or your family down?     Trouble concentrating on things, such as reading the newspaper or watching television?     Moving or speaking so slowly that other people could have noticed? Or the opposite - being so fidgety or restless that you have been moving around a lot more than usual?     Thoughts that you would be better off dead, or of hurting yourself in some way?     PHQ-9 Total Score     If you checked off any problems, how difficult have these problems made it for you to do your work, take care of things at home, or get along with other people?             Result Review  Data Reviewed:            Ear Cerumen Removal    Date/Time: 12/27/2024 8:39 AM    Performed by: Gail Vegas APRN  Authorized by: Gail Vegas APRN  Location details: left ear  Patient tolerance: patient tolerated the procedure well with no immediate complications  Procedure type: irrigation           Assessment and Plan      Diagnoses and all orders for this visit:    1. Upper respiratory tract infection, unspecified type (Primary)  -     azithromycin (Zithromax Z-Aaron) 250 MG tablet; Take 2 tablets by mouth on day 1, then 1 tablet daily on days 2-5  Dispense: 6 tablet; Refill: 0  -     methylPREDNISolone (MEDROL) 4 MG dose pack; Take as directed on package instructions.  Dispense: 21 tablet; Refill: 0    2. Acute  cough  -     brompheniramine-pseudoephedrine-DM 30-2-10 MG/5ML syrup; Take 5 mL by mouth 4 (Four) Times a Day As Needed for Congestion or Cough.  Dispense: 473 mL; Refill: 0    3. Impacted cerumen of left ear  -     Ear Cerumen Removal    4. Itching of ear      Patient is seen today complaining of sick symptoms that have been going on for about a week.  She reports cough and congestion; denies fever, body aches, or chills.  She is generally well-appearing, nontoxic on exam.  Vital signs are reassuring.  There is obvious nasal congestion noted, mild erythema to the throat with no exudate, right ear is WNL.  Left ear does have a little bit of a cerumen impaction noted lying up against the TM, she admits she has had some itching in this ear.  We will perform lavage in office.  Recommended against using Q-tips.  Lung sounds are clear throughout bilaterally, she does have a mild dry cough noted that is nonproductive.  States that she gets bronchitis every so often throughout the year.  Offered respiratory panel versus treatment alone, she has elected just treatment.  We will proceed with Z-Aaron, Medrol pack, and Bromfed.  Recommend rest, increasing oral fluids, Tylenol/Motrin as needed.  She will follow-up as needed.    Plan:  1.  Start Z-Aaron and Medrol Dosepak as directed  2.  Start Bromfed as needed  3.  Left ear lavage performed in office, no Q-tips  4.  Rest, increase oral fluids, Tylenol/Motrin as needed  5.  Follow-up as needed        Follow Up   Return if symptoms worsen or fail to improve.  Patient was given instructions and counseling regarding her condition or for health maintenance advice. Please see specific information pulled into the AVS if appropriate.

## 2025-04-01 DIAGNOSIS — Z13.820 ENCOUNTER FOR SCREENING FOR OSTEOPOROSIS: ICD-10-CM

## 2025-04-01 DIAGNOSIS — Z12.31 ENCOUNTER FOR SCREENING MAMMOGRAM FOR MALIGNANT NEOPLASM OF BREAST: Primary | ICD-10-CM

## 2025-04-01 DIAGNOSIS — Z78.0 POST-MENOPAUSAL: ICD-10-CM

## 2025-05-08 ENCOUNTER — TELEPHONE (OUTPATIENT)
Dept: FAMILY MEDICINE CLINIC | Facility: CLINIC | Age: 70
End: 2025-05-08
Payer: MEDICARE

## 2025-05-08 NOTE — TELEPHONE ENCOUNTER
Attempted to contact pt via telephone regarding scheduling her medicare annual wellness visit but there was no answer; left voicemail. Sent a [employer] message as well.

## 2025-05-12 ENCOUNTER — APPOINTMENT (OUTPATIENT)
Dept: GENERAL RADIOLOGY | Facility: HOSPITAL | Age: 70
End: 2025-05-12
Payer: MEDICARE

## 2025-05-12 ENCOUNTER — HOSPITAL ENCOUNTER (EMERGENCY)
Facility: HOSPITAL | Age: 70
Discharge: HOME | End: 2025-05-12
Attending: EMERGENCY MEDICINE | Admitting: EMERGENCY MEDICINE
Payer: MEDICARE

## 2025-05-12 VITALS
OXYGEN SATURATION: 95 % | SYSTOLIC BLOOD PRESSURE: 140 MMHG | DIASTOLIC BLOOD PRESSURE: 72 MMHG | WEIGHT: 174.5 LBS | BODY MASS INDEX: 28.04 KG/M2 | HEART RATE: 88 BPM | HEIGHT: 66 IN | RESPIRATION RATE: 19 BRPM | TEMPERATURE: 99.1 F

## 2025-05-12 DIAGNOSIS — R07.9 CHEST PAIN, UNSPECIFIED TYPE: Primary | ICD-10-CM

## 2025-05-12 LAB
ALBUMIN SERPL-MCNC: 4.3 G/DL (ref 3.5–5.2)
ALBUMIN/GLOB SERPL: 1.4 G/DL
ALP SERPL-CCNC: 116 U/L (ref 39–117)
ALT SERPL W P-5'-P-CCNC: 15 U/L (ref 1–33)
ANION GAP SERPL CALCULATED.3IONS-SCNC: 15 MMOL/L (ref 5–15)
AST SERPL-CCNC: 20 U/L (ref 1–32)
BASOPHILS # BLD AUTO: 0.06 10*3/MM3 (ref 0–0.2)
BASOPHILS NFR BLD AUTO: 0.7 % (ref 0–1.5)
BILIRUB SERPL-MCNC: 0.4 MG/DL (ref 0–1.2)
BUN SERPL-MCNC: 10 MG/DL (ref 8–23)
BUN/CREAT SERPL: 13.9 (ref 7–25)
CALCIUM SPEC-SCNC: 9.4 MG/DL (ref 8.6–10.5)
CHLORIDE SERPL-SCNC: 101 MMOL/L (ref 98–107)
CO2 SERPL-SCNC: 22 MMOL/L (ref 22–29)
CREAT SERPL-MCNC: 0.72 MG/DL (ref 0.57–1)
D DIMER PPP FEU-MCNC: 0.68 MCGFEU/ML (ref 0–0.68)
DEPRECATED RDW RBC AUTO: 41.4 FL (ref 37–54)
EGFRCR SERPLBLD CKD-EPI 2021: 91.2 ML/MIN/1.73
EOSINOPHIL # BLD AUTO: 0.3 10*3/MM3 (ref 0–0.4)
EOSINOPHIL NFR BLD AUTO: 3.7 % (ref 0.3–6.2)
ERYTHROCYTE [DISTWIDTH] IN BLOOD BY AUTOMATED COUNT: 13.2 % (ref 12.3–15.4)
GEN 5 1HR TROPONIN T REFLEX: 7 NG/L
GLOBULIN UR ELPH-MCNC: 3 GM/DL
GLUCOSE SERPL-MCNC: 95 MG/DL (ref 65–99)
HCT VFR BLD AUTO: 43.1 % (ref 34–46.6)
HGB BLD-MCNC: 14.3 G/DL (ref 12–15.9)
IMM GRANULOCYTES # BLD AUTO: 0.02 10*3/MM3 (ref 0–0.05)
IMM GRANULOCYTES NFR BLD AUTO: 0.2 % (ref 0–0.5)
LYMPHOCYTES # BLD AUTO: 2.47 10*3/MM3 (ref 0.7–3.1)
LYMPHOCYTES NFR BLD AUTO: 30.1 % (ref 19.6–45.3)
MAGNESIUM SERPL-MCNC: 1.9 MG/DL (ref 1.6–2.4)
MCH RBC QN AUTO: 28.8 PG (ref 26.6–33)
MCHC RBC AUTO-ENTMCNC: 33.2 G/DL (ref 31.5–35.7)
MCV RBC AUTO: 86.9 FL (ref 79–97)
MONOCYTES # BLD AUTO: 0.74 10*3/MM3 (ref 0.1–0.9)
MONOCYTES NFR BLD AUTO: 9 % (ref 5–12)
NEUTROPHILS NFR BLD AUTO: 4.61 10*3/MM3 (ref 1.7–7)
NEUTROPHILS NFR BLD AUTO: 56.3 % (ref 42.7–76)
NRBC BLD AUTO-RTO: 0 /100 WBC (ref 0–0.2)
NT-PROBNP SERPL-MCNC: 68.1 PG/ML (ref 0–900)
PLATELET # BLD AUTO: 313 10*3/MM3 (ref 140–450)
PMV BLD AUTO: 10.8 FL (ref 6–12)
POTASSIUM SERPL-SCNC: 3.9 MMOL/L (ref 3.5–5.2)
PROT SERPL-MCNC: 7.3 G/DL (ref 6–8.5)
RBC # BLD AUTO: 4.96 10*6/MM3 (ref 3.77–5.28)
SODIUM SERPL-SCNC: 138 MMOL/L (ref 136–145)
TROPONIN T NUMERIC DELTA: 0 NG/L
TROPONIN T SERPL HS-MCNC: 7 NG/L
WBC NRBC COR # BLD AUTO: 8.2 10*3/MM3 (ref 3.4–10.8)

## 2025-05-12 PROCEDURE — 93005 ELECTROCARDIOGRAM TRACING: CPT | Performed by: EMERGENCY MEDICINE

## 2025-05-12 PROCEDURE — 85025 COMPLETE CBC W/AUTO DIFF WBC: CPT | Performed by: EMERGENCY MEDICINE

## 2025-05-12 PROCEDURE — 83880 ASSAY OF NATRIURETIC PEPTIDE: CPT | Performed by: EMERGENCY MEDICINE

## 2025-05-12 PROCEDURE — 71045 X-RAY EXAM CHEST 1 VIEW: CPT

## 2025-05-12 PROCEDURE — 83735 ASSAY OF MAGNESIUM: CPT | Performed by: EMERGENCY MEDICINE

## 2025-05-12 PROCEDURE — 99284 EMERGENCY DEPT VISIT MOD MDM: CPT | Performed by: EMERGENCY MEDICINE

## 2025-05-12 PROCEDURE — 85379 FIBRIN DEGRADATION QUANT: CPT | Performed by: EMERGENCY MEDICINE

## 2025-05-12 PROCEDURE — 36415 COLL VENOUS BLD VENIPUNCTURE: CPT

## 2025-05-12 PROCEDURE — 80053 COMPREHEN METABOLIC PANEL: CPT | Performed by: EMERGENCY MEDICINE

## 2025-05-12 PROCEDURE — 84484 ASSAY OF TROPONIN QUANT: CPT | Performed by: EMERGENCY MEDICINE

## 2025-05-12 RX ORDER — OMEPRAZOLE 20 MG/1
20 CAPSULE, DELAYED RELEASE ORAL 2 TIMES DAILY
Qty: 30 CAPSULE | Refills: 0 | Status: SHIPPED | OUTPATIENT
Start: 2025-05-12

## 2025-05-12 RX ORDER — SODIUM CHLORIDE 0.9 % (FLUSH) 0.9 %
10 SYRINGE (ML) INJECTION AS NEEDED
Status: DISCONTINUED | OUTPATIENT
Start: 2025-05-12 | End: 2025-05-12 | Stop reason: HOSPADM

## 2025-05-12 NOTE — ED PROVIDER NOTES
Subjective   History of Present Illness  Patient complains of chest pain and dizziness she describes a lightheaded feeling.  She says the chest pain is her biggest concern.  It seems to hit her and it is a dullness or a fullness in the lower part of her substernal area.  It seems actually made worse whenever she eats.  She does have a history of hiatal hernia but is concerned because her  just recently had bypass surgery and wanted it checked out.  This has been going on for about 2 weeks.  It is not there all the time it comes and goes.  She says she is also noticed a little more shortness of breath and cannot seem to do is much as she used to do.  She gets lightheaded at times.  She does have a cough but she has had that chronically for years.  He denies any diaphoresis or nausea or vomiting with this pain.    History provided by:  Patient   used: No    Chest Pain  Pain location:  Substernal area  Pain quality: dull    Pain radiates to:  Does not radiate  Pain severity:  Mild  Onset quality:  Gradual  Duration:  2 weeks  Timing:  Intermittent  Progression:  Waxing and waning  Chronicity:  New  Context: eating    Context: not breathing, not drug use, not intercourse, not lifting, not movement, not raising an arm, not at rest, not stress and not trauma    Relieved by:  Nothing  Exacerbated by: eating.  Ineffective treatments:  None tried  Associated symptoms: dizziness    Associated symptoms: no abdominal pain, no AICD problem, no altered mental status, no anorexia, no anxiety, no back pain, no claudication, no cough, no diaphoresis, no dysphagia, no fatigue, no fever, no headache, no heartburn, no lower extremity edema, no nausea, no near-syncope, no numbness, no orthopnea, no palpitations, no PND, no shortness of breath, no syncope, no vomiting and no weakness    Risk factors: no aortic disease, no birth control, no coronary artery disease, no diabetes mellitus, no Abiodun-Danlos  syndrome, no high cholesterol, no hypertension, no immobilization, not male, no Marfan's syndrome, not obese, not pregnant, no prior DVT/PE, no smoking and no surgery        Review of Systems   Constitutional:  Negative for diaphoresis, fatigue and fever.   HENT: Negative.  Negative for trouble swallowing.    Respiratory: Negative.  Negative for cough and shortness of breath.    Cardiovascular:  Positive for chest pain. Negative for palpitations, orthopnea, claudication, syncope, PND and near-syncope.   Gastrointestinal: Negative.  Negative for abdominal pain, anorexia, heartburn, nausea and vomiting.   Genitourinary: Negative.    Musculoskeletal: Negative.  Negative for back pain.   Skin: Negative.    Neurological:  Positive for dizziness. Negative for weakness, numbness and headaches.   Psychiatric/Behavioral: Negative.     All other systems reviewed and are negative.      Past Medical History:   Diagnosis Date    Allergies     Anxiety     Depression     Hypertension        No Known Allergies    Past Surgical History:   Procedure Laterality Date     SECTION       SECTION         Family History   Problem Relation Age of Onset    Heart disease Father        Social History     Socioeconomic History    Marital status:    Tobacco Use    Smoking status: Never    Smokeless tobacco: Never   Vaping Use    Vaping status: Never Used   Substance and Sexual Activity    Alcohol use: Never    Drug use: Never    Sexual activity: Defer       Prior to Admission medications    Medication Sig Start Date End Date Taking? Authorizing Provider   atorvastatin (Lipitor) 20 MG tablet Take 1 tablet by mouth Daily. 24   Gail Vegas APRN   azithromycin (Zithromax Z-Aaron) 250 MG tablet Take 2 tablets by mouth on day 1, then 1 tablet daily on days 2-5 24   Gail Vegas APRN   brompheniramine-pseudoephedrine-DM 30-2-10 MG/5ML syrup Take 5 mL by mouth 4 (Four) Times a Day As Needed for  Congestion or Cough. 12/27/24   Gail Vegas APRN   FLUoxetine (PROzac) 20 MG capsule Take 1 capsule by mouth Daily. 5/13/24   Gail Vegas APRN   fluticasone (FLONASE) 50 MCG/ACT nasal spray 2 sprays into the nostril(s) as directed by provider Daily. 2/10/23   Autumn Ruiz APRN   ipratropium-albuterol (DUO-NEB) 0.5-2.5 mg/3 ml nebulizer Take 3 mL by nebulization Every 4 (Four) Hours As Needed for Wheezing for up to 30 days. 3/17/22 10/24/24  Brianna Sims APRN   lisinopril-hydrochlorothiazide (PRINZIDE,ZESTORETIC) 20-25 MG per tablet Take 1 tablet by mouth Every Morning. 5/13/24   Gail Vegas APRN   methylPREDNISolone (MEDROL) 4 MG dose pack Take as directed on package instructions. 12/27/24   Gail Vegas APRN       Medications   sodium chloride 0.9 % flush 10 mL (has no administration in time range)       Vitals:    05/12/25 1146   BP: 120/79   Pulse: 86   Resp:    Temp:    SpO2: 94%         Objective   Physical Exam  Vitals and nursing note reviewed.   Constitutional:       Appearance: She is well-developed.   HENT:      Head: Normocephalic and atraumatic.   Eyes:      Extraocular Movements: Extraocular movements intact.      Pupils: Pupils are equal, round, and reactive to light.   Cardiovascular:      Rate and Rhythm: Normal rate and regular rhythm.   Pulmonary:      Effort: Pulmonary effort is normal.      Breath sounds: Normal breath sounds.   Abdominal:      General: Bowel sounds are normal.      Palpations: Abdomen is soft.   Musculoskeletal:         General: Normal range of motion.      Cervical back: Normal range of motion and neck supple.   Skin:     General: Skin is warm and dry.   Neurological:      General: No focal deficit present.      Mental Status: She is alert and oriented to person, place, and time.   Psychiatric:         Mood and Affect: Mood normal.         Behavior: Behavior normal.         Procedures         Lab Results (last 24 hours)       Procedure  Component Value Units Date/Time    CBC & Differential [[id number]]  (Normal) Collected: 05/12/25 1056    Specimen: Blood Updated: 05/12/25 1111    Narrative:      The following orders were created for panel order CBC & Differential.  Procedure                               Abnormality         Status                     ---------                               -----------         ------                     CBC Auto Differential[[id number]]        Normal              Final result                 Please view results for these tests on the individual orders.    Comprehensive Metabolic Panel [[id number]] Collected: 05/12/25 1056    Specimen: Blood Updated: 05/12/25 1137     Glucose 95 mg/dL      BUN 10 mg/dL      Creatinine 0.72 mg/dL      Sodium 138 mmol/L      Potassium 3.9 mmol/L      Chloride 101 mmol/L      CO2 22.0 mmol/L      Calcium 9.4 mg/dL      Total Protein 7.3 g/dL      Albumin 4.3 g/dL      ALT (SGPT) 15 U/L      AST (SGOT) 20 U/L      Alkaline Phosphatase 116 U/L      Total Bilirubin 0.4 mg/dL      Globulin 3.0 gm/dL      A/G Ratio 1.4 g/dL      BUN/Creatinine Ratio 13.9     Anion Gap 15.0 mmol/L      eGFR 91.2 mL/min/1.73     Narrative:      GFR Categories in Chronic Kidney Disease (CKD)              GFR Category          GFR (mL/min/1.73)    Interpretation  G1                    90 or greater        Normal or high (1)  G2                    60-89                Mild decrease (1)  G3a                   45-59                Mild to moderate decrease  G3b                   30-44                Moderate to severe decrease  G4                    15-29                Severe decrease  G5                    14 or less           Kidney failure    (1)In the absence of evidence of kidney disease, neither GFR category G1 or G2 fulfill the criteria for CKD.    eGFR calculation 2021 CKD-EPI creatinine equation, which does not include race as a factor    D-dimer, Quantitative [[id number]]  (Normal) Collected: 05/12/25 1056  "   Specimen: Blood Updated: 05/12/25 1123     D-Dimer, Quantitative 0.68 MCGFEU/mL     Narrative:      According to the assay 's published package insert, a normal (<0.50 MCGFEU/mL) D-dimer result in conjunction with a non-high clinical probability assessment, excludes deep vein thrombosis (DVT) and pulmonary embolism (PE) with high sensitivity.    D-dimer values increase with age and this can make VTE exclusion of an older population difficult. To address this, the [employer], based on best available evidence and recent guidelines, recommends that clinicians use age-adjusted D-dimer thresholds in patients greater than 50 years of age with: a) a low probability of PE who do not meet all Pulmonary Embolism Rule Out Criteria, or b) in those with intermediate probability of PE.   The formula for an age-adjusted D-dimer cut-off is \"age/100\".  For example, a 60 year old patient would have an age-adjusted cut-off of 0.60 MCGFEU/mL and an 80 year old 0.80 MCGFEU/mL.    High Sensitivity Troponin T [[id number]]  (Normal) Collected: 05/12/25 1056    Specimen: Blood Updated: 05/12/25 1131     HS Troponin T 7 ng/L     Narrative:      High Sensitive Troponin T Reference Range:  <14.0 ng/L- Negative Female for AMI  <22.0 ng/L- Negative Male for AMI  >=14 - Abnormal Female indicating possible myocardial injury.  >=22 - Abnormal Male indicating possible myocardial injury.   Clinicians would have to utilize clinical acumen, EKG, Troponin, and serial changes to determine if it is an Acute Myocardial Infarction or myocardial injury due to an underlying chronic condition.         BNP [[id number]]  (Normal) Collected: 05/12/25 1056    Specimen: Blood Updated: 05/12/25 1127     proBNP 68.1 pg/mL     Narrative:      This assay is used as an aid in the diagnosis of individuals suspected of having heart failure. It can be used as an aid in the diagnosis of acute decompensated heart failure (ADHF) in patients " presenting with signs and symptoms of ADHF to the emergency department (ED). In addition, NT-proBNP of <300 pg/mL indicates ADHF is not likely.    Age Range Result Interpretation  NT-proBNP Concentration (pg/mL:      <50             Positive            >450                   Gray                 300-450                    Negative             <300    50-75           Positive            >900                  Gray                300-900                  Negative            <300      >75             Positive            >1800                  Gray                300-1800                  Negative            <300    Magnesium [[id number]]  (Normal) Collected: 05/12/25 1056    Specimen: Blood Updated: 05/12/25 1137     Magnesium 1.9 mg/dL     CBC Auto Differential [[id number]]  (Normal) Collected: 05/12/25 1056    Specimen: Blood Updated: 05/12/25 1111     WBC 8.20 10*3/mm3      RBC 4.96 10*6/mm3      Hemoglobin 14.3 g/dL      Hematocrit 43.1 %      MCV 86.9 fL      MCH 28.8 pg      MCHC 33.2 g/dL      RDW 13.2 %      RDW-SD 41.4 fl      MPV 10.8 fL      Platelets 313 10*3/mm3      Neutrophil % 56.3 %      Lymphocyte % 30.1 %      Monocyte % 9.0 %      Eosinophil % 3.7 %      Basophil % 0.7 %      Immature Grans % 0.2 %      Neutrophils, Absolute 4.61 10*3/mm3      Lymphocytes, Absolute 2.47 10*3/mm3      Monocytes, Absolute 0.74 10*3/mm3      Eosinophils, Absolute 0.30 10*3/mm3      Basophils, Absolute 0.06 10*3/mm3      Immature Grans, Absolute 0.02 10*3/mm3      nRBC 0.0 /100 WBC     High Sensitivity Troponin T 1Hr [[id number]]  (Normal) Collected: 05/12/25 1159    Specimen: Blood Updated: 05/12/25 1225     HS Troponin T 7 ng/L      Troponin T Numeric Delta 0 ng/L     Narrative:      High Sensitive Troponin T Reference Range:  <14.0 ng/L- Negative Female for AMI  <22.0 ng/L- Negative Male for AMI  >=14 - Abnormal Female indicating possible myocardial injury.  >=22 - Abnormal Male indicating possible myocardial injury.    Clinicians would have to utilize clinical acumen, EKG, Troponin, and serial changes to determine if it is an Acute Myocardial Infarction or myocardial injury due to an underlying chronic condition.                 XR Chest 1 View   Final Result   1. No acute disease.       This report was signed and finalized on 5/12/2025 11:35 AM by Dr. Preet Fuentes MD.              ED Course          MDM  Number of Diagnoses or Management Options  Chest pain, unspecified type: new and requires workup  Diagnosis management comments: I told the patient her cardiac testing was all negative.  There is no signs of exertional or anginal chest pain and the history given to me.  I think is more likely to be related to her hiatal hernia but I cannot be certain.  I did offer to do a stress test if she wanted to get one but she does not want to do that.  She is on nothing for her stomach so I will put her on some Prilosec to see if that will help.  She can follow-up with her family doctor if it continues to bother her.  She is discharged in stable condition.       Amount and/or Complexity of Data Reviewed  Clinical lab tests: ordered and reviewed  Tests in the radiology section of CPT®: ordered and reviewed  Tests in the medicine section of CPT®: ordered and reviewed    Risk of Complications, Morbidity, and/or Mortality  Presenting problems: moderate  Diagnostic procedures: moderate  Management options: moderate    Patient Progress  Patient progress: stable        Final diagnoses:   Chest pain, unspecified type          Nacho Cates Jr., MD  05/12/25 2492

## 2025-05-13 LAB
QT INTERVAL: 444 MS
QTC INTERVAL: 461 MS

## 2025-05-14 ENCOUNTER — OFFICE VISIT (OUTPATIENT)
Dept: FAMILY MEDICINE CLINIC | Facility: CLINIC | Age: 70
End: 2025-05-14
Payer: MEDICARE

## 2025-05-14 VITALS
BODY MASS INDEX: 27.64 KG/M2 | HEIGHT: 66 IN | RESPIRATION RATE: 20 BRPM | OXYGEN SATURATION: 98 % | HEART RATE: 57 BPM | TEMPERATURE: 97.3 F | WEIGHT: 172 LBS | DIASTOLIC BLOOD PRESSURE: 78 MMHG | SYSTOLIC BLOOD PRESSURE: 138 MMHG

## 2025-05-14 DIAGNOSIS — R07.9 CHEST PAIN, UNSPECIFIED TYPE: ICD-10-CM

## 2025-05-14 DIAGNOSIS — R11.0 NAUSEA: ICD-10-CM

## 2025-05-14 DIAGNOSIS — R42 DIZZINESS: Primary | ICD-10-CM

## 2025-05-14 DIAGNOSIS — R06.09 DYSPNEA ON EXERTION: ICD-10-CM

## 2025-05-14 DIAGNOSIS — Z77.22 SECONDHAND SMOKE EXPOSURE: ICD-10-CM

## 2025-05-14 DIAGNOSIS — D64.89 ANEMIA DUE TO OTHER CAUSE, NOT CLASSIFIED: ICD-10-CM

## 2025-05-14 DIAGNOSIS — R19.7 DIARRHEA, UNSPECIFIED TYPE: ICD-10-CM

## 2025-05-14 DIAGNOSIS — R05.3 PERSISTENT DRY COUGH: ICD-10-CM

## 2025-05-14 RX ORDER — ONDANSETRON 4 MG/1
4 TABLET, ORALLY DISINTEGRATING ORAL EVERY 8 HOURS PRN
Qty: 40 TABLET | Refills: 0 | Status: SHIPPED | OUTPATIENT
Start: 2025-05-14

## 2025-05-14 RX ORDER — ALBUTEROL SULFATE 90 UG/1
2 INHALANT RESPIRATORY (INHALATION) EVERY 4 HOURS PRN
Qty: 18 G | Refills: 1 | Status: SHIPPED | OUTPATIENT
Start: 2025-05-14

## 2025-05-14 RX ORDER — CETIRIZINE HYDROCHLORIDE 10 MG/1
10 TABLET ORAL DAILY
Qty: 90 TABLET | Refills: 1 | Status: SHIPPED | OUTPATIENT
Start: 2025-05-14

## 2025-05-14 NOTE — PROGRESS NOTES
"Chief Complaint  Chest Pain, ER Follow Up, Dizziness, and Shortness of Breath    Subjective    History of Present Illness      Patient presents to [facility] for   History of Present Illness  Patient here for follow up from [facility] ER on 05/12/2025.  Patient presented for chest pain and shortness of breath, states all labs and EKG were normal.  Patient still c/o shortness of breath and chest pain on exertion, lightheaded and dizziness.   Chest Pain   Associated symptoms include a cough, dizziness and shortness of breath.      Review of Systems   Respiratory:  Positive for cough and shortness of breath.    Cardiovascular:  Positive for chest pain.   Gastrointestinal:  Positive for diarrhea.   Neurological:  Positive for dizziness.     I have reviewed and agree with the HPI and ROS information as above.  Gail Vegas, APRN     Objective   Vital Signs:   /78 (BP Location: Right arm, Patient Position: Sitting, Cuff Size: Adult)   Pulse 57   Temp 97.3 °F (36.3 °C) (Temporal)   Resp 20   Ht 167.6 cm (66\")   Wt 78 kg (172 lb)   SpO2 98%   BMI 27.76 kg/m²        Physical Exam  Vitals and nursing note reviewed.   Constitutional:       General: She is not in acute distress.     Appearance: Normal appearance. She is overweight. She is not ill-appearing, toxic-appearing or diaphoretic.   HENT:      Head: Normocephalic and atraumatic.      Right Ear: External ear normal.      Left Ear: External ear normal.      Nose: Nose normal.      Mouth/Throat:      Mouth: Mucous membranes are moist.   Eyes:      Conjunctiva/sclera: Conjunctivae normal.   Cardiovascular:      Rate and Rhythm: Normal rate and regular rhythm.      Pulses: Normal pulses.      Heart sounds: Normal heart sounds.   Pulmonary:      Effort: Pulmonary effort is normal. No respiratory distress.      Breath sounds: Normal breath sounds. No stridor. No wheezing, rhonchi or rales.      Comments: Lung sounds clear throughout " bilaterally, dry cough noted  Abdominal:      General: Bowel sounds are normal. There is no distension.      Palpations: Abdomen is soft.      Tenderness: There is no abdominal tenderness. There is no right CVA tenderness, left CVA tenderness, guarding or rebound.   Skin:     General: Skin is warm and dry.   Neurological:      Mental Status: She is alert and oriented to person, place, and time. Mental status is at baseline.      GCS: GCS eye subscore is 4. GCS verbal subscore is 5. GCS motor subscore is 6.   Psychiatric:         Mood and Affect: Mood normal.         Behavior: Behavior normal.         Thought Content: Thought content normal.         Judgment: Judgment normal.        SCOTT-7:      PHQ-2 Depression Screening    Little interest or pleasure in doing things? Not at all   Feeling down, depressed, or hopeless? Not at all   PHQ-2 Total Score 0       PHQ-9 Depression Screening    Little interest or pleasure in doing things? Not at all   Feeling down, depressed, or hopeless? Not at all   PHQ-2 Total Score 0   Trouble falling or staying asleep, or sleeping too much?     Feeling tired or having little energy?     Poor appetite or overeating?     Feeling bad about yourself - or that you are a failure or have let yourself or your family down?     Trouble concentrating on things, such as reading the newspaper or watching television?     Moving or speaking so slowly that other people could have noticed? Or the opposite - being so fidgety or restless that you have been moving around a lot more than usual?     Thoughts that you would be better off dead, or of hurting yourself in some way?     PHQ-9 Total Score     If you checked off any problems, how difficult have these problems made it for you to do your work, take care of things at home, or get along with other people? Not difficult at all       Result Review  Data Reviewed:            ED with Nacho Cates Jr., MD (05/12/2025)   High Sensitivity Troponin T 1Hr  (05/12/2025 11:59)  Magnesium (05/12/2025 10:56)  BNP (05/12/2025 10:56)  High Sensitivity Troponin T (05/12/2025 10:56)  D-dimer, Quantitative (05/12/2025 10:56)  Comprehensive Metabolic Panel (05/12/2025 10:56)  CBC & Differential (05/12/2025 10:56)  XR Chest 1 View (05/12/2025 11:22)   ECG 12 Lead Chest Pain (05/12/2025 10:39)            Assessment and Plan      Problem List Items Addressed This Visit    None  Visit Diagnoses         Dizziness    -  Primary    Relevant Orders    Holter Monitor - 72 Hour Up To 15 Days    Adult Stress Echo W/ Cont or Stress Agent if Necessary Per Protocol      Chest pain, unspecified type        Relevant Orders    Holter Monitor - 72 Hour Up To 15 Days    Adult Stress Echo W/ Cont or Stress Agent if Necessary Per Protocol      Dyspnea on exertion        Relevant Medications    albuterol sulfate  (90 Base) MCG/ACT inhaler    Other Relevant Orders    Holter Monitor - 72 Hour Up To 15 Days    CT Chest Without Contrast    Complete PFT - Pre & Post Bronchodilator    Adult Stress Echo W/ Cont or Stress Agent if Necessary Per Protocol      Persistent dry cough        Relevant Medications    albuterol sulfate  (90 Base) MCG/ACT inhaler    cetirizine (zyrTEC) 10 MG tablet    Other Relevant Orders    CT Chest Without Contrast    Complete PFT - Pre & Post Bronchodilator      Secondhand smoke exposure        Relevant Orders    Complete PFT - Pre & Post Bronchodilator      Diarrhea, unspecified type        Relevant Orders    Gastrointestinal Panel, PCR - Stool, Per Rectum      Nausea        Relevant Medications    ondansetron ODT (ZOFRAN-ODT) 4 MG disintegrating tablet      Anemia due to other cause, not classified        Relevant Orders    Gastrointestinal Panel, PCR - Stool, Per Rectum          Chest pain, dizziness, and dyspnea on exertion.  Patient is following up from an ER visit on 5/12 where she presented for chest pain.  She essentially had a normal lab workup including  serial troponins, normal CXR, and EKG showing chronic right bundle branch block with a normal rate.  She was started on Prilosec which she has not picked up from the pharmacy yet.  She has since been experiencing intermittent episodes of chest pain, dizziness, and dyspnea on exertion.  She states her symptoms are persisting, points to the midsternal region when describing where her chest pain occurs.  She describes it as a bubbling and gurgling pain.  She has been experiencing shortness of breath with activity, specifically when going outside and mowing.  She has had the symptoms before and feels that they are worse in the summer months.  She states that it has gotten to the point where she is not able to do much physical activity.  Recommended a Holter monitor and stress test for further, orders placed.  Will call her with results.  Denies any syncopal episodes, no palpitations.    Persistent dry cough.  Patient has been experiencing a dry cough along with her above symptoms and intermittent hoarse voice at times.  She is a non-smoker but has had a lot of exposure to secondhand smoke over her life.  She reports the cough is pretty much constant.  She denies any fever and no history of asthma.  Symptoms are worse when she is outside, does not take any oral antihistamines for allergies.  Recommended Zyrtec daily and I will send in albuterol HFA for further.  I would like to get a CT of her chest given that her CXR was negative and she is having continued persistent cough along with shortness of breath.  She also experiences some dizziness intermittently.  She is also requesting PFTs.  Will call with results of CT and PFTs when available.  She will let us know if Zyrtec and albuterol HFA do not help her symptoms.    Nausea and diarrhea.  Patient feels she has had a little bit of a stomach bug over the last few days noting nausea, no vomiting, and diarrhea.  Denies any significant abdominal pain and no blood in her  stool.  Will proceed with a GI panel to determine further.  In the meantime recommended rest, increasing oral fluids, and BRAT diet.  Will send Zofran to help with nausea symptoms.    Plan:  GI panel pending; rest, increase oral fluids, BRAT diet start Prilosec as prescribed through ER  CT chest without contrast pending  Holter monitor placed in office today  Stress echo pending  6.  Start Zyrtec 10 mg daily  7.  Start albuterol HFA as needed  8.  PFTs pending  9.  Start Zofran as needed  10.  Follow-up as needed    I spent 45 minutes caring for Michelle on this date of service. This time includes time spent by me in the following activities:preparing for the visit, reviewing tests, performing a medically appropriate examination and/or evaluation , counseling and educating the patient/family/caregiver, ordering medications, tests, or procedures, and documenting information in the medical record    Follow Up   Return if symptoms worsen or fail to improve.  Patient was given instructions and counseling regarding her condition or for health maintenance advice. Please see specific information pulled into the AVS if appropriate.

## 2025-05-16 ENCOUNTER — OFFICE VISIT (OUTPATIENT)
Dept: FAMILY MEDICINE CLINIC | Facility: CLINIC | Age: 70
End: 2025-05-16
Payer: MEDICARE

## 2025-05-16 ENCOUNTER — HOSPITAL ENCOUNTER (OUTPATIENT)
Dept: ULTRASOUND IMAGING | Facility: HOSPITAL | Age: 70
Discharge: HOME | End: 2025-05-16
Payer: MEDICARE

## 2025-05-16 VITALS
TEMPERATURE: 97.7 F | OXYGEN SATURATION: 99 % | SYSTOLIC BLOOD PRESSURE: 140 MMHG | WEIGHT: 171 LBS | HEART RATE: 83 BPM | HEIGHT: 66 IN | DIASTOLIC BLOOD PRESSURE: 80 MMHG | BODY MASS INDEX: 27.48 KG/M2

## 2025-05-16 DIAGNOSIS — R11.0 NAUSEA: ICD-10-CM

## 2025-05-16 DIAGNOSIS — R10.13 EPIGASTRIC PAIN: Primary | ICD-10-CM

## 2025-05-16 DIAGNOSIS — R19.7 DIARRHEA, UNSPECIFIED TYPE: ICD-10-CM

## 2025-05-16 PROCEDURE — 76705 ECHO EXAM OF ABDOMEN: CPT

## 2025-05-16 RX ORDER — PROMETHAZINE HYDROCHLORIDE 25 MG/1
25 TABLET ORAL EVERY 8 HOURS PRN
Qty: 30 TABLET | Refills: 0 | Status: SHIPPED | OUTPATIENT
Start: 2025-05-16

## 2025-05-16 NOTE — PROGRESS NOTES
"Chief Complaint  Nausea and Diarrhea (Started monday)    Subjective    History of Present Illness      Patient presents to [facility] for   History of Present Illness  Patient is here today c/o Nausea and Diarrhea (Started monday)    Review of Systems    I have reviewed and agree with the HPI and ROS information as above.  Gail Vegas, APRN     Objective   Vital Signs:   /80 (BP Location: Left arm, Patient Position: Sitting, Cuff Size: Adult)   Pulse 83   Temp 97.7 °F (36.5 °C) (Infrared)   Ht 167.6 cm (66\")   Wt 77.6 kg (171 lb)   SpO2 99%   BMI 27.60 kg/m²        Physical Exam  Vitals and nursing note reviewed.   Constitutional:       General: She is not in acute distress.     Appearance: Normal appearance. She is not ill-appearing, toxic-appearing or diaphoretic.   HENT:      Head: Normocephalic and atraumatic.      Right Ear: External ear normal.      Left Ear: External ear normal.      Nose: Nose normal.      Mouth/Throat:      Mouth: Mucous membranes are moist.   Eyes:      Conjunctiva/sclera: Conjunctivae normal.   Cardiovascular:      Rate and Rhythm: Normal rate and regular rhythm.      Pulses: Normal pulses.      Heart sounds: Normal heart sounds.   Pulmonary:      Effort: Pulmonary effort is normal.      Breath sounds: Normal breath sounds.   Abdominal:      General: Bowel sounds are normal. There is no distension.      Palpations: Abdomen is soft.      Tenderness: There is abdominal tenderness in the epigastric area. There is no right CVA tenderness, left CVA tenderness, guarding or rebound.   Skin:     General: Skin is warm and dry.   Neurological:      Mental Status: She is alert and oriented to person, place, and time. Mental status is at baseline.      GCS: GCS eye subscore is 4. GCS verbal subscore is 5. GCS motor subscore is 6.   Psychiatric:         Mood and Affect: Mood normal.         Behavior: Behavior normal.         Thought Content: Thought " content normal.         Judgment: Judgment normal.          SCOTT-7:      PHQ-2 Depression Screening    Little interest or pleasure in doing things?     Feeling down, depressed, or hopeless?     PHQ-2 Total Score        PHQ-9 Depression Screening  Little interest or pleasure in doing things?     Feeling down, depressed, or hopeless?     PHQ-2 Total Score     Trouble falling or staying asleep, or sleeping too much?     Feeling tired or having little energy?     Poor appetite or overeating?     Feeling bad about yourself - or that you are a failure or have let yourself or your family down?     Trouble concentrating on things, such as reading the newspaper or watching television?     Moving or speaking so slowly that other people could have noticed? Or the opposite - being so fidgety or restless that you have been moving around a lot more than usual?     Thoughts that you would be better off dead, or of hurting yourself in some way?     PHQ-9 Total Score     If you checked off any problems, how difficult have these problems made it for you to do your work, take care of things at home, or get along with other people?           Result Review  Data Reviewed:                   Assessment and Plan      Diagnoses and all orders for this visit:    1. Epigastric pain (Primary)  -     US Gallbladder  -     Ambulatory Referral to Gastroenterology  -     NM HIDA SCAN WITHOUT PHARMACOLOGICAL INTERVENTION; Future    2. Nausea  -     promethazine (PHENERGAN) 25 MG tablet; Take 1 tablet by mouth Every 8 (Eight) Hours As Needed for Nausea or Vomiting.  Dispense: 30 tablet; Refill: 0  -     Ambulatory Referral to Gastroenterology  -     NM HIDA SCAN WITHOUT PHARMACOLOGICAL INTERVENTION; Future    3. Diarrhea, unspecified type  -     Ambulatory Referral to Gastroenterology      Assessment & Plan    Patient is seen today for continued complaints of epigastric pain, nausea, and diarrhea.  She states she was able to  and start her  Prilosec as well as her Zofran both of which she does not feel is working for her symptoms.  She has not had any episodes of vomiting but is overwhelmingly nauseated.  She states anytime she eats or drinks anything she has a bubbling and gurgling feeling in her epigastric region that makes her feel as though she could vomit.  She is worried that she could be having a gallbladder attack as she does have known gallstones.  We will get a gallbladder ultrasound today to determine further.  She does [place] GI panel [place] that has not been collected which I would like for her to get done in regarding her diarrhea.  We will try some Phenergan instead for nausea to see if this helps, recommended increasing oral fluids, BRAT diet.  She is having a very hard time with her appetite right now and has not been able to drink much fluid because it makes her feel sick.  I have recommended labs, she declines.  Discussed that she might benefit from a GI referral as this could be an esophageal issue.  I will place order once gallbladder US is back and we have a better understanding of the etiology of her symptoms.  Recommended she present to ER over the weekend with new or worsening symptoms.    Gallbladder ultrasound has resulted and reveals normal gallbladder, no biliary dilatation present.  Fatty liver is noted.  Recommended proceeding with HIDA scan as well as GI referral, I have placed orders for both.    Plan:  1.  GI panel remains pending  2.  US gallbladder complete, normal; HIDA scan pending  3.  Start Phenergan 25 mg 3 times daily as needed for nausea  4.  Increase oral fluids, BRAT diet  5.  Continue Prilosec 20 mg twice daily  6.  GI referral placed  7.  Follow-up as needed, to ER over the weekend with new or worsening symptoms        Follow Up   Return if symptoms worsen or fail to improve.  Patient was given instructions and counseling regarding her condition or for health maintenance advice. Please see specific  information pulled into the AVS if appropriate.

## 2025-05-19 ENCOUNTER — OFFICE VISIT (OUTPATIENT)
Dept: FAMILY MEDICINE CLINIC | Facility: CLINIC | Age: 70
End: 2025-05-19
Payer: MEDICARE

## 2025-05-19 ENCOUNTER — LAB (OUTPATIENT)
Dept: LAB | Facility: HOSPITAL | Age: 70
End: 2025-05-19
Payer: MEDICARE

## 2025-05-19 VITALS
HEART RATE: 93 BPM | TEMPERATURE: 97.8 F | SYSTOLIC BLOOD PRESSURE: 152 MMHG | DIASTOLIC BLOOD PRESSURE: 94 MMHG | OXYGEN SATURATION: 100 % | RESPIRATION RATE: 20 BRPM | WEIGHT: 172.4 LBS | BODY MASS INDEX: 27.71 KG/M2 | HEIGHT: 66 IN

## 2025-05-19 DIAGNOSIS — R10.13 EPIGASTRIC ABDOMINAL PAIN: ICD-10-CM

## 2025-05-19 DIAGNOSIS — R42 DIZZINESS: ICD-10-CM

## 2025-05-19 DIAGNOSIS — Z00.00 MEDICARE ANNUAL WELLNESS VISIT, SUBSEQUENT: Primary | ICD-10-CM

## 2025-05-19 DIAGNOSIS — H93.13 RINGING IN EARS, BILATERAL: ICD-10-CM

## 2025-05-19 DIAGNOSIS — E55.9 VITAMIN D DEFICIENCY: ICD-10-CM

## 2025-05-19 DIAGNOSIS — H65.93 FLUID LEVEL BEHIND TYMPANIC MEMBRANE OF BOTH EARS: ICD-10-CM

## 2025-05-19 DIAGNOSIS — Z13.820 SCREENING FOR OSTEOPOROSIS: ICD-10-CM

## 2025-05-19 DIAGNOSIS — Z78.0 POST-MENOPAUSAL: ICD-10-CM

## 2025-05-19 DIAGNOSIS — K76.0 FATTY LIVER: ICD-10-CM

## 2025-05-19 DIAGNOSIS — Z87.19 HISTORY OF HIATAL HERNIA: ICD-10-CM

## 2025-05-19 LAB
25(OH)D3 SERPL-MCNC: 17.3 NG/ML (ref 30–100)
ALBUMIN SERPL-MCNC: 4.6 G/DL (ref 3.5–5)
ALBUMIN/GLOB SERPL: 1.3 G/DL (ref 1.1–2.5)
ALP SERPL-CCNC: 91 U/L (ref 24–120)
ALT SERPL W P-5'-P-CCNC: 17 U/L (ref 0–35)
ANION GAP SERPL CALCULATED.3IONS-SCNC: 9 MMOL/L (ref 4–13)
AST SERPL-CCNC: 21 U/L (ref 7–45)
AUTO MIXED CELLS #: 0.8 10*3/MM3 (ref 0.1–2.6)
AUTO MIXED CELLS %: 9.6 % (ref 0.1–24)
BILIRUB SERPL-MCNC: 0.3 MG/DL (ref 0.1–1)
BILIRUB UR QL STRIP: NEGATIVE
BUN SERPL-MCNC: 15 MG/DL (ref 5–21)
BUN/CREAT SERPL: 16.7
CALCIUM SPEC-SCNC: 9.7 MG/DL (ref 8.6–10.5)
CHLORIDE SERPL-SCNC: 104 MMOL/L (ref 98–110)
CHOLEST SERPL-MCNC: 230 MG/DL (ref 130–200)
CLARITY UR: CLEAR
CO2 SERPL-SCNC: 24 MMOL/L (ref 24–31)
COLOR UR: YELLOW
CREAT SERPL-MCNC: 0.9 MG/DL (ref 0.5–1.4)
EGFRCR SERPLBLD CKD-EPI 2021: 69.8 ML/MIN/1.73
ERYTHROCYTE [DISTWIDTH] IN BLOOD BY AUTOMATED COUNT: 12.9 % (ref 12.3–15.4)
GLOBULIN UR ELPH-MCNC: 3.5 GM/DL
GLUCOSE SERPL-MCNC: 97 MG/DL (ref 65–99)
GLUCOSE UR STRIP-MCNC: NEGATIVE MG/DL
HBA1C MFR BLD: 5.8 % (ref 4.8–5.9)
HCT VFR BLD AUTO: 43.5 % (ref 34–46.6)
HDLC SERPL-MCNC: 49 MG/DL
HGB BLD-MCNC: 14.4 G/DL (ref 12–15.9)
HGB UR QL STRIP.AUTO: NEGATIVE
KETONES UR QL STRIP: NEGATIVE
LDLC SERPL CALC-MCNC: 146 MG/DL (ref 0–99)
LDLC/HDLC SERPL: 2.9 {RATIO}
LEUKOCYTE ESTERASE UR QL STRIP.AUTO: NEGATIVE
LYMPHOCYTES # BLD AUTO: 2.3 10*3/MM3 (ref 0.7–3.1)
LYMPHOCYTES NFR BLD AUTO: 25.9 % (ref 19.6–45.3)
MCH RBC QN AUTO: 29.1 PG (ref 26.6–33)
MCHC RBC AUTO-ENTMCNC: 33.1 G/DL (ref 31.5–35.7)
MCV RBC AUTO: 88.1 FL (ref 79–97)
NEUTROPHILS NFR BLD AUTO: 5.7 10*3/MM3 (ref 1.7–7)
NEUTROPHILS NFR BLD AUTO: 64.5 % (ref 42.7–76)
NITRITE UR QL STRIP: NEGATIVE
PH UR STRIP.AUTO: 6.5 [PH] (ref 5–8)
PLATELET # BLD AUTO: 308 10*3/MM3 (ref 140–450)
PMV BLD AUTO: 10.3 FL (ref 6–12)
POTASSIUM SERPL-SCNC: 3.8 MMOL/L (ref 3.5–5.3)
PROT SERPL-MCNC: 8.1 G/DL (ref 6.3–8.7)
PROT UR QL STRIP: NEGATIVE
RBC # BLD AUTO: 4.94 10*6/MM3 (ref 3.77–5.28)
SODIUM SERPL-SCNC: 137 MMOL/L (ref 135–145)
SP GR UR STRIP: 1.01 (ref 1–1.03)
TRIGL SERPL-MCNC: 194 MG/DL (ref 0–149)
TSH SERPL DL<=0.05 MIU/L-ACNC: 1.97 UIU/ML (ref 0.27–4.2)
UROBILINOGEN UR QL STRIP: NORMAL
VLDLC SERPL-MCNC: 35 MG/DL (ref 5–40)
WBC NRBC COR # BLD AUTO: 8.8 10*3/MM3 (ref 3.4–10.8)

## 2025-05-19 PROCEDURE — 3077F SYST BP >= 140 MM HG: CPT

## 2025-05-19 PROCEDURE — 80061 LIPID PANEL: CPT

## 2025-05-19 PROCEDURE — 3080F DIAST BP >= 90 MM HG: CPT

## 2025-05-19 PROCEDURE — 96372 THER/PROPH/DIAG INJ SC/IM: CPT

## 2025-05-19 PROCEDURE — 80053 COMPREHEN METABOLIC PANEL: CPT

## 2025-05-19 PROCEDURE — 1170F FXNL STATUS ASSESSED: CPT

## 2025-05-19 PROCEDURE — 84443 ASSAY THYROID STIM HORMONE: CPT

## 2025-05-19 PROCEDURE — 85025 COMPLETE CBC W/AUTO DIFF WBC: CPT

## 2025-05-19 PROCEDURE — 1160F RVW MEDS BY RX/DR IN RCRD: CPT

## 2025-05-19 PROCEDURE — 1159F MED LIST DOCD IN RCRD: CPT

## 2025-05-19 PROCEDURE — 1126F AMNT PAIN NOTED NONE PRSNT: CPT

## 2025-05-19 PROCEDURE — G0439 PPPS, SUBSEQ VISIT: HCPCS

## 2025-05-19 PROCEDURE — 83036 HEMOGLOBIN GLYCOSYLATED A1C: CPT

## 2025-05-19 PROCEDURE — 82306 VITAMIN D 25 HYDROXY: CPT

## 2025-05-19 PROCEDURE — 99215 OFFICE O/P EST HI 40 MIN: CPT

## 2025-05-19 PROCEDURE — 36415 COLL VENOUS BLD VENIPUNCTURE: CPT

## 2025-05-19 PROCEDURE — 81003 URINALYSIS AUTO W/O SCOPE: CPT

## 2025-05-19 RX ORDER — DEXAMETHASONE SODIUM PHOSPHATE 4 MG/ML
8 INJECTION, SOLUTION INTRA-ARTICULAR; INTRALESIONAL; INTRAMUSCULAR; INTRAVENOUS; SOFT TISSUE ONCE
Status: COMPLETED | OUTPATIENT
Start: 2025-05-19 | End: 2025-05-19

## 2025-05-19 RX ORDER — MECLIZINE HYDROCHLORIDE 25 MG/1
25 TABLET ORAL 3 TIMES DAILY PRN
Qty: 30 TABLET | Refills: 0 | Status: SHIPPED | OUTPATIENT
Start: 2025-05-19

## 2025-05-19 RX ADMIN — DEXAMETHASONE SODIUM PHOSPHATE 8 MG: 4 INJECTION, SOLUTION INTRA-ARTICULAR; INTRALESIONAL; INTRAMUSCULAR; INTRAVENOUS; SOFT TISSUE at 10:55

## 2025-05-19 NOTE — PROGRESS NOTES
Injection  Injection performed in [facility] by Dari Pelayo RN. Patient tolerated the procedure well without complications.  05/19/25   Dari Pelayo RN

## 2025-05-19 NOTE — PROGRESS NOTES
Subjective   The ABCs of the Annual Wellness Visit  Medicare Wellness Visit    Michelle Berg is a 70 y.o. patient who presents for a Medicare Wellness Visit.    The following portions of the patient's history were reviewed and   updated as appropriate: allergies, current medications, past family history, past medical history, past social history, past surgical history, and problem list.    Compared to one year ago, the patient's physical   health is the same.  Compared to one year ago, the patient's mental   health is the same.    Recent Hospitalizations:  She was not admitted to the hospital during the last year.     Current Medical Providers:  Patient Care Team:  Gail Vegas APRN as PCP - General (Nurse Practitioner)  Aries Malhotra MD as PCP - Family Medicine    Outpatient Medications Prior to Visit   Medication Sig Dispense Refill    albuterol sulfate  (90 Base) MCG/ACT inhaler Inhale 2 puffs Every 4 (Four) Hours As Needed for Wheezing or Shortness of Air. 18 g 1    atorvastatin (Lipitor) 20 MG tablet Take 1 tablet by mouth Daily. 90 tablet 0    cetirizine (zyrTEC) 10 MG tablet Take 1 tablet by mouth Daily. 90 tablet 1    FLUoxetine (PROzac) 20 MG capsule Take 1 capsule by mouth Daily. 90 capsule 1    fluticasone (FLONASE) 50 MCG/ACT nasal spray 2 sprays into the nostril(s) as directed by provider Daily. 11.1 mL 5    lisinopril-hydrochlorothiazide (PRINZIDE,ZESTORETIC) 20-25 MG per tablet Take 1 tablet by mouth Every Morning. 90 tablet 1    omeprazole (priLOSEC) 20 MG capsule Take 1 capsule by mouth 2 (Two) Times a Day. 30 capsule 0    ondansetron ODT (ZOFRAN-ODT) 4 MG disintegrating tablet Place 1 tablet on the tongue Every 8 (Eight) Hours As Needed for Nausea or Vomiting. 40 tablet 0    promethazine (PHENERGAN) 25 MG tablet Take 1 tablet by mouth Every 8 (Eight) Hours As Needed for Nausea or Vomiting. 30 tablet 0    ipratropium-albuterol (DUO-NEB) 0.5-2.5 mg/3 ml nebulizer Take 3 mL by  "nebulization Every 4 (Four) Hours As Needed for Wheezing for up to 30 days. 360 mL 0     No facility-administered medications prior to visit.     No opioid medication identified on active medication list. I have reviewed chart for other potential  high risk medication/s and harmful drug interactions in the elderly.      Aspirin is not on active medication list.  Aspirin use is not indicated based on review of current medical condition/s. Risk of harm outweighs potential benefits.  .    Patient Active Problem List   Diagnosis    Essential hypertension    Mixed anxiety and depressive disorder    Elevated LDL cholesterol level    Class 1 obesity with body mass index (BMI) of 31.0 to 31.9 in adult    Overweight (BMI 25.0-29.9)     Advance Care Planning Advance Directive is not on file.  ACP discussion was declined by the patient. Patient does not have an advance directive, declines further assistance.      Objective   Vitals:    05/19/25 1021   BP: 152/94   BP Location: Right arm   Patient Position: Sitting   Cuff Size: Adult   Pulse: 93   Resp: 20   Temp: 97.8 °F (36.6 °C)   TempSrc: Temporal   SpO2: 100%   Weight: 78.2 kg (172 lb 6.4 oz)   Height: 167.6 cm (66\")   PainSc: 0-No pain       Estimated body mass index is 27.83 kg/m² as calculated from the following:    Height as of this encounter: 167.6 cm (66\").    Weight as of this encounter: 78.2 kg (172 lb 6.4 oz).                Does the patient have evidence of cognitive impairment? No  Lab Results   Component Value Date    TRIG 194 (H) 05/19/2025    HDL 49 (L) 05/19/2025     (H) 05/19/2025    VLDL 35 05/19/2025    HGBA1C 5.8 05/19/2025                                                                                                Health  Risk Assessment    Smoking Status:  Social History     Tobacco Use   Smoking Status Never   Smokeless Tobacco Never     Alcohol Consumption:  Social History     Substance and Sexual Activity   Alcohol Use Never       Fall Risk " Screen  STEADI Fall Risk Assessment was completed, and patient is at LOW risk for falls.Assessment completed on:2025    Depression Screening   Little interest or pleasure in doing things? Not at all   Feeling down, depressed, or hopeless? Not at all   PHQ-2 Total Score 0      Health Habits and Functional and Cognitive Screenin/19/2025    10:17 AM   Functional & Cognitive Status   Do you have difficulty preparing food and eating? No   Do you have difficulty bathing yourself, getting dressed or grooming yourself? No   Do you have difficulty using the toilet? No   Do you have difficulty moving around from place to place? No   Do you have trouble with steps or getting out of a bed or a chair? No   Current Diet Well Balanced Diet   Dental Exam Up to date   Eye Exam Up to date   Exercise (times per week) 5 times per week   Current Exercises Include Walking;Yard Work   Do you need help using the phone?  No   Are you deaf or do you have serious difficulty hearing?  No   Do you need help to go to places out of walking distance? No   Do you need help shopping? No   Do you need help preparing meals?  No   Do you need help with housework?  No   Do you need help with laundry? No   Do you need help taking your medications? No   Do you need help managing money? No   Do you ever drive or ride in a car without wearing a seat belt? No   Have you felt unusual stress, anger or loneliness in the last month? No   Who do you live with? Spouse   If you need help, do you have trouble finding someone available to you? No   Have you been bothered in the last four weeks by sexual problems? No   Do you have difficulty concentrating, remembering or making decisions? No           Age-appropriate Screening Schedule:  Refer to the list below for future screening recommendations based on patient's age, sex and/or medical conditions. Orders for these recommended tests are listed in the plan section. The patient has been provided with a  "written plan.    Health Maintenance List  Health Maintenance   Topic Date Due    DXA SCAN  Never done    TDAP/TD VACCINES (1 - Tdap) Never done    ZOSTER VACCINE (2 of 2) 07/08/2024    COVID-19 Vaccine (3 - 2024-25 season) 09/01/2024    MAMMOGRAM  05/19/2026 (Originally 5/9/1997)    INFLUENZA VACCINE  07/01/2025    ANNUAL WELLNESS VISIT  05/19/2026    LIPID PANEL  05/19/2026    Pneumococcal Vaccine 50+  Completed    HEPATITIS C SCREENING  Discontinued    COLORECTAL CANCER SCREENING  Discontinued                                                                                                                                                CMS Preventative Services Quick Reference  Risk Factors Identified During Encounter  Immunizations Discussed/Encouraged: Shingrix    The above risks/problems have been discussed with the patient.  Pertinent information has been shared with the patient in the After Visit Summary.  An After Visit Summary and PPPS were made available to the patient.    Follow Up:   Next Medicare Wellness visit to be scheduled in 1 year.       Additional E&M Note during same encounter follows:  Patient has additional, significant, and separately identifiable condition(s)/problem(s) that require work above and beyond the Medicare Wellness Visit     Chief Complaint  Medicare Wellness-subsequent, Earache, and Dizziness    Subjective    Patient here for annual Medicare Wellness Visit.      Michelle is also being seen today for additional medical problem/s.            Objective   Vital Signs:  /94 (BP Location: Right arm, Patient Position: Sitting, Cuff Size: Adult)   Pulse 93   Temp 97.8 °F (36.6 °C) (Temporal)   Resp 20   Ht 167.6 cm (66\")   Wt 78.2 kg (172 lb 6.4 oz)   SpO2 100%   BMI 27.83 kg/m²     Physical Exam  Vitals and nursing note reviewed.   Constitutional:       Appearance: Normal appearance. She is not ill-appearing.   HENT:      Head: Normocephalic and atraumatic.      Right Ear: " External ear normal. A middle ear effusion is present.      Left Ear: External ear normal. A middle ear effusion is present.      Nose: Nose normal.      Mouth/Throat:      Mouth: Mucous membranes are moist.      Pharynx: Oropharynx is clear.   Eyes:      Extraocular Movements: Extraocular movements intact.      Conjunctiva/sclera: Conjunctivae normal.      Pupils: Pupils are equal, round, and reactive to light.   Neck:      Thyroid: No thyroid mass or thyroid tenderness.   Cardiovascular:      Rate and Rhythm: Normal rate and regular rhythm.      Pulses: Normal pulses.           Radial pulses are 2+ on the right side and 2+ on the left side.      Heart sounds: Normal heart sounds.   Pulmonary:      Effort: Pulmonary effort is normal.      Breath sounds: Normal breath sounds.   Abdominal:      General: Bowel sounds are normal. There is no distension.      Palpations: Abdomen is soft.      Tenderness: There is no abdominal tenderness.   Musculoskeletal:         General: Normal range of motion.      Cervical back: Normal range of motion.      Right lower leg: No edema.      Left lower leg: No edema.   Skin:     General: Skin is warm and dry.      Capillary Refill: Capillary refill takes less than 2 seconds.   Neurological:      General: No focal deficit present.      Mental Status: She is alert and oriented to person, place, and time. Mental status is at baseline.      GCS: GCS eye subscore is 4. GCS verbal subscore is 5. GCS motor subscore is 6.   Psychiatric:         Mood and Affect: Mood normal.         Behavior: Behavior normal.         Thought Content: Thought content normal.         Judgment: Judgment normal.                     Assessment and Plan         Diagnoses and all orders for this visit:    1. Medicare annual wellness visit, subsequent (Primary)  -     CBC Auto Differential  -     Comprehensive Metabolic Panel  -     Hemoglobin A1c  -     Lipid Panel  -     TSH  -     Urinalysis With Culture If  Indicated - Urine, Clean Catch  -     Vitamin D,25-Hydroxy    2. Fluid level behind tympanic membrane of both ears  -     dexAMETHasone (DECADRON) injection 8 mg  -     meclizine (ANTIVERT) 25 MG tablet; Take 1 tablet by mouth 3 (Three) Times a Day As Needed for Dizziness.  Dispense: 30 tablet; Refill: 0    3. Dizziness  -     meclizine (ANTIVERT) 25 MG tablet; Take 1 tablet by mouth 3 (Three) Times a Day As Needed for Dizziness.  Dispense: 30 tablet; Refill: 0    4. Ringing in ears, bilateral    5. Screening for osteoporosis  -     DEXA Bone Density Axial; Future    6. Post-menopausal  -     DEXA Bone Density Axial; Future    7. Epigastric abdominal pain    8. History of hiatal hernia    9. Fatty liver    10. Vitamin D deficiency  -     Vitamin D,25-Hydroxy       Medicare wellness/health maintenance.   Screening/maintenance labs are due, she will complete and we will call with results. Mammogram and colonoscopy are due, she refuses at this time. Ok with doing her DEXA, order placed. BMI is in the overweight category, recommend diet and exercise changes. She recently had her chronic medication refilled, will follow up in 6 months for recheck. Declines vaccines.     Epigastric pain, nausea, diarrhea.   She reports her diarrhea has resolved, no longer needs GI panel. She is still experiencing nausea and epigastric pain, continues her PPI, has not yet picked up her phenergan. In general she feels her symptoms have improved slightly. CT chest is still pending along with HIDA scan and stress test. Will call with results when available. She does have history of hiatal hernia and fatty liver, was referred to GI for further evaluation of epigastric pain given history and severity of symptoms.     History of lung nodule.   CT chest pending; there was a RLL nodule noted on CT through ER in 2023. Will call with results when available.     Earache and dizziness.   Patient reports bilateral ear fullness, ringing in ears, and  intermittent dizziness due to this. There are bilateral middle ear effusions noted on exam. She is requesting Antivert which has worked for her in the past for these symptoms, will send this and also treat here in the office with a steroid shot.     Plan:  Labs pending  DEXA scan pending  HIDA scan, CT chest, and stress test still pending  Follow up with GI as previously discussed  Decadron 8 mg IM given in office today  Start Antivert 25 mg TID PRN for dizziness  Follow up in 6 months    I spent 45 minutes caring for Michelle on this date of service. This time includes time spent by me in the following activities:preparing for the visit, reviewing tests, performing a medically appropriate examination and/or evaluation , counseling and educating the patient/family/caregiver, ordering medications, tests, or procedures, and documenting information in the medical record  Follow Up   Return in about 6 months (around 11/19/2025).  Patient was given instructions and counseling regarding her condition or for health maintenance advice. Please see specific information pulled into the AVS if appropriate.

## 2025-05-20 ENCOUNTER — TELEPHONE (OUTPATIENT)
Dept: FAMILY MEDICINE CLINIC | Facility: CLINIC | Age: 70
End: 2025-05-20

## 2025-05-20 NOTE — TELEPHONE ENCOUNTER
Caller: Michelle Berg    Relationship: Self    Best call back number: [phone]     Caller requesting test results:  HAS SEEN RESULTS IN MY CHART.  I DID TELL HER THE CLINICIAN NEEDS TO REVIEW.  PATIENT STATED SHE WILL BE OUT AND ABOUT LATER, TO CALL HER TO LET HER KNOW IF WE ARE CALLING IN A PRESCRIPTION FOR ANYTHING.

## 2025-05-30 LAB
CV ZIO BASELINE AVG BPM: 74
CV ZIO BASELINE BPM HIGH: 117
CV ZIO BASELINE BPM LOW: 21

## 2025-06-02 ENCOUNTER — TELEPHONE (OUTPATIENT)
Dept: FAMILY MEDICINE CLINIC | Facility: CLINIC | Age: 70
End: 2025-06-02
Payer: MEDICARE

## 2025-06-02 NOTE — TELEPHONE ENCOUNTER
Pt called office to request Dr Felton for cardiologist. I informed pt our referral specialist was not in the office today but I could place a note in the chart. The provider has already ordered the referral so I am placing a note as a request

## 2025-06-03 ENCOUNTER — HOSPITAL ENCOUNTER (OUTPATIENT)
Dept: BONE DENSITY | Facility: HOSPITAL | Age: 70
Discharge: HOME | End: 2025-06-03
Payer: MEDICARE

## 2025-06-03 ENCOUNTER — HOSPITAL ENCOUNTER (OUTPATIENT)
Dept: NUCLEAR MEDICINE | Facility: HOSPITAL | Age: 70
Discharge: HOME | End: 2025-06-03
Payer: MEDICARE

## 2025-06-03 DIAGNOSIS — R11.0 NAUSEA: ICD-10-CM

## 2025-06-03 DIAGNOSIS — Z78.0 POST-MENOPAUSAL: ICD-10-CM

## 2025-06-03 DIAGNOSIS — Z13.820 SCREENING FOR OSTEOPOROSIS: ICD-10-CM

## 2025-06-03 DIAGNOSIS — R10.13 EPIGASTRIC PAIN: ICD-10-CM

## 2025-06-03 PROCEDURE — A9537 TC99M MEBROFENIN: HCPCS

## 2025-06-03 PROCEDURE — 34310000005 TECHNETIUM TC 99M MEBROFENIN KIT

## 2025-06-03 PROCEDURE — 77080 DXA BONE DENSITY AXIAL: CPT

## 2025-06-03 PROCEDURE — 78226 HEPATOBILIARY SYSTEM IMAGING: CPT

## 2025-06-03 RX ORDER — KIT FOR THE PREPARATION OF TECHNETIUM TC 99M MEBROFENIN 45 MG/10ML
1 INJECTION, POWDER, LYOPHILIZED, FOR SOLUTION INTRAVENOUS
Status: COMPLETED | OUTPATIENT
Start: 2025-06-03 | End: 2025-06-03

## 2025-06-03 RX ADMIN — MEBROFENIN 1 DOSE: 45 INJECTION, POWDER, LYOPHILIZED, FOR SOLUTION INTRAVENOUS at 09:14

## 2025-06-04 ENCOUNTER — TELEPHONE (OUTPATIENT)
Dept: SURGERY | Facility: CLINIC | Age: 70
End: 2025-06-04
Payer: MEDICARE

## 2025-06-04 DIAGNOSIS — H65.90 FLUID COLLECTION OF MIDDLE EAR: ICD-10-CM

## 2025-06-04 DIAGNOSIS — F41.8 MIXED ANXIETY AND DEPRESSIVE DISORDER: ICD-10-CM

## 2025-06-04 DIAGNOSIS — I10 ESSENTIAL HYPERTENSION: ICD-10-CM

## 2025-06-04 NOTE — TELEPHONE ENCOUNTER
Attempted to contact pt to schedule a referral appt we received. I left pt a voicemail with our office phone number to call back and schedule.     [facility]  6/4/25

## 2025-06-05 RX ORDER — LISINOPRIL AND HYDROCHLOROTHIAZIDE 20; 25 MG/1; MG/1
1 TABLET ORAL EVERY MORNING
Qty: 90 TABLET | Refills: 1 | Status: SHIPPED | OUTPATIENT
Start: 2025-06-05

## 2025-06-05 RX ORDER — FLUTICASONE PROPIONATE 50 MCG
2 SPRAY, SUSPENSION (ML) NASAL DAILY
Qty: 9.9 G | Refills: 1 | Status: SHIPPED | OUTPATIENT
Start: 2025-06-05

## 2025-06-06 ENCOUNTER — PATIENT ROUNDING (BHMG ONLY) (OUTPATIENT)
Dept: SURGERY | Facility: CLINIC | Age: 70
End: 2025-06-06
Payer: MEDICARE

## 2025-06-06 ENCOUNTER — OFFICE VISIT (OUTPATIENT)
Dept: SURGERY | Facility: CLINIC | Age: 70
End: 2025-06-06
Payer: MEDICARE

## 2025-06-06 VITALS
SYSTOLIC BLOOD PRESSURE: 155 MMHG | OXYGEN SATURATION: 98 % | HEIGHT: 66 IN | BODY MASS INDEX: 27.64 KG/M2 | DIASTOLIC BLOOD PRESSURE: 90 MMHG | HEART RATE: 99 BPM | WEIGHT: 172 LBS

## 2025-06-06 DIAGNOSIS — K82.8 BILIARY DYSKINESIA: Primary | ICD-10-CM

## 2025-06-06 RX ORDER — SODIUM CHLORIDE 0.9 % (FLUSH) 0.9 %
10 SYRINGE (ML) INJECTION EVERY 12 HOURS SCHEDULED
OUTPATIENT
Start: 2025-06-06

## 2025-06-06 RX ORDER — SODIUM CHLORIDE, SODIUM LACTATE, POTASSIUM CHLORIDE, CALCIUM CHLORIDE 600; 310; 30; 20 MG/100ML; MG/100ML; MG/100ML; MG/100ML
75 INJECTION, SOLUTION INTRAVENOUS CONTINUOUS
OUTPATIENT
Start: 2025-06-06 | End: 2025-06-06

## 2025-06-06 RX ORDER — ONDANSETRON 2 MG/ML
4 INJECTION INTRAMUSCULAR; INTRAVENOUS EVERY 6 HOURS PRN
OUTPATIENT
Start: 2025-06-06

## 2025-06-06 RX ORDER — HEPARIN SODIUM 5000 [USP'U]/ML
5000 INJECTION, SOLUTION INTRAVENOUS; SUBCUTANEOUS ONCE
OUTPATIENT
Start: 2025-06-06 | End: 2025-06-06

## 2025-06-06 RX ORDER — SODIUM CHLORIDE 0.9 % (FLUSH) 0.9 %
10 SYRINGE (ML) INJECTION AS NEEDED
OUTPATIENT
Start: 2025-06-06

## 2025-06-06 RX ORDER — SODIUM CHLORIDE 9 MG/ML
40 INJECTION, SOLUTION INTRAVENOUS AS NEEDED
OUTPATIENT
Start: 2025-06-06

## 2025-06-06 RX ORDER — INDOCYANINE GREEN AND WATER 25 MG
2.5 KIT INJECTION ONCE
OUTPATIENT
Start: 2025-06-06 | End: 2025-06-06

## 2025-06-06 NOTE — PROGRESS NOTES
Patient rounding sent through [employer].     [facility]   6/6/2025

## 2025-06-06 NOTE — LETTER
June 6, 2025     Gail Vegas, JAIME  [address]  [address]  [place] [address]    Patient: Michelle Berg   YOB: 1955   Date of Visit: 6/6/2025     Dear JAIME George:    I have seen Michelle Berg in my office today for evaluation of her abdominal pain and recent HIDA results.  Her history and workup to this point is consistent with a diagnosis of biliary dyskinesia, and I have recommended cholecystectomy in a minimally invasive fashion.  Prior to to surgery, she will need to undergo cardiac evaluation given her recent abnormal Holter results, but pending their evaluation, we will schedule her for the very near future.    Should you have any questions or concerns about her care, please feel free to reach out.  Thank you very much for this referral, and for involving me in Michelle 's care.          Sincerely,        Collin Coelho MD        CC: Collin Russell MD  06/06/25 2034  Sign when Signing Visit    [facility] History and Physical  Michelle Berg  MRN: [id number]  Age: 70 y.o. female     YOB: 1955    Primary   Problem      Biliary dyskinesia    SUBJECTIVE  History  of Presenting Illness     History of Present Illness  The patient presents for evaluation of gallbladder issues.    Patient with onset of severe abdominal pain which radiated to the back and is accompanied by nausea which started approximately 2 weeks ago.  Pain is worse postprandially, does not matter what she eats.  Initially, attributed discomfort to her back or chest, managed with ibuprofen, the pain is worsened and has become bad enough that she presented for medical evaluation.  An ultrasound was performed, which was unrevealing, but subsequent HIDA scan demonstrated an ejection fraction of 6%.  During her medical evaluation, had some arrhythmias, and subsequently was prescribed a Holter monitor.  For follow-up, she is scheduled to  see a cardiologist on 2025. She is not currently on any anticoagulants such as aspirin or Plavix. There is no history of peptic ulcer disease, but she does take antacids intermittently, which provide some relief. She has a history of gallstones and occasional diarrhea. Regurgitation occurs if food is consumed too quickly.  She has a known hiatal hernia which has been present for years, has been medically managed. She has undergone two C-sections but no other abdominal surgeries.    She is currently on lisinopril for hypertension, Prozac for anxiety, a probiotic, and a cholesterol-lowering medication. She was recently diagnosed with vitamin D deficiency, with a level of 17, and was started on supplementation.    PAST SURGICAL HISTORY:  Two C-sections.    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  Diet: Experiences discomfort after eating, especially fatty foods.       Past Medical History     Past Medical History:  Past Medical History:   Diagnosis Date   • Allergies    • Anxiety    • Depression    • Hypertension        PCP: Gail Vegas APRN    Past Surgical History:  Past Surgical History:   Procedure Laterality Date   •  SECTION     •  SECTION         Family History:  Family History   Problem Relation Age of Onset   • Heart disease Father        Social History:  Social History     Tobacco Use   • Smoking status: Never     Passive exposure: Never   • Smokeless tobacco: Never   Substance Use Topics   • Alcohol use: Never       Allergies:  No Known Allergies    Medications:  Current Outpatient Medications   Medication Instructions   • albuterol sulfate  (90 Base) MCG/ACT inhaler 2 puffs, Inhalation, Every 4 Hours PRN   • atorvastatin (LIPITOR) 20 mg, Oral, Daily   • cetirizine (ZYRTEC) 10 mg, Oral, Daily   • FLUoxetine (PROZAC) 20 mg, Oral, Daily   • fluticasone (FLONASE) 50 MCG/ACT nasal spray 2 sprays, Nasal, Daily   • ipratropium-albuterol  "(DUO-NEB) 0.5-2.5 mg/3 ml nebulizer 3 mL, Nebulization, Every 4 Hours PRN   • lisinopril-hydrochlorothiazide (PRINZIDE,ZESTORETIC) 20-25 MG per tablet 1 tablet, Oral, Every Morning   • meclizine (ANTIVERT) 25 mg, Oral, 3 Times Daily PRN   • omeprazole (PRILOSEC) 20 mg, Oral, 2 Times Daily   • ondansetron ODT (ZOFRAN-ODT) 4 mg, Translingual, Every 8 Hours PRN   • promethazine (PHENERGAN) 25 mg, Oral, Every 8 Hours PRN   • vitamin D (ERGOCALCIFEROL) 50,000 Units, Oral, Weekly           Review of Systems   Per HPI.    Physical Examination     Vitals:    06/06/25 0915   BP: 155/90   Pulse: 99   SpO2: 98%   Weight: 78 kg (172 lb)   Height: 167.6 cm (66\")       Estimated body mass index is 27.76 kg/m² as calculated from the following:    Height as of this encounter: 167.6 cm (66\").    Weight as of this encounter: 78 kg (172 lb).  PREVIOUS WEIGHTS:   Wt Readings from Last 5 Encounters:   06/06/25 78 kg (172 lb)   05/19/25 78.2 kg (172 lb 6.4 oz)   05/16/25 77.6 kg (171 lb)   05/14/25 78 kg (172 lb)   05/12/25 79.2 kg (174 lb 8 oz)       Physical Examination:  Gen: awake, alert, sitting up on exam table in no acute distress  HEENT: NCAT, EOMI, anicteric sclerae  CV: RRR, normotensive  Resp: nonlabored on room air, sats appropriate  Abd: soft, nondistended, with diffuse tenderness in the upper quadrants, and no evidence of peritonitis  MSK: moves all four, no c/c/e  Neuro: no focal deficits, cranial nerves grossly intact  Psy:  appropriate, cooperative      Data Review     Labs:  CBC  Lab Results   Component Value Date    WBC 8.80 05/19/2025    HGB 14.4 05/19/2025    HCT 43.5 05/19/2025     05/19/2025      BMP  Lab Results   Component Value Date     05/19/2025    K 3.8 05/19/2025     05/19/2025    CO2 24.0 05/19/2025    BUN 15 05/19/2025    CREATININE 0.90 05/19/2025    GLUCOSE 97 05/19/2025    CALCIUM 9.7 05/19/2025    MG 1.9 05/12/2025      LFTs  Lab Results   Component Value Date    PROTEINTOT 8.1 " 05/19/2025    ALBUMIN 4.6 05/19/2025    BILITOT 0.3 05/19/2025    ALT 17 05/19/2025    AST 21 05/19/2025    ALKPHOS 91 05/19/2025        Cardiac markers  Lab Results   Component Value Date    TROPONINT 7 05/12/2025      Urine:  UA  Lab Results   Component Value Date    COLORU Yellow 05/19/2025    CLARITYU Clear 05/19/2025    SPECGRAVUR 1.010 05/19/2025    PHUR 6.5 05/19/2025    PROTEINUA Negative 05/19/2025    GLUCOSEU Negative 05/19/2025    KETONESU Negative 05/19/2025    BILIRUBINUR Negative 05/19/2025    BLOODU Negative 05/19/2025    NITRITEU Negative 05/19/2025    LEUKOCYTESUR Negative 05/19/2025    UROBILINOGEN 0.2 E.U./dL 05/19/2025    WBCUA 0-2 05/13/2024    RBCUA None Seen 05/13/2024    BACTERIA None Seen 05/13/2024      Radiology Impressions:  NM HIDA SCAN WITHOUT PHARMACOLOGICAL INTERVENTION  Result Date: 6/3/2025  1. Patent cystic duct and common bile duct. No evidence of acute cholecystitis. 2. Decreased gallbladder ejection fraction (6%), correlate for biliary dyskinesia.   This report was signed and finalized on 6/3/2025 11:25 AM by Dr. Zheng Sotomayor MD.      DEXA Bone Density Axial  Result Date: 6/3/2025  1. The osteopenia and increased fracture risk.   This report was signed and finalized on 6/3/2025 8:35 AM by Dr. Adelina Mclean MD.      US Gallbladder  Result Date: 5/16/2025   1. Normal gallbladder. No biliary dilatation present. 2. Hepatic steatosis. No focal hepatic mass or free fluid in perihepatic space.    This report was signed and finalized on 5/16/2025 3:22 PM by Dr. Preet Fuentes MD.      XR Chest 1 View  Result Date: 5/12/2025  1. No acute disease.  This report was signed and finalized on 5/12/2025 11:35 AM by Dr. Preet Fuentes MD.         Problem List     Patient Active Problem List   Diagnosis   • Essential hypertension   • Mixed anxiety and depressive disorder   • Elevated LDL cholesterol level   • Class 1 obesity with body mass index (BMI) of 31.0 to 31.9 in adult   •  Overweight (BMI 25.0-29.9)   • Biliary dyskinesia            Assessment/Plan     Assessment & Plan  1. Biliary dyskinesia.  Symptoms and HIDA scan results indicate biliary dyskinesia with a gallbladder ejection fraction of only 6%. A comprehensive discussion was held regarding the pathophysiology of the gallbladder, potential complications associated with biliary dyskinesia, and the benefits and risks of cholecystectomy. The procedure will be performed using a robotic platform. Advised to adhere to a low-fat diet until the surgery. A cholecystectomy is recommended and scheduled for 07/02/2025, contingent upon cardiology clearance. Postoperative care instructions include avoiding lifting more than 15 pounds for 2 weeks, not showering for 48 hours, and avoiding immersion of the incisions for 2 weeks. Pain medication, nausea medication, and a stool softener will be provided postoperatively. Advised to continue a very low-fat diet for 6 weeks after surgery and then gradually reintroduce fats into the diet.    2. Cardiac evaluation.  Appointment with cardiology on 06/24/2025 due to irregular rhythms noted on a Holter monitor. A form for risk stratification will be sent to cardiology. Surgery will proceed on 07/02/2025 if cardiology deems it safe. If additional cardiac tests are required, surgery may be postponed by a week or two to ensure safety.           Smoking cessation: Never smoker  BMI: 27.8-information regarding appropriate diet and exercise provided in AVS  Med rec complete: yes  VTE: VTE PPX is not indicated.    Time Spent: I spent 45 minutes caring for Michelle Berg on this date of service. This time includes time, independent of any procedures, spent by me in the following activities: preparing for the clinic visit, reviewing tests, obtaining and/or reviewing a separately obtained history, performing a medically appropriate examination and/or evaluation, counseling and educating the  patient/family/caregiver, ordering medications, tests, or procedures, and documenting information in the medical record.     Collin Coelho MD  6/6/2025   20:25 CDT    Patient or patient representative verbalized consent for the use of Ambient Listening during the visit with  Collin Coelho MD for chart documentation. 6/6/2025  20:34 CDT

## 2025-06-07 NOTE — H&P (VIEW-ONLY)
[facility] History and Physical  Michelle Berg  MRN: [id number]  Age: 70 y.o. female     YOB: 1955    Primary   Problem      Biliary dyskinesia    SUBJECTIVE  History  of Presenting Illness     History of Present Illness  The patient presents for evaluation of gallbladder issues.    Patient with onset of severe abdominal pain which radiated to the back and is accompanied by nausea which started approximately 2 weeks ago.  Pain is worse postprandially, does not matter what she eats.  Initially, attributed discomfort to her back or chest, managed with ibuprofen, the pain is worsened and has become bad enough that she presented for medical evaluation.  An ultrasound was performed, which was unrevealing, but subsequent HIDA scan demonstrated an ejection fraction of 6%.  During her medical evaluation, had some arrhythmias, and subsequently was prescribed a Holter monitor.  For follow-up, she is scheduled to see a cardiologist on 06/24/2025. She is not currently on any anticoagulants such as aspirin or Plavix. There is no history of peptic ulcer disease, but she does take antacids intermittently, which provide some relief. She has a history of gallstones and occasional diarrhea. Regurgitation occurs if food is consumed too quickly.  She has a known hiatal hernia which has been present for years, has been medically managed. She has undergone two C-sections but no other abdominal surgeries.    She is currently on lisinopril for hypertension, Prozac for anxiety, a probiotic, and a cholesterol-lowering medication. She was recently diagnosed with vitamin D deficiency, with a level of 17, and was started on supplementation.    PAST SURGICAL HISTORY:  Two C-sections.    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  Diet: Experiences discomfort after eating, especially fatty foods.       Past Medical History     Past Medical History:  Past Medical  "History:   Diagnosis Date    Allergies     Anxiety     Depression     Hypertension        PCP: Gail Vegas APRN    Past Surgical History:  Past Surgical History:   Procedure Laterality Date     SECTION       SECTION         Family History:  Family History   Problem Relation Age of Onset    Heart disease Father        Social History:  Social History     Tobacco Use    Smoking status: Never     Passive exposure: Never    Smokeless tobacco: Never   Substance Use Topics    Alcohol use: Never       Allergies:  No Known Allergies    Medications:  Current Outpatient Medications   Medication Instructions    albuterol sulfate  (90 Base) MCG/ACT inhaler 2 puffs, Inhalation, Every 4 Hours PRN    atorvastatin (LIPITOR) 20 mg, Oral, Daily    cetirizine (ZYRTEC) 10 mg, Oral, Daily    FLUoxetine (PROZAC) 20 mg, Oral, Daily    fluticasone (FLONASE) 50 MCG/ACT nasal spray 2 sprays, Nasal, Daily    ipratropium-albuterol (DUO-NEB) 0.5-2.5 mg/3 ml nebulizer 3 mL, Nebulization, Every 4 Hours PRN    lisinopril-hydrochlorothiazide (PRINZIDE,ZESTORETIC) 20-25 MG per tablet 1 tablet, Oral, Every Morning    meclizine (ANTIVERT) 25 mg, Oral, 3 Times Daily PRN    omeprazole (PRILOSEC) 20 mg, Oral, 2 Times Daily    ondansetron ODT (ZOFRAN-ODT) 4 mg, Translingual, Every 8 Hours PRN    promethazine (PHENERGAN) 25 mg, Oral, Every 8 Hours PRN    vitamin D (ERGOCALCIFEROL) 50,000 Units, Oral, Weekly           Review of Systems   Per HPI.    Physical Examination     Vitals:    25 0915   BP: 155/90   Pulse: 99   SpO2: 98%   Weight: 78 kg (172 lb)   Height: 167.6 cm (66\")       Estimated body mass index is 27.76 kg/m² as calculated from the following:    Height as of this encounter: 167.6 cm (66\").    Weight as of this encounter: 78 kg (172 lb).  PREVIOUS WEIGHTS:   Wt Readings from Last 5 Encounters:   25 78 kg (172 lb)   25 78.2 kg (172 lb 6.4 oz)   25 77.6 kg (171 lb)   25 78 kg " (172 lb)   05/12/25 79.2 kg (174 lb 8 oz)       Physical Examination:  Gen: awake, alert, sitting up on exam table in no acute distress  HEENT: NCAT, EOMI, anicteric sclerae  CV: RRR, normotensive  Resp: nonlabored on room air, sats appropriate  Abd: soft, nondistended, with diffuse tenderness in the upper quadrants, and no evidence of peritonitis  MSK: moves all four, no c/c/e  Neuro: no focal deficits, cranial nerves grossly intact  Psy:  appropriate, cooperative      Data Review     Labs:  CBC  Lab Results   Component Value Date    WBC 8.80 05/19/2025    HGB 14.4 05/19/2025    HCT 43.5 05/19/2025     05/19/2025      BMP  Lab Results   Component Value Date     05/19/2025    K 3.8 05/19/2025     05/19/2025    CO2 24.0 05/19/2025    BUN 15 05/19/2025    CREATININE 0.90 05/19/2025    GLUCOSE 97 05/19/2025    CALCIUM 9.7 05/19/2025    MG 1.9 05/12/2025      LFTs  Lab Results   Component Value Date    PROTEINTOT 8.1 05/19/2025    ALBUMIN 4.6 05/19/2025    BILITOT 0.3 05/19/2025    ALT 17 05/19/2025    AST 21 05/19/2025    ALKPHOS 91 05/19/2025        Cardiac markers  Lab Results   Component Value Date    TROPONINT 7 05/12/2025      Urine:  UA  Lab Results   Component Value Date    COLORU Yellow 05/19/2025    CLARITYU Clear 05/19/2025    SPECGRAVUR 1.010 05/19/2025    PHUR 6.5 05/19/2025    PROTEINUA Negative 05/19/2025    GLUCOSEU Negative 05/19/2025    KETONESU Negative 05/19/2025    BILIRUBINUR Negative 05/19/2025    BLOODU Negative 05/19/2025    NITRITEU Negative 05/19/2025    LEUKOCYTESUR Negative 05/19/2025    UROBILINOGEN 0.2 E.U./dL 05/19/2025    WBCUA 0-2 05/13/2024    RBCUA None Seen 05/13/2024    BACTERIA None Seen 05/13/2024      Radiology Impressions:  NM HIDA SCAN WITHOUT PHARMACOLOGICAL INTERVENTION  Result Date: 6/3/2025  1. Patent cystic duct and common bile duct. No evidence of acute cholecystitis. 2. Decreased gallbladder ejection fraction (6%), correlate for biliary dyskinesia.    This report was signed and finalized on 6/3/2025 11:25 AM by Dr. Zheng Sotomayor MD.      DEXA Bone Density Axial  Result Date: 6/3/2025  1. The osteopenia and increased fracture risk.   This report was signed and finalized on 6/3/2025 8:35 AM by Dr. Adelina Mclean MD.      US Gallbladder  Result Date: 5/16/2025   1. Normal gallbladder. No biliary dilatation present. 2. Hepatic steatosis. No focal hepatic mass or free fluid in perihepatic space.    This report was signed and finalized on 5/16/2025 3:22 PM by Dr. Preet Fuentes MD.      XR Chest 1 View  Result Date: 5/12/2025  1. No acute disease.  This report was signed and finalized on 5/12/2025 11:35 AM by Dr. Preet Fuentes MD.         Problem List     Patient Active Problem List   Diagnosis    Essential hypertension    Mixed anxiety and depressive disorder    Elevated LDL cholesterol level    Class 1 obesity with body mass index (BMI) of 31.0 to 31.9 in adult    Overweight (BMI 25.0-29.9)    Biliary dyskinesia            Assessment/Plan     Assessment & Plan  1. Biliary dyskinesia.  Symptoms and HIDA scan results indicate biliary dyskinesia with a gallbladder ejection fraction of only 6%. A comprehensive discussion was held regarding the pathophysiology of the gallbladder, potential complications associated with biliary dyskinesia, and the benefits and risks of cholecystectomy. The procedure will be performed using a robotic platform. Advised to adhere to a low-fat diet until the surgery. A cholecystectomy is recommended and scheduled for 07/02/2025, contingent upon cardiology clearance. Postoperative care instructions include avoiding lifting more than 15 pounds for 2 weeks, not showering for 48 hours, and avoiding immersion of the incisions for 2 weeks. Pain medication, nausea medication, and a stool softener will be provided postoperatively. Advised to continue a very low-fat diet for 6 weeks after surgery and then gradually reintroduce fats into the  diet.    2. Cardiac evaluation.  Appointment with cardiology on 06/24/2025 due to irregular rhythms noted on a Holter monitor. A form for risk stratification will be sent to cardiology. Surgery will proceed on 07/02/2025 if cardiology deems it safe. If additional cardiac tests are required, surgery may be postponed by a week or two to ensure safety.           Smoking cessation: Never smoker  BMI: 27.8-information regarding appropriate diet and exercise provided in AVS  Med rec complete: yes  VTE: VTE PPX is not indicated.    Time Spent: I spent 45 minutes caring for Michelle Berg on this date of service. This time includes time, independent of any procedures, spent by me in the following activities: preparing for the clinic visit, reviewing tests, obtaining and/or reviewing a separately obtained history, performing a medically appropriate examination and/or evaluation, counseling and educating the patient/family/caregiver, ordering medications, tests, or procedures, and documenting information in the medical record.     Collin Coelho MD  6/6/2025   20:25 CDT    Patient or patient representative verbalized consent for the use of Ambient Listening during the visit with  Collin Coelho MD for chart documentation. 6/6/2025  20:34 CDT

## 2025-06-07 NOTE — PROGRESS NOTES
[facility] History and Physical  Michelle Berg  MRN: [id number]  Age: 70 y.o. female     YOB: 1955    Primary   Problem      Biliary dyskinesia    SUBJECTIVE  History  of Presenting Illness     History of Present Illness  The patient presents for evaluation of gallbladder issues.    Patient with onset of severe abdominal pain which radiated to the back and is accompanied by nausea which started approximately 2 weeks ago.  Pain is worse postprandially, does not matter what she eats.  Initially, attributed discomfort to her back or chest, managed with ibuprofen, the pain is worsened and has become bad enough that she presented for medical evaluation.  An ultrasound was performed, which was unrevealing, but subsequent HIDA scan demonstrated an ejection fraction of 6%.  During her medical evaluation, had some arrhythmias, and subsequently was prescribed a Holter monitor.  For follow-up, she is scheduled to see a cardiologist on 06/24/2025. She is not currently on any anticoagulants such as aspirin or Plavix. There is no history of peptic ulcer disease, but she does take antacids intermittently, which provide some relief. She has a history of gallstones and occasional diarrhea. Regurgitation occurs if food is consumed too quickly.  She has a known hiatal hernia which has been present for years, has been medically managed. She has undergone two C-sections but no other abdominal surgeries.    She is currently on lisinopril for hypertension, Prozac for anxiety, a probiotic, and a cholesterol-lowering medication. She was recently diagnosed with vitamin D deficiency, with a level of 17, and was started on supplementation.    PAST SURGICAL HISTORY:  Two C-sections.    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  Diet: Experiences discomfort after eating, especially fatty foods.       Past Medical History     Past Medical History:  Past Medical  "History:   Diagnosis Date    Allergies     Anxiety     Depression     Hypertension        PCP: Gail Vegas APRN    Past Surgical History:  Past Surgical History:   Procedure Laterality Date     SECTION       SECTION         Family History:  Family History   Problem Relation Age of Onset    Heart disease Father        Social History:  Social History     Tobacco Use    Smoking status: Never     Passive exposure: Never    Smokeless tobacco: Never   Substance Use Topics    Alcohol use: Never       Allergies:  No Known Allergies    Medications:  Current Outpatient Medications   Medication Instructions    albuterol sulfate  (90 Base) MCG/ACT inhaler 2 puffs, Inhalation, Every 4 Hours PRN    atorvastatin (LIPITOR) 20 mg, Oral, Daily    cetirizine (ZYRTEC) 10 mg, Oral, Daily    FLUoxetine (PROZAC) 20 mg, Oral, Daily    fluticasone (FLONASE) 50 MCG/ACT nasal spray 2 sprays, Nasal, Daily    ipratropium-albuterol (DUO-NEB) 0.5-2.5 mg/3 ml nebulizer 3 mL, Nebulization, Every 4 Hours PRN    lisinopril-hydrochlorothiazide (PRINZIDE,ZESTORETIC) 20-25 MG per tablet 1 tablet, Oral, Every Morning    meclizine (ANTIVERT) 25 mg, Oral, 3 Times Daily PRN    omeprazole (PRILOSEC) 20 mg, Oral, 2 Times Daily    ondansetron ODT (ZOFRAN-ODT) 4 mg, Translingual, Every 8 Hours PRN    promethazine (PHENERGAN) 25 mg, Oral, Every 8 Hours PRN    vitamin D (ERGOCALCIFEROL) 50,000 Units, Oral, Weekly           Review of Systems   Per HPI.    Physical Examination     Vitals:    25 0915   BP: 155/90   Pulse: 99   SpO2: 98%   Weight: 78 kg (172 lb)   Height: 167.6 cm (66\")       Estimated body mass index is 27.76 kg/m² as calculated from the following:    Height as of this encounter: 167.6 cm (66\").    Weight as of this encounter: 78 kg (172 lb).  PREVIOUS WEIGHTS:   Wt Readings from Last 5 Encounters:   25 78 kg (172 lb)   25 78.2 kg (172 lb 6.4 oz)   25 77.6 kg (171 lb)   25 78 kg " (172 lb)   05/12/25 79.2 kg (174 lb 8 oz)       Physical Examination:  Gen: awake, alert, sitting up on exam table in no acute distress  HEENT: NCAT, EOMI, anicteric sclerae  CV: RRR, normotensive  Resp: nonlabored on room air, sats appropriate  Abd: soft, nondistended, with diffuse tenderness in the upper quadrants, and no evidence of peritonitis  MSK: moves all four, no c/c/e  Neuro: no focal deficits, cranial nerves grossly intact  Psy:  appropriate, cooperative      Data Review     Labs:  CBC  Lab Results   Component Value Date    WBC 8.80 05/19/2025    HGB 14.4 05/19/2025    HCT 43.5 05/19/2025     05/19/2025      BMP  Lab Results   Component Value Date     05/19/2025    K 3.8 05/19/2025     05/19/2025    CO2 24.0 05/19/2025    BUN 15 05/19/2025    CREATININE 0.90 05/19/2025    GLUCOSE 97 05/19/2025    CALCIUM 9.7 05/19/2025    MG 1.9 05/12/2025      LFTs  Lab Results   Component Value Date    PROTEINTOT 8.1 05/19/2025    ALBUMIN 4.6 05/19/2025    BILITOT 0.3 05/19/2025    ALT 17 05/19/2025    AST 21 05/19/2025    ALKPHOS 91 05/19/2025        Cardiac markers  Lab Results   Component Value Date    TROPONINT 7 05/12/2025      Urine:  UA  Lab Results   Component Value Date    COLORU Yellow 05/19/2025    CLARITYU Clear 05/19/2025    SPECGRAVUR 1.010 05/19/2025    PHUR 6.5 05/19/2025    PROTEINUA Negative 05/19/2025    GLUCOSEU Negative 05/19/2025    KETONESU Negative 05/19/2025    BILIRUBINUR Negative 05/19/2025    BLOODU Negative 05/19/2025    NITRITEU Negative 05/19/2025    LEUKOCYTESUR Negative 05/19/2025    UROBILINOGEN 0.2 E.U./dL 05/19/2025    WBCUA 0-2 05/13/2024    RBCUA None Seen 05/13/2024    BACTERIA None Seen 05/13/2024      Radiology Impressions:  NM HIDA SCAN WITHOUT PHARMACOLOGICAL INTERVENTION  Result Date: 6/3/2025  1. Patent cystic duct and common bile duct. No evidence of acute cholecystitis. 2. Decreased gallbladder ejection fraction (6%), correlate for biliary dyskinesia.    This report was signed and finalized on 6/3/2025 11:25 AM by Dr. Zheng Sotomayor MD.      DEXA Bone Density Axial  Result Date: 6/3/2025  1. The osteopenia and increased fracture risk.   This report was signed and finalized on 6/3/2025 8:35 AM by Dr. Adelina Mclean MD.      US Gallbladder  Result Date: 5/16/2025   1. Normal gallbladder. No biliary dilatation present. 2. Hepatic steatosis. No focal hepatic mass or free fluid in perihepatic space.    This report was signed and finalized on 5/16/2025 3:22 PM by Dr. Preet Fuentes MD.      XR Chest 1 View  Result Date: 5/12/2025  1. No acute disease.  This report was signed and finalized on 5/12/2025 11:35 AM by Dr. Preet Fuentes MD.         Problem List     Patient Active Problem List   Diagnosis    Essential hypertension    Mixed anxiety and depressive disorder    Elevated LDL cholesterol level    Class 1 obesity with body mass index (BMI) of 31.0 to 31.9 in adult    Overweight (BMI 25.0-29.9)    Biliary dyskinesia            Assessment/Plan     Assessment & Plan  1. Biliary dyskinesia.  Symptoms and HIDA scan results indicate biliary dyskinesia with a gallbladder ejection fraction of only 6%. A comprehensive discussion was held regarding the pathophysiology of the gallbladder, potential complications associated with biliary dyskinesia, and the benefits and risks of cholecystectomy. The procedure will be performed using a robotic platform. Advised to adhere to a low-fat diet until the surgery. A cholecystectomy is recommended and scheduled for 07/02/2025, contingent upon cardiology clearance. Postoperative care instructions include avoiding lifting more than 15 pounds for 2 weeks, not showering for 48 hours, and avoiding immersion of the incisions for 2 weeks. Pain medication, nausea medication, and a stool softener will be provided postoperatively. Advised to continue a very low-fat diet for 6 weeks after surgery and then gradually reintroduce fats into the  diet.    2. Cardiac evaluation.  Appointment with cardiology on 06/24/2025 due to irregular rhythms noted on a Holter monitor. A form for risk stratification will be sent to cardiology. Surgery will proceed on 07/02/2025 if cardiology deems it safe. If additional cardiac tests are required, surgery may be postponed by a week or two to ensure safety.           Smoking cessation: Never smoker  BMI: 27.8-information regarding appropriate diet and exercise provided in AVS  Med rec complete: yes  VTE: VTE PPX is not indicated.    Time Spent: I spent 45 minutes caring for Michelle Berg on this date of service. This time includes time, independent of any procedures, spent by me in the following activities: preparing for the clinic visit, reviewing tests, obtaining and/or reviewing a separately obtained history, performing a medically appropriate examination and/or evaluation, counseling and educating the patient/family/caregiver, ordering medications, tests, or procedures, and documenting information in the medical record.     Collin Coelho MD  6/6/2025   20:25 CDT    Patient or patient representative verbalized consent for the use of Ambient Listening during the visit with  Collin Coelho MD for chart documentation. 6/6/2025  20:34 CDT

## 2025-06-12 ENCOUNTER — TELEPHONE (OUTPATIENT)
Dept: FAMILY MEDICINE CLINIC | Facility: CLINIC | Age: 70
End: 2025-06-12
Payer: MEDICARE

## 2025-06-12 NOTE — TELEPHONE ENCOUNTER
Pt is in significant pain while waiting for choly to be performed 7/2. She is requesting pain meds. Please advise.

## 2025-06-13 ENCOUNTER — HOSPITAL ENCOUNTER (INPATIENT)
Facility: HOSPITAL | Age: 70
LOS: 4 days | Discharge: HOME | End: 2025-06-17
Attending: FAMILY MEDICINE | Admitting: INTERNAL MEDICINE
Payer: MEDICARE

## 2025-06-13 ENCOUNTER — TELEPHONE (OUTPATIENT)
Dept: CARDIOLOGY | Facility: CLINIC | Age: 70
End: 2025-06-13
Payer: MEDICARE

## 2025-06-13 ENCOUNTER — APPOINTMENT (OUTPATIENT)
Dept: CT IMAGING | Facility: HOSPITAL | Age: 70
End: 2025-06-13
Payer: MEDICARE

## 2025-06-13 ENCOUNTER — APPOINTMENT (OUTPATIENT)
Dept: GENERAL RADIOLOGY | Facility: HOSPITAL | Age: 70
End: 2025-06-13
Payer: MEDICARE

## 2025-06-13 DIAGNOSIS — I44.2 COMPLETE HEART BLOCK: Primary | ICD-10-CM

## 2025-06-13 DIAGNOSIS — Z00.6 ENCOUNTER FOR EXAMINATION FOR NORMAL COMPARISON AND CONTROL IN CLINICAL RESEARCH PROGRAM: ICD-10-CM

## 2025-06-13 LAB
ALBUMIN SERPL-MCNC: 4 G/DL (ref 3.5–5.2)
ALBUMIN/GLOB SERPL: 1.3 G/DL
ALP SERPL-CCNC: 108 U/L (ref 39–117)
ALT SERPL W P-5'-P-CCNC: 14 U/L (ref 1–33)
ANION GAP SERPL CALCULATED.3IONS-SCNC: 13 MMOL/L (ref 5–15)
APTT PPP: 28.9 SECONDS (ref 24.5–36)
AST SERPL-CCNC: 17 U/L (ref 1–32)
BASOPHILS # BLD AUTO: 0.04 10*3/MM3 (ref 0–0.2)
BASOPHILS NFR BLD AUTO: 0.4 % (ref 0–1.5)
BILIRUB SERPL-MCNC: 0.4 MG/DL (ref 0–1.2)
BUN SERPL-MCNC: 13.1 MG/DL (ref 8–23)
BUN/CREAT SERPL: 15.6 (ref 7–25)
CALCIUM SPEC-SCNC: 9.4 MG/DL (ref 8.6–10.5)
CHLORIDE SERPL-SCNC: 101 MMOL/L (ref 98–107)
CO2 SERPL-SCNC: 22 MMOL/L (ref 22–29)
CREAT SERPL-MCNC: 0.84 MG/DL (ref 0.57–1)
D-LACTATE SERPL-SCNC: 1 MMOL/L (ref 0.5–2)
DEPRECATED RDW RBC AUTO: 41.6 FL (ref 37–54)
EGFRCR SERPLBLD CKD-EPI 2021: 75.8 ML/MIN/1.73
EOSINOPHIL # BLD AUTO: 0.38 10*3/MM3 (ref 0–0.4)
EOSINOPHIL NFR BLD AUTO: 3.8 % (ref 0.3–6.2)
ERYTHROCYTE [DISTWIDTH] IN BLOOD BY AUTOMATED COUNT: 13.2 % (ref 12.3–15.4)
GEN 5 1HR TROPONIN T REFLEX: 8 NG/L
GLOBULIN UR ELPH-MCNC: 3 GM/DL
GLUCOSE SERPL-MCNC: 92 MG/DL (ref 65–99)
HCT VFR BLD AUTO: 39.2 % (ref 34–46.6)
HGB BLD-MCNC: 13.1 G/DL (ref 12–15.9)
IMM GRANULOCYTES # BLD AUTO: 0.06 10*3/MM3 (ref 0–0.05)
IMM GRANULOCYTES NFR BLD AUTO: 0.6 % (ref 0–0.5)
INR PPP: 0.97 (ref 0.91–1.09)
LIPASE SERPL-CCNC: 33 U/L (ref 13–60)
LYMPHOCYTES # BLD AUTO: 1.99 10*3/MM3 (ref 0.7–3.1)
LYMPHOCYTES NFR BLD AUTO: 19.8 % (ref 19.6–45.3)
MAGNESIUM SERPL-MCNC: 1.9 MG/DL (ref 1.6–2.4)
MCH RBC QN AUTO: 29 PG (ref 26.6–33)
MCHC RBC AUTO-ENTMCNC: 33.4 G/DL (ref 31.5–35.7)
MCV RBC AUTO: 86.7 FL (ref 79–97)
MONOCYTES # BLD AUTO: 1.25 10*3/MM3 (ref 0.1–0.9)
MONOCYTES NFR BLD AUTO: 12.5 % (ref 5–12)
NEUTROPHILS NFR BLD AUTO: 6.31 10*3/MM3 (ref 1.7–7)
NEUTROPHILS NFR BLD AUTO: 62.9 % (ref 42.7–76)
NRBC BLD AUTO-RTO: 0 /100 WBC (ref 0–0.2)
NT-PROBNP SERPL-MCNC: <36 PG/ML (ref 0–900)
PLATELET # BLD AUTO: 251 10*3/MM3 (ref 140–450)
PMV BLD AUTO: 10.5 FL (ref 6–12)
POTASSIUM SERPL-SCNC: 3.8 MMOL/L (ref 3.5–5.2)
PROT SERPL-MCNC: 7 G/DL (ref 6–8.5)
PROTHROMBIN TIME: 13.4 SECONDS (ref 11.8–14.8)
RBC # BLD AUTO: 4.52 10*6/MM3 (ref 3.77–5.28)
SODIUM SERPL-SCNC: 136 MMOL/L (ref 136–145)
TROPONIN T NUMERIC DELTA: 0 NG/L
TROPONIN T SERPL HS-MCNC: 8 NG/L
WBC NRBC COR # BLD AUTO: 10.03 10*3/MM3 (ref 3.4–10.8)

## 2025-06-13 PROCEDURE — 93005 ELECTROCARDIOGRAM TRACING: CPT

## 2025-06-13 PROCEDURE — 80053 COMPREHEN METABOLIC PANEL: CPT | Performed by: FAMILY MEDICINE

## 2025-06-13 PROCEDURE — 83605 ASSAY OF LACTIC ACID: CPT | Performed by: FAMILY MEDICINE

## 2025-06-13 PROCEDURE — 85610 PROTHROMBIN TIME: CPT | Performed by: FAMILY MEDICINE

## 2025-06-13 PROCEDURE — 93005 ELECTROCARDIOGRAM TRACING: CPT | Performed by: FAMILY MEDICINE

## 2025-06-13 PROCEDURE — 93010 ELECTROCARDIOGRAM REPORT: CPT | Performed by: INTERNAL MEDICINE

## 2025-06-13 PROCEDURE — 99285 EMERGENCY DEPT VISIT HI MDM: CPT | Performed by: FAMILY MEDICINE

## 2025-06-13 PROCEDURE — 83735 ASSAY OF MAGNESIUM: CPT | Performed by: FAMILY MEDICINE

## 2025-06-13 PROCEDURE — 85025 COMPLETE CBC W/AUTO DIFF WBC: CPT | Performed by: FAMILY MEDICINE

## 2025-06-13 PROCEDURE — 25510000001 IOPAMIDOL 61 % SOLUTION: Performed by: FAMILY MEDICINE

## 2025-06-13 PROCEDURE — 36415 COLL VENOUS BLD VENIPUNCTURE: CPT

## 2025-06-13 PROCEDURE — 84484 ASSAY OF TROPONIN QUANT: CPT | Performed by: FAMILY MEDICINE

## 2025-06-13 PROCEDURE — 83690 ASSAY OF LIPASE: CPT | Performed by: FAMILY MEDICINE

## 2025-06-13 PROCEDURE — 71045 X-RAY EXAM CHEST 1 VIEW: CPT

## 2025-06-13 PROCEDURE — 99223 1ST HOSP IP/OBS HIGH 75: CPT | Performed by: INTERNAL MEDICINE

## 2025-06-13 PROCEDURE — 74177 CT ABD & PELVIS W/CONTRAST: CPT

## 2025-06-13 PROCEDURE — 83880 ASSAY OF NATRIURETIC PEPTIDE: CPT | Performed by: FAMILY MEDICINE

## 2025-06-13 PROCEDURE — 85730 THROMBOPLASTIN TIME PARTIAL: CPT | Performed by: FAMILY MEDICINE

## 2025-06-13 RX ORDER — PANTOPRAZOLE SODIUM 40 MG/10ML
40 INJECTION, POWDER, LYOPHILIZED, FOR SOLUTION INTRAVENOUS ONCE
Status: COMPLETED | OUTPATIENT
Start: 2025-06-13 | End: 2025-06-13

## 2025-06-13 RX ORDER — ACETAMINOPHEN 160 MG/5ML
650 SOLUTION ORAL EVERY 4 HOURS PRN
Status: DISCONTINUED | OUTPATIENT
Start: 2025-06-13 | End: 2025-06-17 | Stop reason: HOSPADM

## 2025-06-13 RX ORDER — IOPAMIDOL 612 MG/ML
100 INJECTION, SOLUTION INTRAVASCULAR
Status: COMPLETED | OUTPATIENT
Start: 2025-06-13 | End: 2025-06-13

## 2025-06-13 RX ORDER — ACETAMINOPHEN 325 MG/1
650 TABLET ORAL EVERY 4 HOURS PRN
Status: DISCONTINUED | OUTPATIENT
Start: 2025-06-13 | End: 2025-06-17 | Stop reason: HOSPADM

## 2025-06-13 RX ORDER — CETIRIZINE HYDROCHLORIDE 10 MG/1
10 TABLET ORAL DAILY
Status: DISCONTINUED | OUTPATIENT
Start: 2025-06-14 | End: 2025-06-17 | Stop reason: HOSPADM

## 2025-06-13 RX ORDER — SODIUM CHLORIDE 0.9 % (FLUSH) 0.9 %
10 SYRINGE (ML) INJECTION AS NEEDED
Status: DISCONTINUED | OUTPATIENT
Start: 2025-06-13 | End: 2025-06-14 | Stop reason: SDUPTHER

## 2025-06-13 RX ORDER — FLUTICASONE PROPIONATE 50 MCG
2 SPRAY, SUSPENSION (ML) NASAL DAILY
Status: DISCONTINUED | OUTPATIENT
Start: 2025-06-13 | End: 2025-06-17 | Stop reason: HOSPADM

## 2025-06-13 RX ORDER — LISINOPRIL 20 MG/1
20 TABLET ORAL
Status: DISCONTINUED | OUTPATIENT
Start: 2025-06-14 | End: 2025-06-17 | Stop reason: HOSPADM

## 2025-06-13 RX ORDER — PANTOPRAZOLE SODIUM 40 MG/1
40 TABLET, DELAYED RELEASE ORAL
Status: DISCONTINUED | OUTPATIENT
Start: 2025-06-14 | End: 2025-06-17 | Stop reason: HOSPADM

## 2025-06-13 RX ORDER — ATORVASTATIN CALCIUM 10 MG/1
20 TABLET, FILM COATED ORAL DAILY
Status: DISCONTINUED | OUTPATIENT
Start: 2025-06-14 | End: 2025-06-17 | Stop reason: HOSPADM

## 2025-06-13 RX ORDER — SODIUM CHLORIDE 0.9 % (FLUSH) 0.9 %
10 SYRINGE (ML) INJECTION EVERY 12 HOURS SCHEDULED
Status: DISCONTINUED | OUTPATIENT
Start: 2025-06-13 | End: 2025-06-17 | Stop reason: HOSPADM

## 2025-06-13 RX ORDER — SODIUM CHLORIDE 0.9 % (FLUSH) 0.9 %
10 SYRINGE (ML) INJECTION AS NEEDED
Status: DISCONTINUED | OUTPATIENT
Start: 2025-06-13 | End: 2025-06-17 | Stop reason: HOSPADM

## 2025-06-13 RX ORDER — SODIUM CHLORIDE 9 MG/ML
40 INJECTION, SOLUTION INTRAVENOUS AS NEEDED
Status: DISCONTINUED | OUTPATIENT
Start: 2025-06-13 | End: 2025-06-17 | Stop reason: HOSPADM

## 2025-06-13 RX ORDER — HYDROCHLOROTHIAZIDE 25 MG/1
25 TABLET ORAL
Status: DISCONTINUED | OUTPATIENT
Start: 2025-06-13 | End: 2025-06-17 | Stop reason: HOSPADM

## 2025-06-13 RX ORDER — ACETAMINOPHEN 650 MG/1
650 SUPPOSITORY RECTAL EVERY 4 HOURS PRN
Status: DISCONTINUED | OUTPATIENT
Start: 2025-06-13 | End: 2025-06-17 | Stop reason: HOSPADM

## 2025-06-13 RX ADMIN — PANTOPRAZOLE SODIUM 40 MG: 40 INJECTION, POWDER, FOR SOLUTION INTRAVENOUS at 14:53

## 2025-06-13 RX ADMIN — IOPAMIDOL 100 ML: 612 INJECTION, SOLUTION INTRAVENOUS at 14:17

## 2025-06-13 NOTE — ED PROVIDER NOTES
Subjective   History of Present Illness  70-year-old female comes emergency room multiple complaints.  She has epigastric chest pain radiating to the back.  She has been feeling nauseous.  She has been having to use a heating pad for the nausea and abdominal pain.  She states that she wore a Holter monitor a few weeks ago.  She has some abnormal findings.  She is awaiting to see cardiology.  She states that she called her cardiologist office and they advised her to come to the emergency room for evaluation today.  Patient denies any other symptoms at this time.      Review of Systems   Respiratory:  Positive for shortness of breath.    Cardiovascular:  Positive for chest pain.   Gastrointestinal:  Positive for abdominal pain and nausea.   All other systems reviewed and are negative.      Past Medical History:   Diagnosis Date    Allergies     Anxiety     Depression     Hypertension        No Known Allergies    Past Surgical History:   Procedure Laterality Date     SECTION  1978     SECTION  1975       Family History   Problem Relation Age of Onset    Heart disease Father        Social History     Socioeconomic History    Marital status:    Tobacco Use    Smoking status: Never     Passive exposure: Never    Smokeless tobacco: Never   Vaping Use    Vaping status: Never Used   Substance and Sexual Activity    Alcohol use: Never    Drug use: Never    Sexual activity: Defer           Objective   Physical Exam  Vitals and nursing note reviewed.   Constitutional:       Appearance: She is well-developed.   HENT:      Head: Normocephalic and atraumatic.      Mouth/Throat:      Mouth: Mucous membranes are moist.   Eyes:      Extraocular Movements: Extraocular movements intact.      Pupils: Pupils are equal, round, and reactive to light.   Cardiovascular:      Rate and Rhythm: Normal rate and regular rhythm.      Heart sounds: Normal heart sounds.   Pulmonary:      Effort: Pulmonary effort is normal.       Breath sounds: Normal breath sounds.   Abdominal:      General: Bowel sounds are normal.      Palpations: Abdomen is soft.      Tenderness: There is abdominal tenderness in the epigastric area. There is no guarding or rebound.   Skin:     General: Skin is warm and dry.   Neurological:      General: No focal deficit present.      Mental Status: She is alert and oriented to person, place, and time.   Psychiatric:         Mood and Affect: Mood normal.         Behavior: Behavior normal.         Procedures           ED Course  ED Course as of 06/13/25 1534   Fri Jun 13, 2025   1348 EKG rate 88  Normal sinus rhythm  No STEMI [RP]      ED Course User Index  [RP] Rui Townsend MD                                                     Lab Results (last 24 hours)       Procedure Component Value Units Date/Time    CBC & Differential [[id number]]  (Abnormal) Collected: 06/13/25 1342    Specimen: Blood from Arm, Right Updated: 06/13/25 1356    Narrative:      The following orders were created for panel order CBC & Differential.  Procedure                               Abnormality         Status                     ---------                               -----------         ------                     CBC Auto Differential[[id number]]        Abnormal            Final result                 Please view results for these tests on the individual orders.    Comprehensive Metabolic Panel [[id number]] Collected: 06/13/25 1342    Specimen: Blood from Arm, Right Updated: 06/13/25 1419     Glucose 92 mg/dL      BUN 13.1 mg/dL      Creatinine 0.84 mg/dL      Sodium 136 mmol/L      Potassium 3.8 mmol/L      Chloride 101 mmol/L      CO2 22.0 mmol/L      Calcium 9.4 mg/dL      Total Protein 7.0 g/dL      Albumin 4.0 g/dL      ALT (SGPT) 14 U/L      AST (SGOT) 17 U/L      Alkaline Phosphatase 108 U/L      Total Bilirubin 0.4 mg/dL      Globulin 3.0 gm/dL      A/G Ratio 1.3 g/dL      BUN/Creatinine Ratio 15.6     Anion Gap 13.0 mmol/L      eGFR 75.8  mL/min/1.73     Narrative:      GFR Categories in Chronic Kidney Disease (CKD)              GFR Category          GFR (mL/min/1.73)    Interpretation  G1                    90 or greater        Normal or high (1)  G2                    60-89                Mild decrease (1)  G3a                   45-59                Mild to moderate decrease  G3b                   30-44                Moderate to severe decrease  G4                    15-29                Severe decrease  G5                    14 or less           Kidney failure    (1)In the absence of evidence of kidney disease, neither GFR category G1 or G2 fulfill the criteria for CKD.    eGFR calculation 2021 CKD-EPI creatinine equation, which does not include race as a factor    Protime-INR [[id number]]  (Normal) Collected: 06/13/25 1342    Specimen: Blood from Arm, Right Updated: 06/13/25 1406     Protime 13.4 Seconds      INR 0.97    aPTT [[id number]]  (Normal) Collected: 06/13/25 1342    Specimen: Blood from Arm, Right Updated: 06/13/25 1406     PTT 28.9 seconds     Narrative:      PTT = The equivalent PTT values for the therapeutic range of heparin levels at 0.3 to 0.7 U/ml are 77 - 99 seconds.    Lipase [[id number]]  (Normal) Collected: 06/13/25 1342    Specimen: Blood from Arm, Right Updated: 06/13/25 1414     Lipase 33 U/L     High Sensitivity Troponin T [[id number]]  (Normal) Collected: 06/13/25 1342    Specimen: Blood from Arm, Right Updated: 06/13/25 1416     HS Troponin T 8 ng/L     Narrative:      High Sensitive Troponin T Reference Range:  <14.0 ng/L- Negative Female for AMI  <22.0 ng/L- Negative Male for AMI  >=14 - Abnormal Female indicating possible myocardial injury.  >=22 - Abnormal Male indicating possible myocardial injury.   Clinicians would have to utilize clinical acumen, EKG, Troponin, and serial changes to determine if it is an Acute Myocardial Infarction or myocardial injury due to an underlying chronic condition.         BNP [[id number]]   (Normal) Collected: 06/13/25 1342    Specimen: Blood from Arm, Right Updated: 06/13/25 1416     proBNP <36.0 pg/mL     Narrative:      This assay is used as an aid in the diagnosis of individuals suspected of having heart failure. It can be used as an aid in the diagnosis of acute decompensated heart failure (ADHF) in patients presenting with signs and symptoms of ADHF to the emergency department (ED). In addition, NT-proBNP of <300 pg/mL indicates ADHF is not likely.    Age Range Result Interpretation  NT-proBNP Concentration (pg/mL:      <50             Positive            >450                   Gray                 300-450                    Negative             <300    50-75           Positive            >900                  Gray                300-900                  Negative            <300      >75             Positive            >1800                  Gray                300-1800                  Negative            <300    Lactic Acid, Plasma [[id number]]  (Normal) Collected: 06/13/25 1342    Specimen: Blood from Arm, Right Updated: 06/13/25 1418     Lactate 1.0 mmol/L     Magnesium [[id number]]  (Normal) Collected: 06/13/25 1342    Specimen: Blood from Arm, Right Updated: 06/13/25 1416     Magnesium 1.9 mg/dL     CBC Auto Differential [[id number]]  (Abnormal) Collected: 06/13/25 1342    Specimen: Blood from Arm, Right Updated: 06/13/25 1356     WBC 10.03 10*3/mm3      RBC 4.52 10*6/mm3      Hemoglobin 13.1 g/dL      Hematocrit 39.2 %      MCV 86.7 fL      MCH 29.0 pg      MCHC 33.4 g/dL      RDW 13.2 %      RDW-SD 41.6 fl      MPV 10.5 fL      Platelets 251 10*3/mm3      Neutrophil % 62.9 %      Lymphocyte % 19.8 %      Monocyte % 12.5 %      Eosinophil % 3.8 %      Basophil % 0.4 %      Immature Grans % 0.6 %      Neutrophils, Absolute 6.31 10*3/mm3      Lymphocytes, Absolute 1.99 10*3/mm3      Monocytes, Absolute 1.25 10*3/mm3      Eosinophils, Absolute 0.38 10*3/mm3      Basophils, Absolute 0.04 10*3/mm3       Immature Grans, Absolute 0.06 10*3/mm3      nRBC 0.0 /100 WBC           CT Abdomen Pelvis With Contrast   Final Result       Diffuse distal esophageal wall thickening. Correlate for acute   esophagitis. Otherwise, no acute findings in the abdomen or pelvis.           This report was signed and finalized on 6/13/2025 2:31 PM by Dr. Zheng Sotomayor MD.          XR Chest 1 View   Final Result       No acute findings.       This report was signed and finalized on 6/13/2025 2:02 PM by Dr. Zheng Sotomayor MD.            Medications   sodium chloride 0.9 % flush 10 mL (has no administration in time range)   iopamidol (ISOVUE-300) 61 % injection 100 mL (100 mL Intravenous Given 6/13/25 1417)   pantoprazole (PROTONIX) injection 40 mg (40 mg Intravenous Given 6/13/25 2473)       Medical Decision Making  62-year-old female was found to have a heart block from a Holter monitor.  Case was discussed with cardiology on-call.  They have accepted the patient under their services.  Patient will be admitted to cardiac services for pacemaker placement.    Problems Addressed:  Complete heart block: complicated acute illness or injury    Amount and/or Complexity of Data Reviewed  External Data Reviewed: labs, ECG and notes.  Labs: ordered. Decision-making details documented in ED Course.  Radiology: ordered. Decision-making details documented in ED Course.  ECG/medicine tests: ordered. Decision-making details documented in ED Course.    Risk  Prescription drug management.  Decision regarding hospitalization.        Final diagnoses:   Complete heart block       ED Disposition  ED Disposition       ED Disposition   Decision to Admit    Condition   --    Comment   Level of Care: Telemetry [5]   Diagnosis: Complete heart block [[id number]]   Admitting Physician: VIKA OJEDA [[id number]]   Attending Physician: VIKA OJEDA [[id number]]   Certification: I Certify That Inpatient Hospital Services Are Medically Necessary For  Greater Than 2 Midnights                 No follow-up provider specified.       Medication List      No changes were made to your prescriptions during this visit.            Rui Townsend MD  06/13/25 5124

## 2025-06-13 NOTE — H&P
"Chief Complaint   Patient presents with    Abdominal Pain    Shortness of Breath    Back Pain       Subjective .     History of present illness:  Michelle Berg is a 70 y.o. yo female with history of a recent abnormal Holter monitor who presents today for evaluation of episodes of chest discomfort and presyncope.  The patient had been having episodes of \"dizziness\" and chest discomfort.  A stress echo  and Holter monitor were ordered.  The stress echo was later canceled for unknown reasons.  A 7-day monitor revealed the basic rhythm to be sinus with 147 sinus pauses of up to 5.1 seconds in length and 327 episodes of high degree AV block (second-degree type II and third-degree).  The patient was given a cardiology appointment later this month.  Meanwhile, she had also been worked up for gallbladder disease and though her gallbladder ultrasound was unremarkable she had a gallbladder ejection fraction of 6% on a HIDA scan.  There was evidence of biliary dyskinesia.  She has been evaluated by general surgery with cholecystectomy recommended.    In this setting, the patient has continued to have lightheaded episodes frequently.  She has not had any walt syncope though on one occasion she thought she might actually blackout completely.  She also has had some chest discomfort which occurs with exertion and at rest and lasts for variable amounts of time generally for a few minutes.  This is discomfort in the center of the chest with radiation to the upper mid back area at times.  She has also been experiencing abdominal discomfort and nausea which she attributes to her gallbladder issue.  The patient called the office this morning with these complaints and was advised to come to the emergency room for further evaluation.    The patient is  and is a non-smoker.  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  These are called safety boots.  There is a " history of coronary artery disease in her father.  She does have a history of hypertension and hyperlipidemia.        Chief Complaint   Patient presents with    Abdominal Pain    Shortness of Breath    Back Pain   .    History  Past Medical History:   Diagnosis Date    Allergies     Anxiety     Depression     Hypertension    ,   Past Surgical History:   Procedure Laterality Date     SECTION       SECTION     ,   Family History   Problem Relation Age of Onset    Heart disease Father    ,   Social History     Tobacco Use    Smoking status: Never     Passive exposure: Never    Smokeless tobacco: Never   Vaping Use    Vaping status: Never Used   Substance Use Topics    Alcohol use: Never    Drug use: Never   ,     Medications  Current Facility-Administered Medications   Medication Dose Route Frequency Provider Last Rate Last Admin    sodium chloride 0.9 % flush 10 mL  10 mL Intravenous PRN Rui Townsend MD         Current Outpatient Medications   Medication Sig Dispense Refill    atorvastatin (Lipitor) 20 MG tablet Take 1 tablet by mouth Daily. 90 tablet 0    cetirizine (zyrTEC) 10 MG tablet Take 1 tablet by mouth Daily. 90 tablet 1    FLUoxetine (PROzac) 20 MG capsule Take 1 capsule by mouth Daily. 90 capsule 1    fluticasone (FLONASE) 50 MCG/ACT nasal spray Administer 2 sprays into the nostril(s) as directed by provider Daily. 9.9 g 1    lisinopril-hydrochlorothiazide (PRINZIDE,ZESTORETIC) 20-25 MG per tablet Take 1 tablet by mouth Every Morning. 90 tablet 1    omeprazole (priLOSEC) 20 MG capsule Take 1 capsule by mouth 2 (Two) Times a Day. 30 capsule 0    ondansetron ODT (ZOFRAN-ODT) 4 MG disintegrating tablet Place 1 tablet on the tongue Every 8 (Eight) Hours As Needed for Nausea or Vomiting. 40 tablet 0    promethazine (PHENERGAN) 25 MG tablet Take 1 tablet by mouth Every 8 (Eight) Hours As Needed for Nausea or Vomiting. 30 tablet 0    vitamin D (ERGOCALCIFEROL) 1.25 MG (50529 UT) capsule  "capsule Take 1 capsule by mouth 1 (One) Time Per Week. 8 capsule 0       Allergies:  Patient has no known allergies.    Review of Systems  ROS    Objective     Physical Exam:  Patient Vitals for the past 24 hrs:   BP Temp Temp src Pulse Resp SpO2 Height Weight   06/13/25 1457 137/84 -- -- 85 16 99 % -- --   06/13/25 1401 121/78 -- -- 84 -- 100 % -- --   06/13/25 1348 -- 98.3 °F (36.8 °C) Oral -- -- -- -- --   06/13/25 1335 136/88 -- -- 88 19 100 % 167.6 cm (66\") 79.4 kg (175 lb)     Physical Exam  This is a 70-year-old female who is awake, alert and oriented x 3.  She is in no distress  HEENT: Normocephalic.  There is no scleral icterus.  Pupils are reactive.  Neck: No carotid bruits or jugular venous distention.  No thyromegaly.  Lungs: Clear to auscultation.  Normal respiratory effort.  Heart: Regular rhythm without audible murmur or gallop sound.  Abdomen: No masses tenderness or organomegaly.  Bowel sounds active.  Extremities: No pretibial edema or cyanosis.  Pedal pulses intact.  Neurologic: No obvious focal abnormalities noted.    Results Review:   I reviewed the patient's new clinical results.  Lab Results (last 24 hours)       Procedure Component Value Units Date/Time    Comprehensive Metabolic Panel [[id number]] Collected: 06/13/25 1342    Specimen: Blood from Arm, Right Updated: 06/13/25 1419     Glucose 92 mg/dL      BUN 13.1 mg/dL      Creatinine 0.84 mg/dL      Sodium 136 mmol/L      Potassium 3.8 mmol/L      Chloride 101 mmol/L      CO2 22.0 mmol/L      Calcium 9.4 mg/dL      Total Protein 7.0 g/dL      Albumin 4.0 g/dL      ALT (SGPT) 14 U/L      AST (SGOT) 17 U/L      Alkaline Phosphatase 108 U/L      Total Bilirubin 0.4 mg/dL      Globulin 3.0 gm/dL      A/G Ratio 1.3 g/dL      BUN/Creatinine Ratio 15.6     Anion Gap 13.0 mmol/L      eGFR 75.8 mL/min/1.73     Narrative:      GFR Categories in Chronic Kidney Disease (CKD)              GFR Category          GFR (mL/min/1.73)    Interpretation  G1      "               90 or greater        Normal or high (1)  G2                    60-89                Mild decrease (1)  G3a                   45-59                Mild to moderate decrease  G3b                   30-44                Moderate to severe decrease  G4                    15-29                Severe decrease  G5                    14 or less           Kidney failure    (1)In the absence of evidence of kidney disease, neither GFR category G1 or G2 fulfill the criteria for CKD.    eGFR calculation 2021 CKD-EPI creatinine equation, which does not include race as a factor    Lactic Acid, Plasma [[id number]]  (Normal) Collected: 06/13/25 1342    Specimen: Blood from Arm, Right Updated: 06/13/25 1418     Lactate 1.0 mmol/L     BNP [[id number]]  (Normal) Collected: 06/13/25 1342    Specimen: Blood from Arm, Right Updated: 06/13/25 1416     proBNP <36.0 pg/mL     Narrative:      This assay is used as an aid in the diagnosis of individuals suspected of having heart failure. It can be used as an aid in the diagnosis of acute decompensated heart failure (ADHF) in patients presenting with signs and symptoms of ADHF to the emergency department (ED). In addition, NT-proBNP of <300 pg/mL indicates ADHF is not likely.    Age Range Result Interpretation  NT-proBNP Concentration (pg/mL:      <50             Positive            >450                   Gray                 300-450                    Negative             <300    50-75           Positive            >900                  Gray                300-900                  Negative            <300      >75             Positive            >1800                  Gray                300-1800                  Negative            <300    Magnesium [[id number]]  (Normal) Collected: 06/13/25 1342    Specimen: Blood from Arm, Right Updated: 06/13/25 1416     Magnesium 1.9 mg/dL     High Sensitivity Troponin T [[id number]]  (Normal) Collected: 06/13/25 1342    Specimen: Blood from Arm,  Right Updated: 06/13/25 1416     HS Troponin T 8 ng/L     Narrative:      High Sensitive Troponin T Reference Range:  <14.0 ng/L- Negative Female for AMI  <22.0 ng/L- Negative Male for AMI  >=14 - Abnormal Female indicating possible myocardial injury.  >=22 - Abnormal Male indicating possible myocardial injury.   Clinicians would have to utilize clinical acumen, EKG, Troponin, and serial changes to determine if it is an Acute Myocardial Infarction or myocardial injury due to an underlying chronic condition.         Lipase [[id number]]  (Normal) Collected: 06/13/25 1342    Specimen: Blood from Arm, Right Updated: 06/13/25 1414     Lipase 33 U/L     Protime-INR [[id number]]  (Normal) Collected: 06/13/25 1342    Specimen: Blood from Arm, Right Updated: 06/13/25 1406     Protime 13.4 Seconds      INR 0.97    aPTT [[id number]]  (Normal) Collected: 06/13/25 1342    Specimen: Blood from Arm, Right Updated: 06/13/25 1406     PTT 28.9 seconds     Narrative:      PTT = The equivalent PTT values for the therapeutic range of heparin levels at 0.3 to 0.7 U/ml are 77 - 99 seconds.    CBC & Differential [[id number]]  (Abnormal) Collected: 06/13/25 1342    Specimen: Blood from Arm, Right Updated: 06/13/25 1356    Narrative:      The following orders were created for panel order CBC & Differential.  Procedure                               Abnormality         Status                     ---------                               -----------         ------                     CBC Auto Differential[[id number]]        Abnormal            Final result                 Please view results for these tests on the individual orders.    CBC Auto Differential [[id number]]  (Abnormal) Collected: 06/13/25 1342    Specimen: Blood from Arm, Right Updated: 06/13/25 1356     WBC 10.03 10*3/mm3      RBC 4.52 10*6/mm3      Hemoglobin 13.1 g/dL      Hematocrit 39.2 %      MCV 86.7 fL      MCH 29.0 pg      MCHC 33.4 g/dL      RDW 13.2 %      RDW-SD 41.6 fl       MPV 10.5 fL      Platelets 251 10*3/mm3      Neutrophil % 62.9 %      Lymphocyte % 19.8 %      Monocyte % 12.5 %      Eosinophil % 3.8 %      Basophil % 0.4 %      Immature Grans % 0.6 %      Neutrophils, Absolute 6.31 10*3/mm3      Lymphocytes, Absolute 1.99 10*3/mm3      Monocytes, Absolute 1.25 10*3/mm3      Eosinophils, Absolute 0.38 10*3/mm3      Basophils, Absolute 0.04 10*3/mm3      Immature Grans, Absolute 0.06 10*3/mm3      nRBC 0.0 /100 WBC           Imaging Results (Last 24 Hours)       Procedure Component Value Units Date/Time    CT Abdomen Pelvis With Contrast [[id number]] Collected: 06/13/25 1425     Updated: 06/13/25 1434    Narrative:      EXAM/TECHNIQUE: CT abdomen and pelvis with IV contrast     INDICATION: Epigastric pain radiating to the back     COMPARISON: None available.     DLP: 402.33 mGy.cm. Automated exposure control was utilized to decrease  patient radiation dose.     FINDINGS:     LOWER CHEST: No acute findings.     LIVER AND BILIARY: No focal liver lesion. No gallstones or biliary  ductal dilatation.      PANCREAS: Unremarkable.      SPLEEN: Unremarkable.      ADRENAL: Unremarkable.     KIDNEYS/BLADDER: No solid renal mass. No urolithiasis or hydronephrosis.  No focal urinary bladder wall thickening.      BOWEL: No colonic wall thickening or pericolonic fat stranding. Normal  appendix. Diffuse thickening of the distal esophagus. No small bowel  distention or evidence of active small bowel inflammation.     PERITONEUM: No ascites.      PELVIC ORGANS: Uterus and adnexa appear unremarkable.     VASCULATURE: Atherosclerotic nonaneurysmal abdominal aorta.     LYMPH NODES: No enlarged lymph nodes.      SOFT TISSUES: No acute findings.     BONES: Multilevel lumbar facet arthropathy. No acute osseous finding.       Impression:         Diffuse distal esophageal wall thickening. Correlate for acute  esophagitis. Otherwise, no acute findings in the abdomen or pelvis.        This report was signed  "and finalized on 6/13/2025 2:31 PM by Dr. Zheng Sotomayor MD.       XR Chest 1 View [[id number]] Collected: 06/13/25 1400     Updated: 06/13/25 1405    Narrative:      EXAM/TECHNIQUE: XR CHEST 1 VW-     INDICATION: Chest pain/shortness of breath     COMPARISON: 5/12/2025     FINDINGS:     Cardiac silhouette is within normal limits.     No pleural effusion or pneumothorax. No consolidation. Mild chronic  interstitial coarsening.     No acute osseous finding.       Impression:         No acute findings.     This report was signed and finalized on 6/13/2025 2:02 PM by Dr. Zheng Sotomayor MD.             No results found for: \"ECHOEFEST\"      Assessment & Plan   Problem(s):    Patient Active Problem List   Diagnosis    Essential hypertension    Mixed anxiety and depressive disorder    Elevated LDL cholesterol level    Class 1 obesity with body mass index (BMI) of 31.0 to 31.9 in adult    Overweight (BMI 25.0-29.9)    Biliary dyskinesia     1.  Cardiac conduction system disease.  I reviewed the Holter monitor and most, if not all of the episodes are due to third-degree AV block.  This is transient however.  There does not appear to be any sinus node issues of significance.  She is on no known medication that would induce bradycardia.  She has had no known illnesses that I would associate with bradycardia.  I believe that a pacemaker is indicated.  To that end I have consulted Dr. Kishor Brody, electrophysiologist who will see the patient and he has graciously agreed to perform pacemaker placement in the near future if he agrees that this is indicated.    2.  Chest pain.  I do not know the reason for the cancellation of her ischemic study but I do believe we need to perform one at some time.  At this point I believe that it can wait until after her pacemaker placement.  It should be performed before her cholecystectomy however.  If pacemaker is not performed tomorrow (Saturday) I will see if we can obtain a nuclear " stress test.  Otherwise, the ischemic study will wait until after the pacemaker.    3.  Hypertension.  She is on a Zestril HCTZ combination medication which we will continue.  Her current blood pressure is 137/84.    4.  Hyperlipidemia.  She was started on a atorvastatin 20 mg daily after a lipid panel revealed total cholesterol of 230, triglycerides 194, HDL 49 and LDL of 146 with VLDL of 35 on 5/20/2025.  Will recheck lipid panel in the morning.    5.  Biliary dyskinesia.  Cholecystectomy is planned for the future following cardiology clearance.    All questions were answered to the best of my ability.  Will continue to follow her progress.        Taz Romo MD  6/13/2025  15:27 CDT

## 2025-06-13 NOTE — TELEPHONE ENCOUNTER
Call returned to patient.  Recent Holter Monitor ordered 5/14 per Gail Allen.  Study read per Dr. Sanchez on 5/30 recommending urgent evaluation by Cardiology to evaluate for pacemaker placement.    Interpretation Summary         Total monitoring time was 7 days    Predominant rhythm was normal sinus with an average heart rate of 74 bpm, a low of 21 bpm and a high of 117 bpm    1 episode of SVT lasting 5 beats    147 episodes of pauses with the longest lasting 5.1 seconds.    327 episodes of high degree heart block (second-degree type II as well as third-degree were present)    Rare PACs, rare PVCs    Overall, this is an abnormal exam.  Recommend urgent evaluation by cardiology to evaluate for possible pacemaker placement.    Patient appears to have been referred to Cardiology and has initial appointment scheduled with Dr. Romo on 6/24.      Given message received today, and results noting complete heart block, information was reviewed with Clarence/JAIME for Dr. Romo.  Patient was asked to report to the ED for evaluation and treatment.  Patient verbalized understanding.  Will report.

## 2025-06-13 NOTE — TELEPHONE ENCOUNTER
Caller: Michelle Berg    Relationship to patient: Self    Best call back number: [phone]    Chief complaint: TROUBLE GETTING THROUGH THE DAY, ALL OVER PAIN, CHEST PAIN, BRADYCARDIA - PT BELIEVES ALL HAS TO DO WITH THE ISSUES WITH HER GALLBLADDER    Type of visit: NEW PT APPT    Requested date: NEXT SHILPA AS ADVISED      If rescheduling, when is the original appointment: 6.24.25     Additional notes:PT CALLED IN ASKING IF IT WAS POSSIBLE TO BE SEEN SOONER THAN WHAT IS SCHEDULED NOW. SHE IS IN A LOT OF PAIN AND NEEDS TO GET HER GALLBLADDER ADDRESSED ASAP. PT IS SCHEDULED TO SEE DR OJEDA BUT IS OPEN TO ANY OTHER DR IF IT MEANS SHE CAN BE SEEN SOONER. PLS CALL PT BACK TO [facility] - THANKS

## 2025-06-14 ENCOUNTER — APPOINTMENT (OUTPATIENT)
Facility: HOSPITAL | Age: 70
End: 2025-06-14
Payer: MEDICARE

## 2025-06-14 PROBLEM — R07.89 CHEST PAIN, ATYPICAL: Status: ACTIVE | Noted: 2025-06-14

## 2025-06-14 LAB
ALBUMIN SERPL-MCNC: 3.7 G/DL (ref 3.5–5.2)
ALBUMIN/GLOB SERPL: 1.4 G/DL
ALP SERPL-CCNC: 98 U/L (ref 39–117)
ALT SERPL W P-5'-P-CCNC: 12 U/L (ref 1–33)
ANION GAP SERPL CALCULATED.3IONS-SCNC: 11 MMOL/L (ref 5–15)
AST SERPL-CCNC: 16 U/L (ref 1–32)
BASOPHILS # BLD MANUAL: 0.18 10*3/MM3 (ref 0–0.2)
BASOPHILS NFR BLD MANUAL: 2 % (ref 0–1.5)
BH CV REST NUCLEAR ISOTOPE DOSE: 20.7 MCI
BH CV STRESS BP STAGE 1: NORMAL
BH CV STRESS COMMENTS STAGE 1: NORMAL
BH CV STRESS DOSE REGADENOSON STAGE 1: 0.4
BH CV STRESS DURATION MIN STAGE 1: 0
BH CV STRESS DURATION SEC STAGE 1: 10
BH CV STRESS HR STAGE 1: 113
BH CV STRESS NUCLEAR ISOTOPE DOSE: 20.6 MCI
BH CV STRESS PROTOCOL 1: NORMAL
BH CV STRESS RECOVERY BP: NORMAL MMHG
BH CV STRESS RECOVERY HR: 106 BPM
BH CV STRESS STAGE 1: 1
BILIRUB SERPL-MCNC: 0.2 MG/DL (ref 0–1.2)
BUN SERPL-MCNC: 16.7 MG/DL (ref 8–23)
BUN/CREAT SERPL: 21.4 (ref 7–25)
CALCIUM SPEC-SCNC: 9.1 MG/DL (ref 8.6–10.5)
CHLORIDE SERPL-SCNC: 103 MMOL/L (ref 98–107)
CO2 SERPL-SCNC: 23 MMOL/L (ref 22–29)
CREAT SERPL-MCNC: 0.78 MG/DL (ref 0.57–1)
DEPRECATED RDW RBC AUTO: 42.5 FL (ref 37–54)
EGFRCR SERPLBLD CKD-EPI 2021: 82.9 ML/MIN/1.73
EOSINOPHIL # BLD MANUAL: 0.64 10*3/MM3 (ref 0–0.4)
EOSINOPHIL NFR BLD MANUAL: 7.1 % (ref 0.3–6.2)
ERYTHROCYTE [DISTWIDTH] IN BLOOD BY AUTOMATED COUNT: 13.2 % (ref 12.3–15.4)
GLOBULIN UR ELPH-MCNC: 2.7 GM/DL
GLUCOSE SERPL-MCNC: 99 MG/DL (ref 65–99)
HCT VFR BLD AUTO: 37.2 % (ref 34–46.6)
HGB BLD-MCNC: 12.4 G/DL (ref 12–15.9)
INR PPP: 1.08 (ref 0.91–1.09)
LYMPHOCYTES # BLD MANUAL: 1.35 10*3/MM3 (ref 0.7–3.1)
LYMPHOCYTES NFR BLD MANUAL: 10.1 % (ref 5–12)
MAXIMAL PREDICTED HEART RATE: 152 BPM
MCH RBC QN AUTO: 29.1 PG (ref 26.6–33)
MCHC RBC AUTO-ENTMCNC: 33.3 G/DL (ref 31.5–35.7)
MCV RBC AUTO: 87.3 FL (ref 79–97)
MONOCYTES # BLD: 0.9 10*3/MM3 (ref 0.1–0.9)
NEUTROPHILS # BLD AUTO: 5.88 10*3/MM3 (ref 1.7–7)
NEUTROPHILS NFR BLD MANUAL: 65.7 % (ref 42.7–76)
PERCENT MAX PREDICTED HR: 74.34 %
PLAT MORPH BLD: NORMAL
PLATELET # BLD AUTO: 278 10*3/MM3 (ref 140–450)
PMV BLD AUTO: 11.4 FL (ref 6–12)
POLYCHROMASIA BLD QL SMEAR: ABNORMAL
POTASSIUM SERPL-SCNC: 3.8 MMOL/L (ref 3.5–5.2)
PROT SERPL-MCNC: 6.4 G/DL (ref 6–8.5)
PROTHROMBIN TIME: 14.5 SECONDS (ref 11.8–14.8)
QT INTERVAL: 410 MS
QTC INTERVAL: 496 MS
RBC # BLD AUTO: 4.26 10*6/MM3 (ref 3.77–5.28)
SODIUM SERPL-SCNC: 137 MMOL/L (ref 136–145)
SPECT HRT GATED+EF W RNC IV: 74 %
STRESS BASELINE BP: NORMAL MMHG
STRESS BASELINE HR: 85 BPM
STRESS PERCENT HR: 87 %
STRESS POST EXERCISE DUR SEC: 10 SEC
STRESS POST PEAK BP: NORMAL MMHG
STRESS POST PEAK HR: 113 BPM
STRESS TARGET HR: 129 BPM
VARIANT LYMPHS NFR BLD MANUAL: 11.1 % (ref 19.6–45.3)
VARIANT LYMPHS NFR BLD MANUAL: 4 % (ref 0–5)
WBC MORPH BLD: NORMAL
WBC NRBC COR # BLD AUTO: 8.95 10*3/MM3 (ref 3.4–10.8)

## 2025-06-14 PROCEDURE — 93018 CV STRESS TEST I&R ONLY: CPT | Performed by: INTERNAL MEDICINE

## 2025-06-14 PROCEDURE — 34310000005 RUBIDIUM CHLORIDE: Performed by: INTERNAL MEDICINE

## 2025-06-14 PROCEDURE — 78431 MYOCRD IMG PET RST&STRS CT: CPT

## 2025-06-14 PROCEDURE — 85007 BL SMEAR W/DIFF WBC COUNT: CPT | Performed by: NURSE PRACTITIONER

## 2025-06-14 PROCEDURE — 93017 CV STRESS TEST TRACING ONLY: CPT

## 2025-06-14 PROCEDURE — 80053 COMPREHEN METABOLIC PANEL: CPT | Performed by: NURSE PRACTITIONER

## 2025-06-14 PROCEDURE — A9555 RB82 RUBIDIUM: HCPCS | Performed by: INTERNAL MEDICINE

## 2025-06-14 PROCEDURE — 85027 COMPLETE CBC AUTOMATED: CPT | Performed by: NURSE PRACTITIONER

## 2025-06-14 PROCEDURE — 99223 1ST HOSP IP/OBS HIGH 75: CPT | Performed by: STUDENT IN AN ORGANIZED HEALTH CARE EDUCATION/TRAINING PROGRAM

## 2025-06-14 PROCEDURE — 63710000001 PROMETHAZINE PER 25 MG: Performed by: INTERNAL MEDICINE

## 2025-06-14 PROCEDURE — 99232 SBSQ HOSP IP/OBS MODERATE 35: CPT | Performed by: NURSE PRACTITIONER

## 2025-06-14 PROCEDURE — 85610 PROTHROMBIN TIME: CPT | Performed by: NURSE PRACTITIONER

## 2025-06-14 PROCEDURE — 25010000002 REGADENOSON 0.4 MG/5ML SOLUTION: Performed by: INTERNAL MEDICINE

## 2025-06-14 PROCEDURE — 93016 CV STRESS TEST SUPVJ ONLY: CPT | Performed by: INTERNAL MEDICINE

## 2025-06-14 PROCEDURE — 78431 MYOCRD IMG PET RST&STRS CT: CPT | Performed by: INTERNAL MEDICINE

## 2025-06-14 RX ORDER — PROMETHAZINE HYDROCHLORIDE 25 MG/1
25 TABLET ORAL EVERY 8 HOURS PRN
Status: DISCONTINUED | OUTPATIENT
Start: 2025-06-14 | End: 2025-06-17 | Stop reason: HOSPADM

## 2025-06-14 RX ORDER — REGADENOSON 0.08 MG/ML
0.4 INJECTION, SOLUTION INTRAVENOUS ONCE
Status: COMPLETED | OUTPATIENT
Start: 2025-06-14 | End: 2025-06-14

## 2025-06-14 RX ADMIN — LISINOPRIL 20 MG: 20 TABLET ORAL at 09:07

## 2025-06-14 RX ADMIN — PROMETHAZINE HYDROCHLORIDE 25 MG: 25 TABLET ORAL at 15:43

## 2025-06-14 RX ADMIN — Medication 10 ML: at 21:13

## 2025-06-14 RX ADMIN — ATORVASTATIN CALCIUM 20 MG: 10 TABLET, FILM COATED ORAL at 09:07

## 2025-06-14 RX ADMIN — HYDROCHLOROTHIAZIDE 25 MG: 25 TABLET ORAL at 09:07

## 2025-06-14 RX ADMIN — PANTOPRAZOLE SODIUM 40 MG: 40 TABLET, DELAYED RELEASE ORAL at 06:01

## 2025-06-14 RX ADMIN — FLUOXETINE HYDROCHLORIDE 20 MG: 20 CAPSULE ORAL at 09:07

## 2025-06-14 RX ADMIN — REGADENOSON 0.4 MG: 0.08 INJECTION, SOLUTION INTRAVENOUS at 11:58

## 2025-06-14 NOTE — PROGRESS NOTES
[facility] -  Progress Note     LOS: 1 day   Patient Care Team:  Gail Vegas APRN as PCP - General (Nurse Practitioner)  Aries Malhotra MD as PCP - Family Medicine    Chief Complaint:CHB follow up    Subjective     Interval History:   No episodes of heart block overnight per telemetry. Scheduled for cardiac PET scan today due to complaints of chest pain on admission. She feels well but is hungry as she is NPO  for testing.         Review of Systems:   Review of Systems   Constitutional:  Negative for diaphoresis, fatigue and unexpected weight change.   Respiratory:  Negative for chest tightness, shortness of breath and wheezing.    Cardiovascular:  Negative for chest pain, palpitations and leg swelling.   Gastrointestinal:  Negative for diarrhea, nausea and vomiting.   Neurological:  Negative for dizziness, syncope and light-headedness.   All other systems reviewed and are negative.      Objective     Vital Sign Min/Max for last 24 hours  Temp  Min: 97.4 °F (36.3 °C)  Max: 98.3 °F (36.8 °C)   BP  Min: 117/67  Max: 147/76   Pulse  Min: 72  Max: 88   Resp  Min: 16  Max: 19   SpO2  Min: 97 %  Max: 100 %   No data recorded   Weight  Min: 79.4 kg (175 lb)  Max: 79.4 kg (175 lb)         06/13/25  1335   Weight: 79.4 kg (175 lb)         Intake/Output Summary (Last 24 hours) at 6/14/2025 1035  Last data filed at 6/14/2025 0853  Gross per 24 hour   Intake 240 ml   Output --   Net 240 ml         Physical Exam:  Vitals reviewed.   Constitutional:       General: Not in acute distress.     Appearance: Healthy appearance. Well-developed. Not diaphoretic.   Eyes:      General: No scleral icterus.     Conjunctiva/sclera: Conjunctivae normal.      Pupils: Pupils are equal, round, and reactive to light.   HENT:      Head: Normocephalic.    Mouth/Throat:      Pharynx: No oropharyngeal exudate.   Neck:      Vascular: No JVR.   Pulmonary:      Effort: Pulmonary effort is normal. No respiratory distress.       Breath sounds: Normal breath sounds. No wheezing. No rhonchi. No rales.   Chest:      Chest wall: Not tender to palpatation.   Cardiovascular:      Normal rate. Regular rhythm.   Pulses:     Intact distal pulses.   Edema:     Peripheral edema absent.   Abdominal:      General: Bowel sounds are normal. There is no distension.      Palpations: Abdomen is soft.      Tenderness: There is no abdominal tenderness.   Musculoskeletal: Normal range of motion.      Cervical back: Normal range of motion and neck supple. Skin:     General: Skin is warm and dry.      Coloration: Skin is not pale.      Findings: No erythema or rash.   Neurological:      Mental Status: Alert, oriented to person, place, and time and oriented to person, place and time.      Deep Tendon Reflexes: Reflexes are normal and symmetric.   Psychiatric:         Behavior: Behavior normal.          Results Review:   Lab Results (last 72 hours)       Procedure Component Value Units Date/Time    CBC & Differential [[id number]]  (Abnormal) Collected: 06/14/25 0303    Specimen: Blood Updated: 06/14/25 0422    Narrative:      The following orders were created for panel order CBC & Differential.  Procedure                               Abnormality         Status                     ---------                               -----------         ------                     Manual Differential[[id number]]          Abnormal            Final result               CBC Auto Differential[[id number]]        Normal              Final result                 Please view results for these tests on the individual orders.    Manual Differential [[id number]]  (Abnormal) Collected: 06/14/25 0303    Specimen: Blood Updated: 06/14/25 0422     Neutrophil % 65.7 %      Lymphocyte % 11.1 %      Monocyte % 10.1 %      Eosinophil % 7.1 %      Basophil % 2.0 %      Atypical Lymphocyte % 4.0 %      Neutrophils Absolute 5.88 10*3/mm3      Lymphocytes Absolute 1.35 10*3/mm3      Monocytes Absolute  0.90 10*3/mm3      Eosinophils Absolute 0.64 10*3/mm3      Basophils Absolute 0.18 10*3/mm3      Polychromasia Slight/1+     WBC Morphology Normal     Platelet Morphology Normal    CBC Auto Differential [[id number]]  (Normal) Collected: 06/14/25 0303    Specimen: Blood Updated: 06/14/25 0422     WBC 8.95 10*3/mm3      RBC 4.26 10*6/mm3      Hemoglobin 12.4 g/dL      Hematocrit 37.2 %      MCV 87.3 fL      MCH 29.1 pg      MCHC 33.3 g/dL      RDW 13.2 %      RDW-SD 42.5 fl      MPV 11.4 fL      Platelets 278 10*3/mm3     Comprehensive Metabolic Panel [[id number]] Collected: 06/14/25 0303    Specimen: Blood Updated: 06/14/25 0410     Glucose 99 mg/dL      BUN 16.7 mg/dL      Creatinine 0.78 mg/dL      Sodium 137 mmol/L      Potassium 3.8 mmol/L      Chloride 103 mmol/L      CO2 23.0 mmol/L      Calcium 9.1 mg/dL      Total Protein 6.4 g/dL      Albumin 3.7 g/dL      ALT (SGPT) 12 U/L      AST (SGOT) 16 U/L      Alkaline Phosphatase 98 U/L      Total Bilirubin 0.2 mg/dL      Globulin 2.7 gm/dL      A/G Ratio 1.4 g/dL      BUN/Creatinine Ratio 21.4     Anion Gap 11.0 mmol/L      eGFR 82.9 mL/min/1.73     Narrative:      GFR Categories in Chronic Kidney Disease (CKD)              GFR Category          GFR (mL/min/1.73)    Interpretation  G1                    90 or greater        Normal or high (1)  G2                    60-89                Mild decrease (1)  G3a                   45-59                Mild to moderate decrease  G3b                   30-44                Moderate to severe decrease  G4                    15-29                Severe decrease  G5                    14 or less           Kidney failure    (1)In the absence of evidence of kidney disease, neither GFR category G1 or G2 fulfill the criteria for CKD.    eGFR calculation 2021 CKD-EPI creatinine equation, which does not include race as a factor    Protime-INR [[id number]]  (Normal) Collected: 06/14/25 0303    Specimen: Blood Updated: 06/14/25 0354      Protime 14.5 Seconds      INR 1.08    High Sensitivity Troponin T 1Hr [[id number]]  (Normal) Collected: 06/13/25 1453    Specimen: Blood Updated: 06/13/25 1631     HS Troponin T 8 ng/L      Troponin T Numeric Delta 0 ng/L     Narrative:      High Sensitive Troponin T Reference Range:  <14.0 ng/L- Negative Female for AMI  <22.0 ng/L- Negative Male for AMI  >=14 - Abnormal Female indicating possible myocardial injury.  >=22 - Abnormal Male indicating possible myocardial injury.   Clinicians would have to utilize clinical acumen, EKG, Troponin, and serial changes to determine if it is an Acute Myocardial Infarction or myocardial injury due to an underlying chronic condition.         Comprehensive Metabolic Panel [[id number]] Collected: 06/13/25 1342    Specimen: Blood from Arm, Right Updated: 06/13/25 1419     Glucose 92 mg/dL      BUN 13.1 mg/dL      Creatinine 0.84 mg/dL      Sodium 136 mmol/L      Potassium 3.8 mmol/L      Chloride 101 mmol/L      CO2 22.0 mmol/L      Calcium 9.4 mg/dL      Total Protein 7.0 g/dL      Albumin 4.0 g/dL      ALT (SGPT) 14 U/L      AST (SGOT) 17 U/L      Alkaline Phosphatase 108 U/L      Total Bilirubin 0.4 mg/dL      Globulin 3.0 gm/dL      A/G Ratio 1.3 g/dL      BUN/Creatinine Ratio 15.6     Anion Gap 13.0 mmol/L      eGFR 75.8 mL/min/1.73     Narrative:      GFR Categories in Chronic Kidney Disease (CKD)              GFR Category          GFR (mL/min/1.73)    Interpretation  G1                    90 or greater        Normal or high (1)  G2                    60-89                Mild decrease (1)  G3a                   45-59                Mild to moderate decrease  G3b                   30-44                Moderate to severe decrease  G4                    15-29                Severe decrease  G5                    14 or less           Kidney failure    (1)In the absence of evidence of kidney disease, neither GFR category G1 or G2 fulfill the criteria for CKD.    eGFR calculation  2021 CKD-EPI creatinine equation, which does not include race as a factor    Lactic Acid, Plasma [[id number]]  (Normal) Collected: 06/13/25 1342    Specimen: Blood from Arm, Right Updated: 06/13/25 1418     Lactate 1.0 mmol/L     BNP [[id number]]  (Normal) Collected: 06/13/25 1342    Specimen: Blood from Arm, Right Updated: 06/13/25 1416     proBNP <36.0 pg/mL     Narrative:      This assay is used as an aid in the diagnosis of individuals suspected of having heart failure. It can be used as an aid in the diagnosis of acute decompensated heart failure (ADHF) in patients presenting with signs and symptoms of ADHF to the emergency department (ED). In addition, NT-proBNP of <300 pg/mL indicates ADHF is not likely.    Age Range Result Interpretation  NT-proBNP Concentration (pg/mL:      <50             Positive            >450                   Gray                 300-450                    Negative             <300    50-75           Positive            >900                  Gray                300-900                  Negative            <300      >75             Positive            >1800                  Gray                300-1800                  Negative            <300    Magnesium [[id number]]  (Normal) Collected: 06/13/25 1342    Specimen: Blood from Arm, Right Updated: 06/13/25 1416     Magnesium 1.9 mg/dL     High Sensitivity Troponin T [[id number]]  (Normal) Collected: 06/13/25 1342    Specimen: Blood from Arm, Right Updated: 06/13/25 1416     HS Troponin T 8 ng/L     Narrative:      High Sensitive Troponin T Reference Range:  <14.0 ng/L- Negative Female for AMI  <22.0 ng/L- Negative Male for AMI  >=14 - Abnormal Female indicating possible myocardial injury.  >=22 - Abnormal Male indicating possible myocardial injury.   Clinicians would have to utilize clinical acumen, EKG, Troponin, and serial changes to determine if it is an Acute Myocardial Infarction or myocardial injury due to an underlying chronic  condition.         Lipase [[id number]]  (Normal) Collected: 06/13/25 1342    Specimen: Blood from Arm, Right Updated: 06/13/25 1414     Lipase 33 U/L     Protime-INR [[id number]]  (Normal) Collected: 06/13/25 1342    Specimen: Blood from Arm, Right Updated: 06/13/25 1406     Protime 13.4 Seconds      INR 0.97    aPTT [[id number]]  (Normal) Collected: 06/13/25 1342    Specimen: Blood from Arm, Right Updated: 06/13/25 1406     PTT 28.9 seconds     Narrative:      PTT = The equivalent PTT values for the therapeutic range of heparin levels at 0.3 to 0.7 U/ml are 77 - 99 seconds.    CBC & Differential [[id number]]  (Abnormal) Collected: 06/13/25 1342    Specimen: Blood from Arm, Right Updated: 06/13/25 1356    Narrative:      The following orders were created for panel order CBC & Differential.  Procedure                               Abnormality         Status                     ---------                               -----------         ------                     CBC Auto Differential[[id number]]        Abnormal            Final result                 Please view results for these tests on the individual orders.    CBC Auto Differential [[id number]]  (Abnormal) Collected: 06/13/25 1342    Specimen: Blood from Arm, Right Updated: 06/13/25 1356     WBC 10.03 10*3/mm3      RBC 4.52 10*6/mm3      Hemoglobin 13.1 g/dL      Hematocrit 39.2 %      MCV 86.7 fL      MCH 29.0 pg      MCHC 33.4 g/dL      RDW 13.2 %      RDW-SD 41.6 fl      MPV 10.5 fL      Platelets 251 10*3/mm3      Neutrophil % 62.9 %      Lymphocyte % 19.8 %      Monocyte % 12.5 %      Eosinophil % 3.8 %      Basophil % 0.4 %      Immature Grans % 0.6 %      Neutrophils, Absolute 6.31 10*3/mm3      Lymphocytes, Absolute 1.99 10*3/mm3      Monocytes, Absolute 1.25 10*3/mm3      Eosinophils, Absolute 0.38 10*3/mm3      Basophils, Absolute 0.04 10*3/mm3      Immature Grans, Absolute 0.06 10*3/mm3      nRBC 0.0 /100 WBC                      Medication Review:  yes  Current Facility-Administered Medications   Medication Dose Route Frequency Provider Last Rate Last Admin    acetaminophen (TYLENOL) tablet 650 mg  650 mg Oral Q4H PRN Manda Lima APRN        Or    acetaminophen (TYLENOL) 160 MG/5ML oral solution 650 mg  650 mg Oral Q4H PRN Manda Lima APRN        Or    acetaminophen (TYLENOL) suppository 650 mg  650 mg Rectal Q4H PRN Manda Lima APRN        atorvastatin (LIPITOR) tablet 20 mg  20 mg Oral Daily Manda Lima APRN   20 mg at 06/14/25 0907    cetirizine (zyrTEC) tablet 10 mg  10 mg Oral Daily Manda Lima APRN        FLUoxetine (PROzac) capsule 20 mg  20 mg Oral Daily Manda Lima APRN   20 mg at 06/14/25 0907    fluticasone (FLONASE) 50 MCG/ACT nasal spray 2 spray  2 spray Nasal Daily Manda Lima APRN        lisinopril (PRINIVIL,ZESTRIL) tablet 20 mg  20 mg Oral Q24H Manda Lima APRN   20 mg at 06/14/25 0907    And    hydroCHLOROthiazide tablet 25 mg  25 mg Oral Q24H Manda Lima APRN   25 mg at 06/14/25 0907    pantoprazole (PROTONIX) EC tablet 40 mg  40 mg Oral Q AM Manda Lima, APRN   40 mg at 06/14/25 0601    sodium chloride 0.9 % flush 10 mL  10 mL Intravenous PRN Manda Lima APRN        sodium chloride 0.9 % flush 10 mL  10 mL Intravenous Q12H Manda Lima, JAIME        sodium chloride 0.9 % flush 10 mL  10 mL Intravenous PRN Manda Lima, APRN        sodium chloride 0.9 % infusion 40 mL  40 mL Intravenous PRN Manda Lima APRN             Assessment & Plan       Complete heart block    Chest pain, atypical    Plan:   CHB: transient per holter. No evidence of CHB per telemetry. EP consulted with tentative plans for pacemaker implant on Monday. Continue to monitor tele    Chest pain: cardiac PET to be completed today. Has had no chest pain overnight. Ok to eat once testing is completed    Further recommendations per Dr. Romo    Electronically signed by JAIME Martinez, 06/14/25, 10:23 AM  CDT.

## 2025-06-14 NOTE — CONSULTS
"EP CONSULT NOTE    Subjective        History of Present Illness    EP Problems:  1.  Right bundle branch block  2.  Mobitz 2 AV block  3.  Intermittent complete heart block    Cardiology Problems:  1.  Hypertension  2.  Hyperlipidemia  3.  Atypical chest pain    Medical Problems:  1.  Anxiety      Michelle Berg is a 70 y.o. female with problem list as above for whom EP is consulted regarding intermittent complete heart block.  She recently wore a Holter monitor for dizziness which was notable for frequent episodes of intermittent complete heart block.  She presented to the hospital and has been in sinus rhythm since presentation.    Objective   Vital Signs:  /85   Pulse 85   Temp 98 °F (36.7 °C) (Oral)   Resp 18   Ht 167.6 cm (66\")   Wt 79.4 kg (175 lb)   SpO2 98%   BMI 28.25 kg/m²   Estimated body mass index is 28.25 kg/m² as calculated from the following:    Height as of this encounter: 167.6 cm (66\").    Weight as of this encounter: 79.4 kg (175 lb).      Physical Exam  Vitals reviewed.   Constitutional:       Appearance: Normal appearance.   Cardiovascular:      Rate and Rhythm: Normal rate and regular rhythm.      Pulses: Normal pulses.      Heart sounds: Normal heart sounds. No murmur heard.  Pulmonary:      Effort: Pulmonary effort is normal. No respiratory distress.      Breath sounds: Normal breath sounds.   Skin:     General: Skin is warm and dry.   Neurological:      General: No focal deficit present.      Mental Status: She is alert.   Psychiatric:         Mood and Affect: Mood normal.         Judgment: Judgment normal.          Result Review :  The following data was reviewed by: Kishor Brody MD on 06/13/2025:  CMP          5/19/2025    11:08 6/13/2025    13:42 6/14/2025    03:03   CMP   Glucose 97  92  99    BUN 15  13.1  16.7    Creatinine 0.90  0.84  0.78    EGFR 69.8  75.8  82.9    Sodium 137  136  137    Potassium 3.8  3.8  3.8    Chloride 104  101  103    Calcium 9.7  9.4  9.1  "   Total Protein 8.1  7.0  6.4    Albumin 4.6  4.0  3.7    Globulin 3.5  3.0  2.7    Total Bilirubin 0.3  0.4  0.2    Alkaline Phosphatase 91  108  98    AST (SGOT) 21  17  16    ALT (SGPT) 17  14  12    Albumin/Globulin Ratio 1.3  1.3  1.4    BUN/Creatinine Ratio 16.7  15.6  21.4    Anion Gap 9.0  13.0  11.0      CBC          5/19/2025    11:08 6/13/2025    13:42 6/14/2025    03:03   CBC   WBC 8.80  10.03  8.95    RBC 4.94  4.52  4.26    Hemoglobin 14.4  13.1  12.4    Hematocrit 43.5  39.2  37.2    MCV 88.1  86.7  87.3    MCH 29.1  29.0  29.1    MCHC 33.1  33.4  33.3    RDW 12.9  13.2  13.2    Platelets 308  251  278      TSH          5/19/2025    11:08   TSH   TSH 1.970      Chest x-ray from yesterday directly visualized independently reviewed: Low lung volumes, normal pericardial silhouette.         Assessment and Plan   Problems:  Symptomatic, irreversible, intermittent complete heart block    Michelle Berg is a 70 y.o. female with problem list as above for whom EP is consulted regarding intermittent complete heart block.  She has evidence of infranodal AV block with intermittent intermittent complete heart block.  As such, an pacemaker is recommended.  We discussed the risks and benefits of leadless pacemaker versus traditional dual-chamber pacemaker.  Given her relative young age and active lifestyle, she is more in favor of a leadless pacemaker.  I think that this is a very reasonable decision.  Will plan to do this procedure on Monday unless worsening evidence of AV block is seen.    I have discussed risks, benefits, and alternatives of a leadless pacemaker implant with the patient.  Alternatives discussed include continued observation and medical management.  A leadless pacemaker implant is an inherently high risk procedure with possible complications that include but are not limited to acute or chronic pain at the implant site, bleeding and hematoma, pseudoaneurysm, air embolism, pericardial effusion  requiring pericardiocentesis or cardiac surgery, device dislodgement or embolization, device malfunction, infection, contrast induced nephropathy, and ultimately death.   The possible need for additional procedures and/or medical therapy was discussed. Questions asked were appropriately answered.  No guarantees were made or implied.   Despite this, they would still like to proceed.    Plan:  - Leadless pacemaker implant on Monday  - Avoid AV danette blockers for now                 Part of this note may be an electronic transcription/translation of spoken language to printed text using the Dragon Dictation System.

## 2025-06-14 NOTE — PLAN OF CARE
Goal Outcome Evaluation:      Pt. A&Ox4. VSS. RA. Pt. No c/o pain. NPO since 0000 for cardiac PET scan. NSR on tele 70-86, pvcs. Safety maintained.

## 2025-06-15 LAB
ANION GAP SERPL CALCULATED.3IONS-SCNC: 16 MMOL/L (ref 5–15)
B PARAPERT DNA SPEC QL NAA+PROBE: NOT DETECTED
B PERT DNA SPEC QL NAA+PROBE: NOT DETECTED
BUN SERPL-MCNC: 16 MG/DL (ref 8–23)
BUN/CREAT SERPL: 17.6 (ref 7–25)
C PNEUM DNA NPH QL NAA+NON-PROBE: NOT DETECTED
CALCIUM SPEC-SCNC: 9.7 MG/DL (ref 8.6–10.5)
CHLORIDE SERPL-SCNC: 101 MMOL/L (ref 98–107)
CO2 SERPL-SCNC: 22 MMOL/L (ref 22–29)
CREAT SERPL-MCNC: 0.91 MG/DL (ref 0.57–1)
DEPRECATED RDW RBC AUTO: 42.8 FL (ref 37–54)
EGFRCR SERPLBLD CKD-EPI 2021: 68.9 ML/MIN/1.73
ERYTHROCYTE [DISTWIDTH] IN BLOOD BY AUTOMATED COUNT: 13.2 % (ref 12.3–15.4)
FLUAV SUBTYP SPEC NAA+PROBE: NOT DETECTED
FLUBV RNA ISLT QL NAA+PROBE: NOT DETECTED
GLUCOSE SERPL-MCNC: 110 MG/DL (ref 65–99)
HADV DNA SPEC NAA+PROBE: NOT DETECTED
HCOV 229E RNA SPEC QL NAA+PROBE: NOT DETECTED
HCOV HKU1 RNA SPEC QL NAA+PROBE: NOT DETECTED
HCOV NL63 RNA SPEC QL NAA+PROBE: NOT DETECTED
HCOV OC43 RNA SPEC QL NAA+PROBE: NOT DETECTED
HCT VFR BLD AUTO: 42.4 % (ref 34–46.6)
HGB BLD-MCNC: 14.1 G/DL (ref 12–15.9)
HMPV RNA NPH QL NAA+NON-PROBE: NOT DETECTED
HPIV1 RNA ISLT QL NAA+PROBE: NOT DETECTED
HPIV2 RNA SPEC QL NAA+PROBE: NOT DETECTED
HPIV3 RNA NPH QL NAA+PROBE: NOT DETECTED
HPIV4 P GENE NPH QL NAA+PROBE: NOT DETECTED
INR PPP: 0.97 (ref 0.91–1.09)
M PNEUMO IGG SER IA-ACNC: NOT DETECTED
MCH RBC QN AUTO: 29.2 PG (ref 26.6–33)
MCHC RBC AUTO-ENTMCNC: 33.3 G/DL (ref 31.5–35.7)
MCV RBC AUTO: 87.8 FL (ref 79–97)
PLATELET # BLD AUTO: 320 10*3/MM3 (ref 140–450)
PMV BLD AUTO: 10.9 FL (ref 6–12)
POTASSIUM SERPL-SCNC: 3.4 MMOL/L (ref 3.5–5.2)
PROTHROMBIN TIME: 13.4 SECONDS (ref 11.8–14.8)
RBC # BLD AUTO: 4.83 10*6/MM3 (ref 3.77–5.28)
RHINOVIRUS RNA SPEC NAA+PROBE: DETECTED
RSV RNA NPH QL NAA+NON-PROBE: NOT DETECTED
SARS-COV-2 RNA RESP QL NAA+PROBE: NOT DETECTED
SODIUM SERPL-SCNC: 139 MMOL/L (ref 136–145)
WBC NRBC COR # BLD AUTO: 10.29 10*3/MM3 (ref 3.4–10.8)

## 2025-06-15 PROCEDURE — 63710000001 PROMETHAZINE PER 25 MG: Performed by: INTERNAL MEDICINE

## 2025-06-15 PROCEDURE — 85027 COMPLETE CBC AUTOMATED: CPT | Performed by: STUDENT IN AN ORGANIZED HEALTH CARE EDUCATION/TRAINING PROGRAM

## 2025-06-15 PROCEDURE — 85610 PROTHROMBIN TIME: CPT | Performed by: STUDENT IN AN ORGANIZED HEALTH CARE EDUCATION/TRAINING PROGRAM

## 2025-06-15 PROCEDURE — 0202U NFCT DS 22 TRGT SARS-COV-2: CPT | Performed by: NURSE PRACTITIONER

## 2025-06-15 PROCEDURE — 99231 SBSQ HOSP IP/OBS SF/LOW 25: CPT | Performed by: NURSE PRACTITIONER

## 2025-06-15 PROCEDURE — 80048 BASIC METABOLIC PNL TOTAL CA: CPT | Performed by: STUDENT IN AN ORGANIZED HEALTH CARE EDUCATION/TRAINING PROGRAM

## 2025-06-15 RX ADMIN — Medication 10 ML: at 21:41

## 2025-06-15 RX ADMIN — Medication 10 ML: at 09:26

## 2025-06-15 RX ADMIN — PROMETHAZINE HYDROCHLORIDE 25 MG: 25 TABLET ORAL at 17:40

## 2025-06-15 RX ADMIN — HYDROCHLOROTHIAZIDE 25 MG: 25 TABLET ORAL at 09:26

## 2025-06-15 RX ADMIN — PANTOPRAZOLE SODIUM 40 MG: 40 TABLET, DELAYED RELEASE ORAL at 05:58

## 2025-06-15 RX ADMIN — ATORVASTATIN CALCIUM 20 MG: 10 TABLET, FILM COATED ORAL at 09:26

## 2025-06-15 RX ADMIN — FLUOXETINE HYDROCHLORIDE 20 MG: 20 CAPSULE ORAL at 09:26

## 2025-06-15 RX ADMIN — LISINOPRIL 20 MG: 20 TABLET ORAL at 09:26

## 2025-06-15 NOTE — PROGRESS NOTES
[facility] -  Progress Note     LOS: 2 days   Patient Care Team:  Gail Vegas APRN as PCP - General (Nurse Practitioner)  Aries Malhotra MD as PCP - Family Medicine    Chief Complaint:CHB f/u     Subjective     Interval History:   Underwent cardiac PET yesterday with results being negative. Has remained in NSR per telemetry. Plans for Micra pacemaker placement tomorrow per Dr. Brody         Review of Systems:   Review of Systems   Constitutional:  Negative for diaphoresis, fatigue and unexpected weight change.   Respiratory:  Negative for chest tightness, shortness of breath and wheezing.    Cardiovascular:  Negative for chest pain, palpitations and leg swelling.   Gastrointestinal:  Negative for diarrhea, nausea and vomiting.   Neurological:  Negative for dizziness, syncope and light-headedness.   All other systems reviewed and are negative.      Objective     Vital Sign Min/Max for last 24 hours  Temp  Min: 97.5 °F (36.4 °C)  Max: 98.3 °F (36.8 °C)   BP  Min: 97/60  Max: 131/85   Pulse  Min: 65  Max: 86   Resp  Min: 16  Max: 18   SpO2  Min: 93 %  Max: 99 %   No data recorded   Weight  Min: 79.4 kg (175 lb)  Max: 79.4 kg (175 lb)         06/14/25  1132   Weight: 79.4 kg (175 lb)         Intake/Output Summary (Last 24 hours) at 6/15/2025 1058  Last data filed at 6/14/2025 1823  Gross per 24 hour   Intake 600 ml   Output --   Net 600 ml         Physical Exam:  Vitals reviewed.   Constitutional:       General: Not in acute distress.     Appearance: Healthy appearance. Well-developed. Not diaphoretic.   Eyes:      General: No scleral icterus.     Conjunctiva/sclera: Conjunctivae normal.      Pupils: Pupils are equal, round, and reactive to light.   HENT:      Head: Normocephalic.    Mouth/Throat:      Pharynx: No oropharyngeal exudate.   Neck:      Vascular: No JVR.   Pulmonary:      Effort: Pulmonary effort is normal. No respiratory distress.      Breath sounds: Normal breath sounds. No  wheezing. No rhonchi. No rales.   Chest:      Chest wall: Not tender to palpatation.   Cardiovascular:      Normal rate. Regular rhythm.   Pulses:     Intact distal pulses.   Edema:     Peripheral edema absent.   Abdominal:      General: Bowel sounds are normal. There is no distension.      Palpations: Abdomen is soft.      Tenderness: There is no abdominal tenderness.   Musculoskeletal: Normal range of motion.      Cervical back: Normal range of motion and neck supple. Skin:     General: Skin is warm and dry.      Coloration: Skin is not pale.      Findings: No erythema or rash.   Neurological:      Mental Status: Alert, oriented to person, place, and time and oriented to person, place and time.      Deep Tendon Reflexes: Reflexes are normal and symmetric.   Psychiatric:         Behavior: Behavior normal.          Results Review:   Lab Results (last 72 hours)       Procedure Component Value Units Date/Time    CBC & Differential [[id number]]  (Abnormal) Collected: 06/14/25 0303    Specimen: Blood Updated: 06/14/25 0422    Narrative:      The following orders were created for panel order CBC & Differential.  Procedure                               Abnormality         Status                     ---------                               -----------         ------                     Manual Differential[[id number]]          Abnormal            Final result               CBC Auto Differential[[id number]]        Normal              Final result                 Please view results for these tests on the individual orders.    Manual Differential [[id number]]  (Abnormal) Collected: 06/14/25 0303    Specimen: Blood Updated: 06/14/25 0422     Neutrophil % 65.7 %      Lymphocyte % 11.1 %      Monocyte % 10.1 %      Eosinophil % 7.1 %      Basophil % 2.0 %      Atypical Lymphocyte % 4.0 %      Neutrophils Absolute 5.88 10*3/mm3      Lymphocytes Absolute 1.35 10*3/mm3      Monocytes Absolute 0.90 10*3/mm3      Eosinophils Absolute 0.64  10*3/mm3      Basophils Absolute 0.18 10*3/mm3      Polychromasia Slight/1+     WBC Morphology Normal     Platelet Morphology Normal    CBC Auto Differential [[id number]]  (Normal) Collected: 06/14/25 0303    Specimen: Blood Updated: 06/14/25 0422     WBC 8.95 10*3/mm3      RBC 4.26 10*6/mm3      Hemoglobin 12.4 g/dL      Hematocrit 37.2 %      MCV 87.3 fL      MCH 29.1 pg      MCHC 33.3 g/dL      RDW 13.2 %      RDW-SD 42.5 fl      MPV 11.4 fL      Platelets 278 10*3/mm3     Comprehensive Metabolic Panel [[id number]] Collected: 06/14/25 0303    Specimen: Blood Updated: 06/14/25 0410     Glucose 99 mg/dL      BUN 16.7 mg/dL      Creatinine 0.78 mg/dL      Sodium 137 mmol/L      Potassium 3.8 mmol/L      Chloride 103 mmol/L      CO2 23.0 mmol/L      Calcium 9.1 mg/dL      Total Protein 6.4 g/dL      Albumin 3.7 g/dL      ALT (SGPT) 12 U/L      AST (SGOT) 16 U/L      Alkaline Phosphatase 98 U/L      Total Bilirubin 0.2 mg/dL      Globulin 2.7 gm/dL      A/G Ratio 1.4 g/dL      BUN/Creatinine Ratio 21.4     Anion Gap 11.0 mmol/L      eGFR 82.9 mL/min/1.73     Narrative:      GFR Categories in Chronic Kidney Disease (CKD)              GFR Category          GFR (mL/min/1.73)    Interpretation  G1                    90 or greater        Normal or high (1)  G2                    60-89                Mild decrease (1)  G3a                   45-59                Mild to moderate decrease  G3b                   30-44                Moderate to severe decrease  G4                    15-29                Severe decrease  G5                    14 or less           Kidney failure    (1)In the absence of evidence of kidney disease, neither GFR category G1 or G2 fulfill the criteria for CKD.    eGFR calculation 2021 CKD-EPI creatinine equation, which does not include race as a factor    Protime-INR [[id number]]  (Normal) Collected: 06/14/25 0303    Specimen: Blood Updated: 06/14/25 0358     Protime 14.5 Seconds      INR 1.08    High  Sensitivity Troponin T 1Hr [[id number]]  (Normal) Collected: 06/13/25 1453    Specimen: Blood Updated: 06/13/25 1631     HS Troponin T 8 ng/L      Troponin T Numeric Delta 0 ng/L     Narrative:      High Sensitive Troponin T Reference Range:  <14.0 ng/L- Negative Female for AMI  <22.0 ng/L- Negative Male for AMI  >=14 - Abnormal Female indicating possible myocardial injury.  >=22 - Abnormal Male indicating possible myocardial injury.   Clinicians would have to utilize clinical acumen, EKG, Troponin, and serial changes to determine if it is an Acute Myocardial Infarction or myocardial injury due to an underlying chronic condition.         Comprehensive Metabolic Panel [[id number]] Collected: 06/13/25 1342    Specimen: Blood from Arm, Right Updated: 06/13/25 1419     Glucose 92 mg/dL      BUN 13.1 mg/dL      Creatinine 0.84 mg/dL      Sodium 136 mmol/L      Potassium 3.8 mmol/L      Chloride 101 mmol/L      CO2 22.0 mmol/L      Calcium 9.4 mg/dL      Total Protein 7.0 g/dL      Albumin 4.0 g/dL      ALT (SGPT) 14 U/L      AST (SGOT) 17 U/L      Alkaline Phosphatase 108 U/L      Total Bilirubin 0.4 mg/dL      Globulin 3.0 gm/dL      A/G Ratio 1.3 g/dL      BUN/Creatinine Ratio 15.6     Anion Gap 13.0 mmol/L      eGFR 75.8 mL/min/1.73     Narrative:      GFR Categories in Chronic Kidney Disease (CKD)              GFR Category          GFR (mL/min/1.73)    Interpretation  G1                    90 or greater        Normal or high (1)  G2                    60-89                Mild decrease (1)  G3a                   45-59                Mild to moderate decrease  G3b                   30-44                Moderate to severe decrease  G4                    15-29                Severe decrease  G5                    14 or less           Kidney failure    (1)In the absence of evidence of kidney disease, neither GFR category G1 or G2 fulfill the criteria for CKD.    eGFR calculation 2021 CKD-EPI creatinine equation, which does  not include race as a factor    Lactic Acid, Plasma [[id number]]  (Normal) Collected: 06/13/25 1342    Specimen: Blood from Arm, Right Updated: 06/13/25 1418     Lactate 1.0 mmol/L     BNP [[id number]]  (Normal) Collected: 06/13/25 1342    Specimen: Blood from Arm, Right Updated: 06/13/25 1416     proBNP <36.0 pg/mL     Narrative:      This assay is used as an aid in the diagnosis of individuals suspected of having heart failure. It can be used as an aid in the diagnosis of acute decompensated heart failure (ADHF) in patients presenting with signs and symptoms of ADHF to the emergency department (ED). In addition, NT-proBNP of <300 pg/mL indicates ADHF is not likely.    Age Range Result Interpretation  NT-proBNP Concentration (pg/mL:      <50             Positive            >450                   Gray                 300-450                    Negative             <300    50-75           Positive            >900                  Gray                300-900                  Negative            <300      >75             Positive            >1800                  Gray                300-1800                  Negative            <300    Magnesium [[id number]]  (Normal) Collected: 06/13/25 1342    Specimen: Blood from Arm, Right Updated: 06/13/25 1416     Magnesium 1.9 mg/dL     High Sensitivity Troponin T [[id number]]  (Normal) Collected: 06/13/25 1342    Specimen: Blood from Arm, Right Updated: 06/13/25 1416     HS Troponin T 8 ng/L     Narrative:      High Sensitive Troponin T Reference Range:  <14.0 ng/L- Negative Female for AMI  <22.0 ng/L- Negative Male for AMI  >=14 - Abnormal Female indicating possible myocardial injury.  >=22 - Abnormal Male indicating possible myocardial injury.   Clinicians would have to utilize clinical acumen, EKG, Troponin, and serial changes to determine if it is an Acute Myocardial Infarction or myocardial injury due to an underlying chronic condition.         Lipase [[id number]]  (Normal)  Collected: 06/13/25 1342    Specimen: Blood from Arm, Right Updated: 06/13/25 1414     Lipase 33 U/L     Protime-INR [[id number]]  (Normal) Collected: 06/13/25 1342    Specimen: Blood from Arm, Right Updated: 06/13/25 1406     Protime 13.4 Seconds      INR 0.97    aPTT [[id number]]  (Normal) Collected: 06/13/25 1342    Specimen: Blood from Arm, Right Updated: 06/13/25 1406     PTT 28.9 seconds     Narrative:      PTT = The equivalent PTT values for the therapeutic range of heparin levels at 0.3 to 0.7 U/ml are 77 - 99 seconds.    CBC & Differential [[id number]]  (Abnormal) Collected: 06/13/25 1342    Specimen: Blood from Arm, Right Updated: 06/13/25 1356    Narrative:      The following orders were created for panel order CBC & Differential.  Procedure                               Abnormality         Status                     ---------                               -----------         ------                     CBC Auto Differential[[id number]]        Abnormal            Final result                 Please view results for these tests on the individual orders.    CBC Auto Differential [[id number]]  (Abnormal) Collected: 06/13/25 1342    Specimen: Blood from Arm, Right Updated: 06/13/25 1356     WBC 10.03 10*3/mm3      RBC 4.52 10*6/mm3      Hemoglobin 13.1 g/dL      Hematocrit 39.2 %      MCV 86.7 fL      MCH 29.0 pg      MCHC 33.4 g/dL      RDW 13.2 %      RDW-SD 41.6 fl      MPV 10.5 fL      Platelets 251 10*3/mm3      Neutrophil % 62.9 %      Lymphocyte % 19.8 %      Monocyte % 12.5 %      Eosinophil % 3.8 %      Basophil % 0.4 %      Immature Grans % 0.6 %      Neutrophils, Absolute 6.31 10*3/mm3      Lymphocytes, Absolute 1.99 10*3/mm3      Monocytes, Absolute 1.25 10*3/mm3      Eosinophils, Absolute 0.38 10*3/mm3      Basophils, Absolute 0.04 10*3/mm3      Immature Grans, Absolute 0.06 10*3/mm3      nRBC 0.0 /100 WBC                      Medication Review: yes  Current Facility-Administered Medications    Medication Dose Route Frequency Provider Last Rate Last Admin    acetaminophen (TYLENOL) tablet 650 mg  650 mg Oral Q4H PRN Manda Lima APRN        Or    acetaminophen (TYLENOL) 160 MG/5ML oral solution 650 mg  650 mg Oral Q4H PRN Manda Lima APRN        Or    acetaminophen (TYLENOL) suppository 650 mg  650 mg Rectal Q4H PRN Manda Lima, APRN        atorvastatin (LIPITOR) tablet 20 mg  20 mg Oral Daily Manda Lima, APRN   20 mg at 06/15/25 0926    cetirizine (zyrTEC) tablet 10 mg  10 mg Oral Daily Manda Lima APRMISAEL        FLUoxetine (PROzac) capsule 20 mg  20 mg Oral Daily Manda Lima APRN   20 mg at 06/15/25 0926    fluticasone (FLONASE) 50 MCG/ACT nasal spray 2 spray  2 spray Nasal Daily Manda Lima APRMISAEL        lisinopril (PRINIVIL,ZESTRIL) tablet 20 mg  20 mg Oral Q24H Manda Lima APRN   20 mg at 06/15/25 0926    And    hydroCHLOROthiazide tablet 25 mg  25 mg Oral Q24H Manda Lima, APRN   25 mg at 06/15/25 0926    pantoprazole (PROTONIX) EC tablet 40 mg  40 mg Oral Q AM Manda Lima APRN   40 mg at 06/15/25 0558    promethazine (PHENERGAN) tablet 25 mg  25 mg Oral Q8H PRN Taz Romo MD   25 mg at 06/14/25 1543    sodium chloride 0.9 % flush 10 mL  10 mL Intravenous Q12H Manda Lima, APRN   10 mL at 06/15/25 0926    sodium chloride 0.9 % flush 10 mL  10 mL Intravenous PRN Manda Lima APRN        sodium chloride 0.9 % infusion 40 mL  40 mL Intravenous PRN Manda Lima APRN             Assessment & Plan       Complete heart block    Chest pain, atypical    Plan:   CHB: transient per holter. No heart block since she has been here. EP consulted with plans for Micra tomorrow. Continue to monitor tele. NPO after midnight     Chest pain: cardiac PET low risk.    Further recommendations per Dr. Romo    Electronically signed by JAIME Martinez, 06/15/25, 10:58 AM CDT.

## 2025-06-15 NOTE — PLAN OF CARE
Goal Outcome Evaluation:  Plan of Care Reviewed With: patient        Progress: no change  Outcome Evaluation: Pt A&Ox4. C/o nausea, but refused meds. Voiding up adlib. Abd tender to palpation, LUQ firm. PPP, dorsalis pedis weak +1 bilaterally. Vitals stable. NS-60-72 BBB per Tele. Denies pain, safety maintained. NPO @midnight on 6/16/25. Anticipating Leadless Pacer placement on 6/16/25.

## 2025-06-16 ENCOUNTER — APPOINTMENT (OUTPATIENT)
Dept: GENERAL RADIOLOGY | Facility: HOSPITAL | Age: 70
End: 2025-06-16
Payer: MEDICARE

## 2025-06-16 PROBLEM — Z00.6 ENCOUNTER FOR EXAMINATION FOR NORMAL COMPARISON AND CONTROL IN CLINICAL RESEARCH PROGRAM: Status: ACTIVE | Noted: 2025-06-13

## 2025-06-16 LAB
QT INTERVAL: 438 MS
QTC INTERVAL: 475 MS

## 2025-06-16 PROCEDURE — C1894 INTRO/SHEATH, NON-LASER: HCPCS | Performed by: STUDENT IN AN ORGANIZED HEALTH CARE EDUCATION/TRAINING PROGRAM

## 2025-06-16 PROCEDURE — 25010000002 HEPARIN (PORCINE) PER 1000 UNITS: Performed by: STUDENT IN AN ORGANIZED HEALTH CARE EDUCATION/TRAINING PROGRAM

## 2025-06-16 PROCEDURE — 33274 TCAT INSJ/RPL PERM LDLS PM: CPT | Performed by: STUDENT IN AN ORGANIZED HEALTH CARE EDUCATION/TRAINING PROGRAM

## 2025-06-16 PROCEDURE — 99152 MOD SED SAME PHYS/QHP 5/>YRS: CPT | Performed by: STUDENT IN AN ORGANIZED HEALTH CARE EDUCATION/TRAINING PROGRAM

## 2025-06-16 PROCEDURE — S0260 H&P FOR SURGERY: HCPCS | Performed by: STUDENT IN AN ORGANIZED HEALTH CARE EDUCATION/TRAINING PROGRAM

## 2025-06-16 PROCEDURE — 99024 POSTOP FOLLOW-UP VISIT: CPT

## 2025-06-16 PROCEDURE — 93005 ELECTROCARDIOGRAM TRACING: CPT | Performed by: STUDENT IN AN ORGANIZED HEALTH CARE EDUCATION/TRAINING PROGRAM

## 2025-06-16 PROCEDURE — C1769 GUIDE WIRE: HCPCS | Performed by: STUDENT IN AN ORGANIZED HEALTH CARE EDUCATION/TRAINING PROGRAM

## 2025-06-16 PROCEDURE — 25010000002 LIDOCAINE 2% SOLUTION: Performed by: STUDENT IN AN ORGANIZED HEALTH CARE EDUCATION/TRAINING PROGRAM

## 2025-06-16 PROCEDURE — 93010 ELECTROCARDIOGRAM REPORT: CPT | Performed by: HOSPITALIST

## 2025-06-16 PROCEDURE — C1786 PMKR, SINGLE, RATE-RESP: HCPCS | Performed by: STUDENT IN AN ORGANIZED HEALTH CARE EDUCATION/TRAINING PROGRAM

## 2025-06-16 PROCEDURE — 25010000002 CEFAZOLIN PER 500 MG: Performed by: STUDENT IN AN ORGANIZED HEALTH CARE EDUCATION/TRAINING PROGRAM

## 2025-06-16 PROCEDURE — 25010000002 FENTANYL CITRATE (PF) 50 MCG/ML SOLUTION: Performed by: STUDENT IN AN ORGANIZED HEALTH CARE EDUCATION/TRAINING PROGRAM

## 2025-06-16 PROCEDURE — 63710000001 PROMETHAZINE PER 25 MG: Performed by: STUDENT IN AN ORGANIZED HEALTH CARE EDUCATION/TRAINING PROGRAM

## 2025-06-16 PROCEDURE — 25010000002 MIDAZOLAM PER 1MG: Performed by: STUDENT IN AN ORGANIZED HEALTH CARE EDUCATION/TRAINING PROGRAM

## 2025-06-16 PROCEDURE — 25010000002 HEPARIN (PORCINE) 2000-0.9 UNIT/L-% SOLUTION: Performed by: STUDENT IN AN ORGANIZED HEALTH CARE EDUCATION/TRAINING PROGRAM

## 2025-06-16 PROCEDURE — 71045 X-RAY EXAM CHEST 1 VIEW: CPT

## 2025-06-16 PROCEDURE — 25510000001 IOPAMIDOL 61 % SOLUTION: Performed by: STUDENT IN AN ORGANIZED HEALTH CARE EDUCATION/TRAINING PROGRAM

## 2025-06-16 PROCEDURE — 93010 ELECTROCARDIOGRAM REPORT: CPT | Performed by: INTERNAL MEDICINE

## 2025-06-16 PROCEDURE — 02HK3NZ INSERTION OF INTRACARDIAC PACEMAKER INTO RIGHT VENTRICLE, PERCUTANEOUS APPROACH: ICD-10-PCS | Performed by: STUDENT IN AN ORGANIZED HEALTH CARE EDUCATION/TRAINING PROGRAM

## 2025-06-16 DEVICE — SYS PACE MICRA LD/LESS AV2: Type: IMPLANTABLE DEVICE | Status: FUNCTIONAL

## 2025-06-16 RX ORDER — FENTANYL CITRATE 50 UG/ML
INJECTION, SOLUTION INTRAMUSCULAR; INTRAVENOUS
Status: DISCONTINUED | OUTPATIENT
Start: 2025-06-16 | End: 2025-06-16 | Stop reason: HOSPADM

## 2025-06-16 RX ORDER — IOPAMIDOL 612 MG/ML
INJECTION, SOLUTION INTRAVASCULAR
Status: DISCONTINUED | OUTPATIENT
Start: 2025-06-16 | End: 2025-06-16 | Stop reason: HOSPADM

## 2025-06-16 RX ORDER — SODIUM CHLORIDE 9 MG/ML
40 INJECTION, SOLUTION INTRAVENOUS AS NEEDED
Status: DISCONTINUED | OUTPATIENT
Start: 2025-06-16 | End: 2025-06-16

## 2025-06-16 RX ORDER — SODIUM CHLORIDE 0.9 % (FLUSH) 0.9 %
10 SYRINGE (ML) INJECTION EVERY 12 HOURS SCHEDULED
Status: DISCONTINUED | OUTPATIENT
Start: 2025-06-16 | End: 2025-06-16

## 2025-06-16 RX ORDER — SODIUM CHLORIDE 0.9 % (FLUSH) 0.9 %
10 SYRINGE (ML) INJECTION AS NEEDED
Status: DISCONTINUED | OUTPATIENT
Start: 2025-06-16 | End: 2025-06-16

## 2025-06-16 RX ORDER — HEPARIN SODIUM 5000 [USP'U]/ML
INJECTION, SOLUTION INTRAVENOUS; SUBCUTANEOUS
Status: DISCONTINUED | OUTPATIENT
Start: 2025-06-16 | End: 2025-06-16 | Stop reason: HOSPADM

## 2025-06-16 RX ORDER — MIDAZOLAM HYDROCHLORIDE 2 MG/2ML
INJECTION, SOLUTION INTRAMUSCULAR; INTRAVENOUS
Status: DISCONTINUED | OUTPATIENT
Start: 2025-06-16 | End: 2025-06-16 | Stop reason: HOSPADM

## 2025-06-16 RX ORDER — HEPARIN SODIUM 200 [USP'U]/100ML
INJECTION, SOLUTION INTRAVENOUS
Status: DISCONTINUED | OUTPATIENT
Start: 2025-06-16 | End: 2025-06-16 | Stop reason: HOSPADM

## 2025-06-16 RX ORDER — LIDOCAINE HYDROCHLORIDE 20 MG/ML
INJECTION, SOLUTION INFILTRATION; PERINEURAL
Status: DISCONTINUED | OUTPATIENT
Start: 2025-06-16 | End: 2025-06-16 | Stop reason: HOSPADM

## 2025-06-16 RX ADMIN — PANTOPRAZOLE SODIUM 40 MG: 40 TABLET, DELAYED RELEASE ORAL at 06:17

## 2025-06-16 RX ADMIN — CETIRIZINE HYDROCHLORIDE 10 MG: 10 TABLET, FILM COATED ORAL at 08:24

## 2025-06-16 RX ADMIN — LISINOPRIL 20 MG: 20 TABLET ORAL at 08:24

## 2025-06-16 RX ADMIN — ATORVASTATIN CALCIUM 20 MG: 10 TABLET, FILM COATED ORAL at 08:24

## 2025-06-16 RX ADMIN — Medication 10 ML: at 08:24

## 2025-06-16 RX ADMIN — FLUOXETINE HYDROCHLORIDE 20 MG: 20 CAPSULE ORAL at 08:24

## 2025-06-16 RX ADMIN — HYDROCHLOROTHIAZIDE 25 MG: 25 TABLET ORAL at 08:24

## 2025-06-16 RX ADMIN — Medication 10 ML: at 22:31

## 2025-06-16 RX ADMIN — PROMETHAZINE HYDROCHLORIDE 25 MG: 25 TABLET ORAL at 18:16

## 2025-06-16 NOTE — PLAN OF CARE
Problem: Adult Inpatient Plan of Care  Goal: Plan of Care Review  6/16/2025 0540 by Sandy Bojorquez RNA  Outcome: Progressing  Flowsheets  Taken 6/16/2025 0540  Progress: no change     Goal Outcome Evaluation:  Plan of Care Reviewed With: patient        Progress: no change  Outcome Evaluation: Pt A&Ox4. C/o nausea, wasn't time for meds. When offered at time available pt declined. Voiding up adlib. LUQ and RUQ abdomen firm, all quadrants tender on palpation. PPP, dorsalis pedis weak +1 bilaterally. Vitals stable. NS-64-87 per Tele. Denies pain, safety maintained. Anticipating Leadless Pacer Placement today on 6/16/25. NPO since midnight. Safety maintained.

## 2025-06-16 NOTE — PLAN OF CARE
Goal Outcome Evaluation:  Plan of Care Reviewed With: patient        Progress: no change  Outcome Evaluation: VSS. Denies pain this shift. NSR 74-93 on tele. Pt went for leadless PPM today with Dr. Brody, currently in procedure. Safety maintained. Plan of care ongoing.

## 2025-06-16 NOTE — H&P
"EP Problems:  1.  Right bundle branch block  2.  Mobitz 2 AV block  3.  Intermittent complete heart block     Cardiology Problems:  1.  Hypertension  2.  Hyperlipidemia  3.  Atypical chest pain     Medical Problems:  1.  Anxiety       Patient ID:  Michelle Berg is a 70 y.o. female with problem list as above as above who EP is following for intermittent complete heart block.    Subjective:  No significant events.  Diagnosed with rhinovirus.    Objective:  /65 (BP Location: Left arm, Patient Position: Lying)   Pulse 75   Temp 98.3 °F (36.8 °C) (Oral)   Resp 18   Ht 167.6 cm (66\")   Wt 77.7 kg (171 lb 6.4 oz)   SpO2 98%   BMI 27.66 kg/m²     Awake, alert, no acute distress  Clear to auscultation bilaterally  Regular rate and rhythm  Warm, well-perfused    Labs today:  Lab Results   Component Value Date    GLUCOSE 110 (H) 06/15/2025    CALCIUM 9.7 06/15/2025     06/15/2025    K 3.4 (L) 06/15/2025    CO2 22.0 06/15/2025    BUN 16.0 06/15/2025    CREATININE 0.91 06/15/2025    EGFR 68.9 06/15/2025     Lab Results   Component Value Date    WBC 10.29 06/15/2025    HGB 14.1 06/15/2025    HCT 42.4 06/15/2025     06/15/2025       Assessment:  Intermittent complete heart block    Plan:  - Proceed with leadless pacemaker implant    Part of this note may be an electronic transcription/translation of spoken language to printed text using the Dragon Dictation System.    "

## 2025-06-16 NOTE — PROGRESS NOTES
[facility] -  Progress Note     LOS: 3 days   Patient Care Team:  Gail Vegas APRN as PCP - General (Nurse Practitioner)  Aries Malhotra MD as PCP - Family Medicine    Chief Complaint: Follow-up intermittent complete heart block    Subjective     Interval History:   Upon examination patient is uncomfortable in bed.  Family at bedside.  She is rating her pacemaker and surgery.  Denies any complaints today.      Review of Systems:     Review of Systems   All other systems reviewed and are negative.    Objective     Vital Sign Min/Max for last 24 hours  Temp  Min: 97.2 °F (36.2 °C)  Max: 98.3 °F (36.8 °C)   BP  Min: 101/52  Max: 119/63   Pulse  Min: 65  Max: 77   Resp  Min: 16  Max: 18   SpO2  Min: 95 %  Max: 100 %   No data recorded   Weight  Min: 77.7 kg (171 lb 6.4 oz)  Max: 77.7 kg (171 lb 6.4 oz)         06/16/25  0513   Weight: 77.7 kg (171 lb 6.4 oz)       Telemetry: Sinus rhythm rates in the 60s to 70s      Physical Exam:    Constitutional:       Appearance: Healthy appearance. Not in distress.   Pulmonary:      Effort: Pulmonary effort is normal.      Breath sounds: Normal breath sounds.   Cardiovascular:      PMI at left midclavicular line. Normal rate. Regular rhythm. Normal S1. Normal S2.       Murmurs: There is no murmur.      No gallop.  No click. No rub.   Pulses:     Intact distal pulses.   Edema:     Peripheral edema absent.   Neurological:      Mental Status: Alert and oriented to person, place and time.       Results Review:   Lab Results (last 72 hours)       Procedure Component Value Units Date/Time    Basic Metabolic Panel [[id number]]  (Abnormal) Collected: 06/15/25 1741    Specimen: Blood Updated: 06/15/25 1827     Glucose 110 mg/dL      BUN 16.0 mg/dL      Creatinine 0.91 mg/dL      Sodium 139 mmol/L      Potassium 3.4 mmol/L      Chloride 101 mmol/L      CO2 22.0 mmol/L      Calcium 9.7 mg/dL      BUN/Creatinine Ratio 17.6     Anion Gap 16.0 mmol/L      eGFR 68.9  mL/min/1.73     Narrative:      GFR Categories in Chronic Kidney Disease (CKD)              GFR Category          GFR (mL/min/1.73)    Interpretation  G1                    90 or greater        Normal or high (1)  G2                    60-89                Mild decrease (1)  G3a                   45-59                Mild to moderate decrease  G3b                   30-44                Moderate to severe decrease  G4                    15-29                Severe decrease  G5                    14 or less           Kidney failure    (1)In the absence of evidence of kidney disease, neither GFR category G1 or G2 fulfill the criteria for CKD.    eGFR calculation 2021 CKD-EPI creatinine equation, which does not include race as a factor    Protime-INR [[id number]]  (Normal) Collected: 06/15/25 1741    Specimen: Blood Updated: 06/15/25 1816     Protime 13.4 Seconds      INR 0.97    CBC (No Diff) [[id number]]  (Normal) Collected: 06/15/25 1741    Specimen: Blood Updated: 06/15/25 1811     WBC 10.29 10*3/mm3      RBC 4.83 10*6/mm3      Hemoglobin 14.1 g/dL      Hematocrit 42.4 %      MCV 87.8 fL      MCH 29.2 pg      MCHC 33.3 g/dL      RDW 13.2 %      RDW-SD 42.8 fl      MPV 10.9 fL      Platelets 320 10*3/mm3     Respiratory Panel PCR w/COVID-19(SARS-CoV-2) OLIVIA/EDITA/ROGER/PAD/COR/SHIRA In-House, NP Swab in [facility], 2 HR TAT - Swab, Nasopharynx [[id number]]  (Abnormal) Collected: 06/15/25 1529    Specimen: Swab from Nasopharynx Updated: 06/15/25 1643     ADENOVIRUS, PCR Not Detected     Coronavirus 229E Not Detected     Coronavirus HKU1 Not Detected     Coronavirus NL63 Not Detected     Coronavirus OC43 Not Detected     COVID19 Not Detected     Human Metapneumovirus Not Detected     Human Rhinovirus/Enterovirus Detected     Influenza A PCR Not Detected     Influenza B PCR Not Detected     Parainfluenza Virus 1 Not Detected     Parainfluenza Virus 2 Not Detected     Parainfluenza Virus 3 Not Detected     Parainfluenza Virus 4 Not  Detected     RSV, PCR Not Detected     Bordetella pertussis pcr Not Detected     Bordetella parapertussis PCR Not Detected     Chlamydophila pneumoniae PCR Not Detected     Mycoplasma pneumo by PCR Not Detected    Narrative:      In the setting of a positive respiratory panel with a viral infection PLUS a negative procalcitonin without other underlying concern for bacterial infection, consider observing off antibiotics or discontinuation of antibiotics and continue supportive care. If the respiratory panel is positive for atypical bacterial infection (Bordetella pertussis, Chlamydophila pneumoniae, or Mycoplasma pneumoniae), consider antibiotic de-escalation to target atypical bacterial infection.    CBC & Differential [[id number]]  (Abnormal) Collected: 06/14/25 0303    Specimen: Blood Updated: 06/14/25 0422    Narrative:      The following orders were created for panel order CBC & Differential.  Procedure                               Abnormality         Status                     ---------                               -----------         ------                     Manual Differential[[id number]]          Abnormal            Final result               CBC Auto Differential[[id number]]        Normal              Final result                 Please view results for these tests on the individual orders.    Manual Differential [[id number]]  (Abnormal) Collected: 06/14/25 0303    Specimen: Blood Updated: 06/14/25 0422     Neutrophil % 65.7 %      Lymphocyte % 11.1 %      Monocyte % 10.1 %      Eosinophil % 7.1 %      Basophil % 2.0 %      Atypical Lymphocyte % 4.0 %      Neutrophils Absolute 5.88 10*3/mm3      Lymphocytes Absolute 1.35 10*3/mm3      Monocytes Absolute 0.90 10*3/mm3      Eosinophils Absolute 0.64 10*3/mm3      Basophils Absolute 0.18 10*3/mm3      Polychromasia Slight/1+     WBC Morphology Normal     Platelet Morphology Normal    CBC Auto Differential [[id number]]  (Normal) Collected: 06/14/25 0303     Specimen: Blood Updated: 06/14/25 0422     WBC 8.95 10*3/mm3      RBC 4.26 10*6/mm3      Hemoglobin 12.4 g/dL      Hematocrit 37.2 %      MCV 87.3 fL      MCH 29.1 pg      MCHC 33.3 g/dL      RDW 13.2 %      RDW-SD 42.5 fl      MPV 11.4 fL      Platelets 278 10*3/mm3     Comprehensive Metabolic Panel [[id number]] Collected: 06/14/25 0303    Specimen: Blood Updated: 06/14/25 0410     Glucose 99 mg/dL      BUN 16.7 mg/dL      Creatinine 0.78 mg/dL      Sodium 137 mmol/L      Potassium 3.8 mmol/L      Chloride 103 mmol/L      CO2 23.0 mmol/L      Calcium 9.1 mg/dL      Total Protein 6.4 g/dL      Albumin 3.7 g/dL      ALT (SGPT) 12 U/L      AST (SGOT) 16 U/L      Alkaline Phosphatase 98 U/L      Total Bilirubin 0.2 mg/dL      Globulin 2.7 gm/dL      A/G Ratio 1.4 g/dL      BUN/Creatinine Ratio 21.4     Anion Gap 11.0 mmol/L      eGFR 82.9 mL/min/1.73     Narrative:      GFR Categories in Chronic Kidney Disease (CKD)              GFR Category          GFR (mL/min/1.73)    Interpretation  G1                    90 or greater        Normal or high (1)  G2                    60-89                Mild decrease (1)  G3a                   45-59                Mild to moderate decrease  G3b                   30-44                Moderate to severe decrease  G4                    15-29                Severe decrease  G5                    14 or less           Kidney failure    (1)In the absence of evidence of kidney disease, neither GFR category G1 or G2 fulfill the criteria for CKD.    eGFR calculation 2021 CKD-EPI creatinine equation, which does not include race as a factor    Protime-INR [[id number]]  (Normal) Collected: 06/14/25 0303    Specimen: Blood Updated: 06/14/25 0358     Protime 14.5 Seconds      INR 1.08    High Sensitivity Troponin T 1Hr [[id number]]  (Normal) Collected: 06/13/25 1453    Specimen: Blood Updated: 06/13/25 1631     HS Troponin T 8 ng/L      Troponin T Numeric Delta 0 ng/L     Narrative:      High  Sensitive Troponin T Reference Range:  <14.0 ng/L- Negative Female for AMI  <22.0 ng/L- Negative Male for AMI  >=14 - Abnormal Female indicating possible myocardial injury.  >=22 - Abnormal Male indicating possible myocardial injury.   Clinicians would have to utilize clinical acumen, EKG, Troponin, and serial changes to determine if it is an Acute Myocardial Infarction or myocardial injury due to an underlying chronic condition.         Comprehensive Metabolic Panel [[id number]] Collected: 06/13/25 1342    Specimen: Blood from Arm, Right Updated: 06/13/25 1419     Glucose 92 mg/dL      BUN 13.1 mg/dL      Creatinine 0.84 mg/dL      Sodium 136 mmol/L      Potassium 3.8 mmol/L      Chloride 101 mmol/L      CO2 22.0 mmol/L      Calcium 9.4 mg/dL      Total Protein 7.0 g/dL      Albumin 4.0 g/dL      ALT (SGPT) 14 U/L      AST (SGOT) 17 U/L      Alkaline Phosphatase 108 U/L      Total Bilirubin 0.4 mg/dL      Globulin 3.0 gm/dL      A/G Ratio 1.3 g/dL      BUN/Creatinine Ratio 15.6     Anion Gap 13.0 mmol/L      eGFR 75.8 mL/min/1.73     Narrative:      GFR Categories in Chronic Kidney Disease (CKD)              GFR Category          GFR (mL/min/1.73)    Interpretation  G1                    90 or greater        Normal or high (1)  G2                    60-89                Mild decrease (1)  G3a                   45-59                Mild to moderate decrease  G3b                   30-44                Moderate to severe decrease  G4                    15-29                Severe decrease  G5                    14 or less           Kidney failure    (1)In the absence of evidence of kidney disease, neither GFR category G1 or G2 fulfill the criteria for CKD.    eGFR calculation 2021 CKD-EPI creatinine equation, which does not include race as a factor    Lactic Acid, Plasma [[id number]]  (Normal) Collected: 06/13/25 1342    Specimen: Blood from Arm, Right Updated: 06/13/25 1418     Lactate 1.0 mmol/L     BNP [[id number]]   (Normal) Collected: 06/13/25 1342    Specimen: Blood from Arm, Right Updated: 06/13/25 1416     proBNP <36.0 pg/mL     Narrative:      This assay is used as an aid in the diagnosis of individuals suspected of having heart failure. It can be used as an aid in the diagnosis of acute decompensated heart failure (ADHF) in patients presenting with signs and symptoms of ADHF to the emergency department (ED). In addition, NT-proBNP of <300 pg/mL indicates ADHF is not likely.    Age Range Result Interpretation  NT-proBNP Concentration (pg/mL:      <50             Positive            >450                   Gray                 300-450                    Negative             <300    50-75           Positive            >900                  Gray                300-900                  Negative            <300      >75             Positive            >1800                  Gray                300-1800                  Negative            <300    Magnesium [[id number]]  (Normal) Collected: 06/13/25 1342    Specimen: Blood from Arm, Right Updated: 06/13/25 1416     Magnesium 1.9 mg/dL     High Sensitivity Troponin T [[id number]]  (Normal) Collected: 06/13/25 1342    Specimen: Blood from Arm, Right Updated: 06/13/25 1416     HS Troponin T 8 ng/L     Narrative:      High Sensitive Troponin T Reference Range:  <14.0 ng/L- Negative Female for AMI  <22.0 ng/L- Negative Male for AMI  >=14 - Abnormal Female indicating possible myocardial injury.  >=22 - Abnormal Male indicating possible myocardial injury.   Clinicians would have to utilize clinical acumen, EKG, Troponin, and serial changes to determine if it is an Acute Myocardial Infarction or myocardial injury due to an underlying chronic condition.         Lipase [[id number]]  (Normal) Collected: 06/13/25 1342    Specimen: Blood from Arm, Right Updated: 06/13/25 1414     Lipase 33 U/L     Protime-INR [[id number]]  (Normal) Collected: 06/13/25 1342    Specimen: Blood from Arm, Right  "Updated: 06/13/25 1406     Protime 13.4 Seconds      INR 0.97    aPTT [[id number]]  (Normal) Collected: 06/13/25 1342    Specimen: Blood from Arm, Right Updated: 06/13/25 1406     PTT 28.9 seconds     Narrative:      PTT = The equivalent PTT values for the therapeutic range of heparin levels at 0.3 to 0.7 U/ml are 77 - 99 seconds.    CBC & Differential [[id number]]  (Abnormal) Collected: 06/13/25 1342    Specimen: Blood from Arm, Right Updated: 06/13/25 1356    Narrative:      The following orders were created for panel order CBC & Differential.  Procedure                               Abnormality         Status                     ---------                               -----------         ------                     CBC Auto Differential[[id number]]        Abnormal            Final result                 Please view results for these tests on the individual orders.    CBC Auto Differential [[id number]]  (Abnormal) Collected: 06/13/25 1342    Specimen: Blood from Arm, Right Updated: 06/13/25 1356     WBC 10.03 10*3/mm3      RBC 4.52 10*6/mm3      Hemoglobin 13.1 g/dL      Hematocrit 39.2 %      MCV 86.7 fL      MCH 29.0 pg      MCHC 33.4 g/dL      RDW 13.2 %      RDW-SD 41.6 fl      MPV 10.5 fL      Platelets 251 10*3/mm3      Neutrophil % 62.9 %      Lymphocyte % 19.8 %      Monocyte % 12.5 %      Eosinophil % 3.8 %      Basophil % 0.4 %      Immature Grans % 0.6 %      Neutrophils, Absolute 6.31 10*3/mm3      Lymphocytes, Absolute 1.99 10*3/mm3      Monocytes, Absolute 1.25 10*3/mm3      Eosinophils, Absolute 0.38 10*3/mm3      Basophils, Absolute 0.04 10*3/mm3      Immature Grans, Absolute 0.06 10*3/mm3      nRBC 0.0 /100 WBC                 Echo EF Estimated  No results found for: \"ECHOEFEST\"      Cath Ejection Fraction Quantitative  No results found for: \"CATHEF\"        Medication Review: yes  Current Facility-Administered Medications   Medication Dose Route Frequency Provider Last Rate Last Admin    acetaminophen " (TYLENOL) tablet 650 mg  650 mg Oral Q4H PRN Manda Lima APRN        Or    acetaminophen (TYLENOL) 160 MG/5ML oral solution 650 mg  650 mg Oral Q4H PRN Manda Lima APRN        Or    acetaminophen (TYLENOL) suppository 650 mg  650 mg Rectal Q4H PRN Manda Lima L, APRN        atorvastatin (LIPITOR) tablet 20 mg  20 mg Oral Daily Manda Lima, APRN   20 mg at 06/16/25 0824    cetirizine (zyrTEC) tablet 10 mg  10 mg Oral Daily Manda Lima, APRN   10 mg at 06/16/25 0824    FLUoxetine (PROzac) capsule 20 mg  20 mg Oral Daily Manda Lima, APRN   20 mg at 06/16/25 0824    fluticasone (FLONASE) 50 MCG/ACT nasal spray 2 spray  2 spray Nasal Daily Manda Lima APRN        lisinopril (PRINIVIL,ZESTRIL) tablet 20 mg  20 mg Oral Q24H Manda Lima, APRN   20 mg at 06/16/25 0824    And    hydroCHLOROthiazide tablet 25 mg  25 mg Oral Q24H Manda Lima, APRN   25 mg at 06/16/25 0824    pantoprazole (PROTONIX) EC tablet 40 mg  40 mg Oral Q AM Manda Lima, APRN   40 mg at 06/16/25 0617    promethazine (PHENERGAN) tablet 25 mg  25 mg Oral Q8H PRN Taz Romo MD   25 mg at 06/15/25 1740    sodium chloride 0.9 % flush 10 mL  10 mL Intravenous Q12H Manda Lima, APRN   10 mL at 06/16/25 0824    sodium chloride 0.9 % flush 10 mL  10 mL Intravenous PRN Manda Lima APRN        sodium chloride 0.9 % flush 10 mL  10 mL Intravenous Q12H Kishor Brody MD   10 mL at 06/16/25 0824    sodium chloride 0.9 % flush 10 mL  10 mL Intravenous PRN Kishor Brody MD        sodium chloride 0.9 % infusion 40 mL  40 mL Intravenous PRN Manda Lima APRN        sodium chloride 0.9 % infusion 40 mL  40 mL Intravenous PRN Kishor Brody MD             Assessment & Plan     Complete heart block: Plans for pacemaker today  Chest pain: Reassuring ischemic evaluation    -Patient is n.p.o. for leadless pacemaker implant this afternoon with electrophysiology  - No general cardiology interventions at this  time  - Discharge disposition per electrophysiology    Further orders per Dr. Felotn      Electronically signed by Juan Panor, JAIME, 06/16/25, 10:53 AM CDT.

## 2025-06-17 VITALS
BODY MASS INDEX: 27.55 KG/M2 | TEMPERATURE: 97.7 F | RESPIRATION RATE: 16 BRPM | DIASTOLIC BLOOD PRESSURE: 73 MMHG | HEART RATE: 72 BPM | HEIGHT: 66 IN | OXYGEN SATURATION: 98 % | SYSTOLIC BLOOD PRESSURE: 125 MMHG | WEIGHT: 171.4 LBS

## 2025-06-17 PROBLEM — Z95.0 S/P PLACEMENT OF LEADLESS CARDIAC PACEMAKER: Status: ACTIVE | Noted: 2025-06-17

## 2025-06-17 PROCEDURE — 99024 POSTOP FOLLOW-UP VISIT: CPT | Performed by: STUDENT IN AN ORGANIZED HEALTH CARE EDUCATION/TRAINING PROGRAM

## 2025-06-17 PROCEDURE — 99024 POSTOP FOLLOW-UP VISIT: CPT | Performed by: NURSE PRACTITIONER

## 2025-06-17 RX ADMIN — CETIRIZINE HYDROCHLORIDE 10 MG: 10 TABLET, FILM COATED ORAL at 08:43

## 2025-06-17 RX ADMIN — FLUOXETINE HYDROCHLORIDE 20 MG: 20 CAPSULE ORAL at 08:43

## 2025-06-17 RX ADMIN — ATORVASTATIN CALCIUM 20 MG: 10 TABLET, FILM COATED ORAL at 08:43

## 2025-06-17 RX ADMIN — Medication 10 ML: at 08:43

## 2025-06-17 RX ADMIN — LISINOPRIL 20 MG: 20 TABLET ORAL at 08:43

## 2025-06-17 RX ADMIN — PANTOPRAZOLE SODIUM 40 MG: 40 TABLET, DELAYED RELEASE ORAL at 06:43

## 2025-06-17 RX ADMIN — HYDROCHLOROTHIAZIDE 25 MG: 25 TABLET ORAL at 08:43

## 2025-06-17 NOTE — PROGRESS NOTES
"Chief Complaint  Micra implant    Subjective        History of Present Illness    Patient ID:  Michelle Berg is a 70 y.o. female with past medical history as above who was underwent Micra implant yesterday.    Interval history:  Did well overnight.  Denies bleeding from groin site.  Suture removed this morning without complication.  Denies chest pain.    Objective   Vital Signs:  /73 (BP Location: Left arm, Patient Position: Lying)   Pulse 72   Temp 97.7 °F (36.5 °C) (Oral)   Resp 16   Ht 167.6 cm (66\")   Wt 77.7 kg (171 lb 6.4 oz)   SpO2 98%   BMI 27.66 kg/m²   Estimated body mass index is 27.66 kg/m² as calculated from the following:    Height as of this encounter: 167.6 cm (66\").    Weight as of this encounter: 77.7 kg (171 lb 6.4 oz).      Physical Exam  Vitals reviewed.   Constitutional:       General: She is not in acute distress.     Appearance: Normal appearance.   Cardiovascular:      Pulses: Normal pulses.      Comments: Groin site is symmetric without evidence of active bleeding, hematoma formation, swelling, or tenderness.  Pulmonary:      Effort: Pulmonary effort is normal. No respiratory distress.   Musculoskeletal:         General: No swelling.   Skin:     General: Skin is warm.   Neurological:      Mental Status: She is alert. Mental status is at baseline.          Result Review :  The following data was reviewed by: Kishor Brody MD on 06/13/2025:    Inpatient telemetry was reviewed without evidence of of significant tachy or bradyarrhythmias.  Appropriate Micra pacing.             Assessment and Plan   Problems:  Symptomatic bradycardia 2/2 intermittent complete heart block  Micra implant    Michelle Berg is a 70 y.o. female with problems as above who underwent Micra implant yesterday.  Device is functioning well and there is no evidence of acute complications.  At this time, she appears well enough for discharge home without need for further interventions.    Plan:  - No " medication changes at this time  - No lifting >10lbs for 1 week  - Can take a shower 24h following implant time  - No baths or swimming for 1 week  - Follow up in clinic as scheduled  - Advised to call me should she have any new symptoms of loss of consciousness, severe shortness of breath, or severe chest pain         Part of this note may be an electronic transcription/translation of spoken language to printed text using the Dragon Dictation System.

## 2025-06-17 NOTE — DISCHARGE SUMMARY
"Date of Discharge:  6/17/2025    Discharge Diagnosis:   Complete heart block    Chest pain, atypical    Encounter for examination for normal comparison and control in clinical research program    S/P placement of leadless cardiac pacemaker      Presenting Problem/History of Present Illness  Complete heart block [I44.2]    HPI per H&P per Dr. Romo on 6/13:  Michelle Berg is a 70 y.o. yo female with history of a recent abnormal Holter monitor who presents today for evaluation of episodes of chest discomfort and presyncope.  The patient had been having episodes of \"dizziness\" and chest discomfort.  A stress echo  and Holter monitor were ordered.  The stress echo was later canceled for unknown reasons.  A 7-day monitor revealed the basic rhythm to be sinus with 147 sinus pauses of up to 5.1 seconds in length and 327 episodes of high degree AV block (second-degree type II and third-degree).  The patient was given a cardiology appointment later this month.  Meanwhile, she had also been worked up for gallbladder disease and though her gallbladder ultrasound was unremarkable she had a gallbladder ejection fraction of 6% on a HIDA scan.  There was evidence of biliary dyskinesia.  She has been evaluated by general surgery with cholecystectomy recommended.     In this setting, the patient has continued to have lightheaded episodes frequently.  She has not had any walt syncope though on one occasion she thought she might actually blackout completely.  She also has had some chest discomfort which occurs with exertion and at rest and lasts for variable amounts of time generally for a few minutes.  This is discomfort in the center of the chest with radiation to the upper mid back area at times.  She has also been experiencing abdominal discomfort and nausea which she attributes to her gallbladder issue.  The patient called the office this morning with these complaints and was advised to come to the emergency room for " further evaluation.     The patient is  and is a non-smoker.  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  These are called safety boots.  There is a history of coronary artery disease in her father.  She does have a history of hypertension and hyperlipidemia.       Hospital Course  Patient was admitted and monitored over the weekend. She underwent a cardiac PET stress test due to complaints of chest pain which was normal. She reported issues with a cough over the last few days and tested positive for Rhinovirus. EP was consulted for consideration of pacemaker implant. She underwent successful placement of a Micra leadless pacemaker yesterday per Dr. Brody. She tolerated the procedure well. Device interrogation today revealed normal function. CXR completed yesterday was normal. She is felt safe for discharge.     Procedures Performed  Procedure(s):  Leadless pacemaker implant       Review of Systems   Constitutional:  Negative for diaphoresis, fatigue and unexpected weight change.   Respiratory:  Negative for chest tightness, shortness of breath and wheezing.    Cardiovascular:  Negative for chest pain, palpitations and leg swelling.   Gastrointestinal:  Negative for diarrhea, nausea and vomiting.   Neurological:  Negative for dizziness, syncope and light-headedness.   All other systems reviewed and are negative.      Consults:   Consults       Date and Time Order Name Status Description    6/14/2025  7:57 AM Inpatient Cardiac Electrophysiology Consult Completed             Pertinent Test Results:  Lab Results (last 72 hours)       Procedure Component Value Units Date/Time    Basic Metabolic Panel [[id number]]  (Abnormal) Collected: 06/15/25 1741    Specimen: Blood Updated: 06/15/25 1827     Glucose 110 mg/dL      BUN 16.0 mg/dL      Creatinine 0.91 mg/dL      Sodium 139 mmol/L      Potassium 3.4 mmol/L      Chloride 101 mmol/L      CO2 22.0 mmol/L       Calcium 9.7 mg/dL      BUN/Creatinine Ratio 17.6     Anion Gap 16.0 mmol/L      eGFR 68.9 mL/min/1.73     Narrative:      GFR Categories in Chronic Kidney Disease (CKD)              GFR Category          GFR (mL/min/1.73)    Interpretation  G1                    90 or greater        Normal or high (1)  G2                    60-89                Mild decrease (1)  G3a                   45-59                Mild to moderate decrease  G3b                   30-44                Moderate to severe decrease  G4                    15-29                Severe decrease  G5                    14 or less           Kidney failure    (1)In the absence of evidence of kidney disease, neither GFR category G1 or G2 fulfill the criteria for CKD.    eGFR calculation 2021 CKD-EPI creatinine equation, which does not include race as a factor    Protime-INR [[id number]]  (Normal) Collected: 06/15/25 1741    Specimen: Blood Updated: 06/15/25 1816     Protime 13.4 Seconds      INR 0.97    CBC (No Diff) [[id number]]  (Normal) Collected: 06/15/25 1741    Specimen: Blood Updated: 06/15/25 1811     WBC 10.29 10*3/mm3      RBC 4.83 10*6/mm3      Hemoglobin 14.1 g/dL      Hematocrit 42.4 %      MCV 87.8 fL      MCH 29.2 pg      MCHC 33.3 g/dL      RDW 13.2 %      RDW-SD 42.8 fl      MPV 10.9 fL      Platelets 320 10*3/mm3     Respiratory Panel PCR w/COVID-19(SARS-CoV-2) [facility]A/[facility]OGER/[facility]IRA In-House, NP Swab in UTM/VTM, 2 HR TAT - Swab, Nasopharynx [[id number]]  (Abnormal) Collected: 06/15/25 1529    Specimen: Swab from Nasopharynx Updated: 06/15/25 1643     ADENOVIRUS, PCR Not Detected     Coronavirus 229E Not Detected     Coronavirus HKU1 Not Detected     Coronavirus NL63 Not Detected     Coronavirus OC43 Not Detected     COVID19 Not Detected     Human Metapneumovirus Not Detected     Human Rhinovirus/Enterovirus Detected     Influenza A PCR Not Detected     Influenza B PCR Not Detected     Parainfluenza Virus 1 Not Detected      "Parainfluenza Virus 2 Not Detected     Parainfluenza Virus 3 Not Detected     Parainfluenza Virus 4 Not Detected     RSV, PCR Not Detected     Bordetella pertussis pcr Not Detected     Bordetella parapertussis PCR Not Detected     Chlamydophila pneumoniae PCR Not Detected     Mycoplasma pneumo by PCR Not Detected    Narrative:      In the setting of a positive respiratory panel with a viral infection PLUS a negative procalcitonin without other underlying concern for bacterial infection, consider observing off antibiotics or discontinuation of antibiotics and continue supportive care. If the respiratory panel is positive for atypical bacterial infection (Bordetella pertussis, Chlamydophila pneumoniae, or Mycoplasma pneumoniae), consider antibiotic de-escalation to target atypical bacterial infection.            Ejection Fraction  Lab Results   Component Value Date    EF 74 06/14/2025       Echo EF Estimated  No results found for: \"ECHOEFEST\"    Nuclear Stress Ejection Fraction  No components found for: \"NUCEF\"    Cath Ejection Fraction Quantitative  No results found for: \"CATHEF\"    Condition on Discharge:  stable    Vital Signs  Temp:  [97.4 °F (36.3 °C)-98.3 °F (36.8 °C)] 97.7 °F (36.5 °C)  Heart Rate:  [66-82] 72  Resp:  [14-18] 16  BP: ()/(54-81) 125/73    Physical Exam:  Vitals reviewed.   Constitutional:       General: Not in acute distress.     Appearance: Healthy appearance. Well-developed. Not diaphoretic.   Eyes:      General: No scleral icterus.     Conjunctiva/sclera: Conjunctivae normal.      Pupils: Pupils are equal, round, and reactive to light.   HENT:      Head: Normocephalic.    Mouth/Throat:      Pharynx: No oropharyngeal exudate.   Neck:      Vascular: No JVR.   Pulmonary:      Effort: Pulmonary effort is normal. No respiratory distress.      Breath sounds: Normal breath sounds. No wheezing. No rhonchi. No rales.   Chest:      Chest wall: Not tender to palpatation.   Cardiovascular:      " Normal rate. Regular rhythm.   Pulses:     Intact distal pulses.   Edema:     Peripheral edema absent.   Abdominal:      General: Bowel sounds are normal. There is no distension.      Palpations: Abdomen is soft.      Tenderness: There is no abdominal tenderness.   Musculoskeletal: Normal range of motion.      Cervical back: Normal range of motion and neck supple. Skin:     General: Skin is warm and dry.      Coloration: Skin is not pale.      Findings: No erythema or rash.        Neurological:      Mental Status: Alert, oriented to person, place, and time and oriented to person, place and time.      Deep Tendon Reflexes: Reflexes are normal and symmetric.   Psychiatric:         Behavior: Behavior normal.         Discharge Disposition  Home or Self Care    Discharge Medications     Discharge Medications        Continue These Medications        Instructions Start Date   atorvastatin 20 MG tablet  Commonly known as: Lipitor   20 mg, Oral, Daily      cetirizine 10 MG tablet  Commonly known as: zyrTEC   10 mg, Oral, Daily      FLUoxetine 20 MG capsule  Commonly known as: PROzac   20 mg, Oral, Daily      fluticasone 50 MCG/ACT nasal spray  Commonly known as: FLONASE   2 sprays, Nasal, Daily      lisinopril-hydrochlorothiazide 20-25 MG per tablet  Commonly known as: PRINZIDE,ZESTORETIC   1 tablet, Oral, Every Morning      omeprazole 20 MG capsule  Commonly known as: priLOSEC   20 mg, Oral, 2 Times Daily      promethazine 25 MG tablet  Commonly known as: PHENERGAN   25 mg, Oral, Every 8 Hours PRN      vitamin D 1.25 MG (26505 UT) capsule capsule  Commonly known as: ERGOCALCIFEROL   50,000 Units, Oral, Weekly               Discharge Diet: regular    Activity at Discharge: No lifting >10lbs for 1 week  - Can take a shower 24h following implant time  - No baths or swimming for 1 week    Follow-up Appointments  Future Appointments   Date Time Provider Department Center   6/23/2025 10:45 AM Gail Vegas APRN [facility]  PAD   6/24/2025 10:00 AM Taz Romo MD [facility]  PAD   6/25/2025  3:00 PM [facility] PAD PAT 32 RM 3 BH PAD PAT PAD   7/1/2025  9:30 AM [facility] PAD DEVICE CHECK [facility] CD PAD PAD   7/31/2025 10:30 AM [place] [facility] PAD DEVICE CHECK [facility] CD PAD PAD   11/17/2025  9:30 AM Gail Vegas APRN [facility] PAD         Plan:   -d/c home today.   -ok to use OTC cough medication for cough.     Recommendations per EP as follows:  - No medication changes at this time  - No lifting >10lbs for 1 week  - Can take a shower 24h following implant time  - No baths or swimming for 1 week  - Follow up in clinic as scheduled  - Advised to call me should she have any new symptoms of loss of consciousness, severe shortness of breath, or severe chest pain    She is currently scheduled to see Dr. Romo next week however she does not need to see general cardiology and EP therefore will cancel and have her Follow up with JAIME Lopez or Jerome SANDOVAL on 7/31. She is to keep her device interrogation appointment on 7/1.          Electronically signed by JAIME Martinez, 06/17/25, 10:44 AM CDT.     Time spent on discharge: 25 minutes

## 2025-06-17 NOTE — PLAN OF CARE
Goal Outcome Evaluation:  Plan of Care Reviewed With: patient        Progress: improving  Outcome Evaluation: Patient had leadless pacemaker insertion on 6/16/25. A&O4. No complaints of nausea on this shift. Will continue to monitor. Patient got up and went to BR at 2215 to void. R groin site has had moderate drainage throughout shift. No new drainage since 2230. Groin soft, and PPP. Patient complained of minimal pain at site. BP, soft but stable. NS-65-80 per tele. Call light within reach, safety maintained.

## 2025-06-18 ENCOUNTER — APPOINTMENT (OUTPATIENT)
Dept: GENERAL RADIOLOGY | Facility: HOSPITAL | Age: 70
End: 2025-06-18
Payer: MEDICARE

## 2025-06-18 ENCOUNTER — READMISSION MANAGEMENT (OUTPATIENT)
Dept: CALL CENTER | Facility: HOSPITAL | Age: 70
End: 2025-06-18
Payer: MEDICARE

## 2025-06-18 ENCOUNTER — TRANSITIONAL CARE MANAGEMENT TELEPHONE ENCOUNTER (OUTPATIENT)
Dept: CALL CENTER | Facility: HOSPITAL | Age: 70
End: 2025-06-18
Payer: MEDICARE

## 2025-06-18 ENCOUNTER — NURSE TRIAGE (OUTPATIENT)
Dept: CALL CENTER | Facility: HOSPITAL | Age: 70
End: 2025-06-18
Payer: MEDICARE

## 2025-06-18 ENCOUNTER — APPOINTMENT (OUTPATIENT)
Dept: CT IMAGING | Facility: HOSPITAL | Age: 70
End: 2025-06-18
Payer: MEDICARE

## 2025-06-18 ENCOUNTER — HOSPITAL ENCOUNTER (EMERGENCY)
Facility: HOSPITAL | Age: 70
Discharge: HOME | End: 2025-06-18
Attending: FAMILY MEDICINE | Admitting: FAMILY MEDICINE
Payer: MEDICARE

## 2025-06-18 VITALS
WEIGHT: 172.5 LBS | SYSTOLIC BLOOD PRESSURE: 129 MMHG | HEIGHT: 66 IN | RESPIRATION RATE: 16 BRPM | DIASTOLIC BLOOD PRESSURE: 74 MMHG | HEART RATE: 90 BPM | OXYGEN SATURATION: 98 % | BODY MASS INDEX: 27.72 KG/M2 | TEMPERATURE: 98.5 F

## 2025-06-18 DIAGNOSIS — R06.02 SHORTNESS OF BREATH: Primary | ICD-10-CM

## 2025-06-18 DIAGNOSIS — R07.9 CHEST PAIN, UNSPECIFIED TYPE: ICD-10-CM

## 2025-06-18 LAB
ALBUMIN SERPL-MCNC: 4.1 G/DL (ref 3.5–5.2)
ALBUMIN/GLOB SERPL: 1.4 G/DL
ALP SERPL-CCNC: 106 U/L (ref 39–117)
ALT SERPL W P-5'-P-CCNC: 9 U/L (ref 1–33)
ANION GAP SERPL CALCULATED.3IONS-SCNC: 14 MMOL/L (ref 5–15)
APTT PPP: 29.4 SECONDS (ref 24.5–36)
AST SERPL-CCNC: 18 U/L (ref 1–32)
BASOPHILS # BLD AUTO: 0.04 10*3/MM3 (ref 0–0.2)
BASOPHILS NFR BLD AUTO: 0.4 % (ref 0–1.5)
BILIRUB SERPL-MCNC: 0.4 MG/DL (ref 0–1.2)
BUN SERPL-MCNC: 19 MG/DL (ref 8–23)
BUN/CREAT SERPL: 20.4 (ref 7–25)
CALCIUM SPEC-SCNC: 9.3 MG/DL (ref 8.6–10.5)
CHLORIDE SERPL-SCNC: 101 MMOL/L (ref 98–107)
CO2 SERPL-SCNC: 20 MMOL/L (ref 22–29)
CREAT SERPL-MCNC: 0.93 MG/DL (ref 0.57–1)
DEPRECATED RDW RBC AUTO: 40.6 FL (ref 37–54)
EGFRCR SERPLBLD CKD-EPI 2021: 67.1 ML/MIN/1.73
EOSINOPHIL # BLD AUTO: 0.25 10*3/MM3 (ref 0–0.4)
EOSINOPHIL NFR BLD AUTO: 2.5 % (ref 0.3–6.2)
ERYTHROCYTE [DISTWIDTH] IN BLOOD BY AUTOMATED COUNT: 12.9 % (ref 12.3–15.4)
GEN 5 1HR TROPONIN T REFLEX: 50 NG/L
GLOBULIN UR ELPH-MCNC: 3 GM/DL
GLUCOSE SERPL-MCNC: 93 MG/DL (ref 65–99)
HCT VFR BLD AUTO: 40.2 % (ref 34–46.6)
HGB BLD-MCNC: 13.6 G/DL (ref 12–15.9)
IMM GRANULOCYTES # BLD AUTO: 0.06 10*3/MM3 (ref 0–0.05)
IMM GRANULOCYTES NFR BLD AUTO: 0.6 % (ref 0–0.5)
INR PPP: 0.99 (ref 0.91–1.09)
LIPASE SERPL-CCNC: 31 U/L (ref 13–60)
LYMPHOCYTES # BLD AUTO: 1.78 10*3/MM3 (ref 0.7–3.1)
LYMPHOCYTES NFR BLD AUTO: 17.5 % (ref 19.6–45.3)
MAGNESIUM SERPL-MCNC: 1.9 MG/DL (ref 1.6–2.4)
MCH RBC QN AUTO: 29.2 PG (ref 26.6–33)
MCHC RBC AUTO-ENTMCNC: 33.8 G/DL (ref 31.5–35.7)
MCV RBC AUTO: 86.5 FL (ref 79–97)
MONOCYTES # BLD AUTO: 0.84 10*3/MM3 (ref 0.1–0.9)
MONOCYTES NFR BLD AUTO: 8.3 % (ref 5–12)
NEUTROPHILS NFR BLD AUTO: 7.2 10*3/MM3 (ref 1.7–7)
NEUTROPHILS NFR BLD AUTO: 70.7 % (ref 42.7–76)
NRBC BLD AUTO-RTO: 0 /100 WBC (ref 0–0.2)
NT-PROBNP SERPL-MCNC: 118.7 PG/ML (ref 0–900)
PLATELET # BLD AUTO: 288 10*3/MM3 (ref 140–450)
PMV BLD AUTO: 10.7 FL (ref 6–12)
POTASSIUM SERPL-SCNC: 3.9 MMOL/L (ref 3.5–5.2)
PROT SERPL-MCNC: 7.1 G/DL (ref 6–8.5)
PROTHROMBIN TIME: 13.6 SECONDS (ref 11.8–14.8)
QT INTERVAL: 436 MS
QTC INTERVAL: 470 MS
RBC # BLD AUTO: 4.65 10*6/MM3 (ref 3.77–5.28)
SODIUM SERPL-SCNC: 135 MMOL/L (ref 136–145)
TROPONIN T % DELTA: -9
TROPONIN T NUMERIC DELTA: -5 NG/L
TROPONIN T SERPL HS-MCNC: 55 NG/L
WBC NRBC COR # BLD AUTO: 10.17 10*3/MM3 (ref 3.4–10.8)

## 2025-06-18 PROCEDURE — 83690 ASSAY OF LIPASE: CPT | Performed by: FAMILY MEDICINE

## 2025-06-18 PROCEDURE — 85610 PROTHROMBIN TIME: CPT | Performed by: FAMILY MEDICINE

## 2025-06-18 PROCEDURE — 93005 ELECTROCARDIOGRAM TRACING: CPT | Performed by: FAMILY MEDICINE

## 2025-06-18 PROCEDURE — 96374 THER/PROPH/DIAG INJ IV PUSH: CPT

## 2025-06-18 PROCEDURE — 71045 X-RAY EXAM CHEST 1 VIEW: CPT

## 2025-06-18 PROCEDURE — 80053 COMPREHEN METABOLIC PANEL: CPT | Performed by: FAMILY MEDICINE

## 2025-06-18 PROCEDURE — 93010 ELECTROCARDIOGRAM REPORT: CPT | Performed by: STUDENT IN AN ORGANIZED HEALTH CARE EDUCATION/TRAINING PROGRAM

## 2025-06-18 PROCEDURE — 25010000002 ONDANSETRON PER 1 MG: Performed by: FAMILY MEDICINE

## 2025-06-18 PROCEDURE — 84484 ASSAY OF TROPONIN QUANT: CPT | Performed by: FAMILY MEDICINE

## 2025-06-18 PROCEDURE — 85730 THROMBOPLASTIN TIME PARTIAL: CPT | Performed by: FAMILY MEDICINE

## 2025-06-18 PROCEDURE — 93005 ELECTROCARDIOGRAM TRACING: CPT

## 2025-06-18 PROCEDURE — 25510000001 IOPAMIDOL PER 1 ML: Performed by: FAMILY MEDICINE

## 2025-06-18 PROCEDURE — 83735 ASSAY OF MAGNESIUM: CPT | Performed by: FAMILY MEDICINE

## 2025-06-18 PROCEDURE — 36415 COLL VENOUS BLD VENIPUNCTURE: CPT

## 2025-06-18 PROCEDURE — 99285 EMERGENCY DEPT VISIT HI MDM: CPT | Performed by: FAMILY MEDICINE

## 2025-06-18 PROCEDURE — 83880 ASSAY OF NATRIURETIC PEPTIDE: CPT | Performed by: FAMILY MEDICINE

## 2025-06-18 PROCEDURE — 85025 COMPLETE CBC W/AUTO DIFF WBC: CPT | Performed by: FAMILY MEDICINE

## 2025-06-18 PROCEDURE — 71275 CT ANGIOGRAPHY CHEST: CPT

## 2025-06-18 RX ORDER — SODIUM CHLORIDE 0.9 % (FLUSH) 0.9 %
10 SYRINGE (ML) INJECTION AS NEEDED
Status: DISCONTINUED | OUTPATIENT
Start: 2025-06-18 | End: 2025-06-18 | Stop reason: HOSPADM

## 2025-06-18 RX ORDER — ONDANSETRON 4 MG/1
4 TABLET, ORALLY DISINTEGRATING ORAL EVERY 8 HOURS PRN
Qty: 16 TABLET | Refills: 0 | Status: SHIPPED | OUTPATIENT
Start: 2025-06-18 | End: 2025-06-23 | Stop reason: SDUPTHER

## 2025-06-18 RX ORDER — IOPAMIDOL 755 MG/ML
100 INJECTION, SOLUTION INTRAVASCULAR
Status: COMPLETED | OUTPATIENT
Start: 2025-06-18 | End: 2025-06-18

## 2025-06-18 RX ORDER — ONDANSETRON 2 MG/ML
4 INJECTION INTRAMUSCULAR; INTRAVENOUS ONCE
Status: COMPLETED | OUTPATIENT
Start: 2025-06-18 | End: 2025-06-18

## 2025-06-18 RX ADMIN — IOPAMIDOL 68 ML: 755 INJECTION, SOLUTION INTRAVENOUS at 14:36

## 2025-06-18 RX ADMIN — ONDANSETRON 4 MG: 2 INJECTION INTRAMUSCULAR; INTRAVENOUS at 15:39

## 2025-06-18 NOTE — OUTREACH NOTE
Call Center TCM Note      Flowsheet Row Responses   [facility] facility patient discharged from? [place]   Does the patient have one of the following disease processes/diagnoses(primary or secondary)? General Surgery   TCM attempt successful? No   Unsuccessful attempts Attempt 1  [Patient currently in ED]            SEVEN Liang Registered Nurse    6/18/2025, 13:45 CDT

## 2025-06-18 NOTE — ED PROVIDER NOTES
Subjective   History of Present Illness  62-year-old female with a recent pacemaker placement.  She just discharged from the hospital couple days ago she states.  She states that she woke up today feeling short of breath.  She is also having some pain in the epigastric region.  She states that she is unsure if it is her gallbladder.  She has no vomiting.  No diarrhea.  No bloody stools.  She denies any fevers or chills.  Patient denies any other symptoms at this time.        Review of Systems   All other systems reviewed and are negative.      Past Medical History:   Diagnosis Date    Allergies     Anxiety     Depression     Hypertension        No Known Allergies    Past Surgical History:   Procedure Laterality Date     SECTION       SECTION      PACEMAKER IMPLANTATION N/A 2025    Procedure: Leadless pacemaker implant;  Surgeon: Kishor Brody MD;  Location: [facility] INVASIVE LOCATION;  Service: Cardiovascular;  Laterality: N/A;       Family History   Problem Relation Age of Onset    Heart disease Father        Social History     Socioeconomic History    Marital status:    Tobacco Use    Smoking status: Never     Passive exposure: Never    Smokeless tobacco: Never   Vaping Use    Vaping status: Never Used   Substance and Sexual Activity    Alcohol use: Never    Drug use: Never    Sexual activity: Defer           Objective   Physical Exam  Vitals and nursing note reviewed.   Constitutional:       Appearance: She is well-developed.   HENT:      Head: Normocephalic and atraumatic.   Eyes:      Extraocular Movements: Extraocular movements intact.      Pupils: Pupils are equal, round, and reactive to light.   Cardiovascular:      Rate and Rhythm: Normal rate and regular rhythm.      Heart sounds: Normal heart sounds.   Pulmonary:      Effort: Pulmonary effort is normal.      Breath sounds: Normal breath sounds.   Abdominal:      General: Bowel sounds are normal.      Palpations:  Abdomen is soft.   Skin:     General: Skin is warm and dry.   Neurological:      General: No focal deficit present.      Mental Status: She is alert and oriented to person, place, and time.   Psychiatric:         Mood and Affect: Mood normal.         Behavior: Behavior normal.         Procedures           ED Course                                                     Lab Results (last 24 hours)       Procedure Component Value Units Date/Time    CBC & Differential [[id number]]  (Abnormal) Collected: 06/18/25 1325    Specimen: Blood from Arm, Right Updated: 06/18/25 1338    Narrative:      The following orders were created for panel order CBC & Differential.  Procedure                               Abnormality         Status                     ---------                               -----------         ------                     CBC Auto Differential[[id number]]        Abnormal            Final result                 Please view results for these tests on the individual orders.    Comprehensive Metabolic Panel [[id number]]  (Abnormal) Collected: 06/18/25 1325    Specimen: Blood from Arm, Right Updated: 06/18/25 1402     Glucose 93 mg/dL      BUN 19.0 mg/dL      Creatinine 0.93 mg/dL      Sodium 135 mmol/L      Potassium 3.9 mmol/L      Comment: Slight hemolysis detected by analyzer. Result may be falsely elevated.        Chloride 101 mmol/L      CO2 20.0 mmol/L      Calcium 9.3 mg/dL      Total Protein 7.1 g/dL      Albumin 4.1 g/dL      ALT (SGPT) 9 U/L      AST (SGOT) 18 U/L      Alkaline Phosphatase 106 U/L      Total Bilirubin 0.4 mg/dL      Globulin 3.0 gm/dL      A/G Ratio 1.4 g/dL      BUN/Creatinine Ratio 20.4     Anion Gap 14.0 mmol/L      eGFR 67.1 mL/min/1.73     Narrative:      GFR Categories in Chronic Kidney Disease (CKD)              GFR Category          GFR (mL/min/1.73)    Interpretation  G1                    90 or greater        Normal or high (1)  G2                    60-89                Mild  decrease (1)  G3a                   45-59                Mild to moderate decrease  G3b                   30-44                Moderate to severe decrease  G4                    15-29                Severe decrease  G5                    14 or less           Kidney failure    (1)In the absence of evidence of kidney disease, neither GFR category G1 or G2 fulfill the criteria for CKD.    eGFR calculation 2021 CKD-EPI creatinine equation, which does not include race as a factor    Protime-INR [[id number]]  (Normal) Collected: 06/18/25 1325    Specimen: Blood from Arm, Right Updated: 06/18/25 1348     Protime 13.6 Seconds      INR 0.99    aPTT [[id number]]  (Normal) Collected: 06/18/25 1325    Specimen: Blood from Arm, Right Updated: 06/18/25 1348     PTT 29.4 seconds     Narrative:      PTT = The equivalent PTT values for the therapeutic range of heparin levels at 0.3 to 0.7 U/ml are 77 - 99 seconds.    Lipase [[id number]]  (Normal) Collected: 06/18/25 1325    Specimen: Blood from Arm, Right Updated: 06/18/25 1357     Lipase 31 U/L     BNP [[id number]]  (Normal) Collected: 06/18/25 1325    Specimen: Blood from Arm, Right Updated: 06/18/25 1358     proBNP 118.7 pg/mL     Narrative:      This assay is used as an aid in the diagnosis of individuals suspected of having heart failure. It can be used as an aid in the diagnosis of acute decompensated heart failure (ADHF) in patients presenting with signs and symptoms of ADHF to the emergency department (ED). In addition, NT-proBNP of <300 pg/mL indicates ADHF is not likely.    Age Range Result Interpretation  NT-proBNP Concentration (pg/mL:      <50             Positive            >450                   Gray                 300-450                    Negative             <300    50-75           Positive            >900                  Gray                300-900                  Negative            <300      >75             Positive            >1800                  Paige                 300-1800                  Negative            <300    High Sensitivity Troponin T [[id number]]  (Abnormal) Collected: 06/18/25 1325    Specimen: Blood from Arm, Right Updated: 06/18/25 1400     HS Troponin T 55 ng/L     Narrative:      High Sensitive Troponin T Reference Range:  <14.0 ng/L- Negative Female for AMI  <22.0 ng/L- Negative Male for AMI  >=14 - Abnormal Female indicating possible myocardial injury.  >=22 - Abnormal Male indicating possible myocardial injury.   Clinicians would have to utilize clinical acumen, EKG, Troponin, and serial changes to determine if it is an Acute Myocardial Infarction or myocardial injury due to an underlying chronic condition.         Magnesium [[id number]]  (Normal) Collected: 06/18/25 1325    Specimen: Blood from Arm, Right Updated: 06/18/25 1402     Magnesium 1.9 mg/dL     CBC Auto Differential [[id number]]  (Abnormal) Collected: 06/18/25 1325    Specimen: Blood from Arm, Right Updated: 06/18/25 1338     WBC 10.17 10*3/mm3      RBC 4.65 10*6/mm3      Hemoglobin 13.6 g/dL      Hematocrit 40.2 %      MCV 86.5 fL      MCH 29.2 pg      MCHC 33.8 g/dL      RDW 12.9 %      RDW-SD 40.6 fl      MPV 10.7 fL      Platelets 288 10*3/mm3      Neutrophil % 70.7 %      Lymphocyte % 17.5 %      Monocyte % 8.3 %      Eosinophil % 2.5 %      Basophil % 0.4 %      Immature Grans % 0.6 %      Neutrophils, Absolute 7.20 10*3/mm3      Lymphocytes, Absolute 1.78 10*3/mm3      Monocytes, Absolute 0.84 10*3/mm3      Eosinophils, Absolute 0.25 10*3/mm3      Basophils, Absolute 0.04 10*3/mm3      Immature Grans, Absolute 0.06 10*3/mm3      nRBC 0.0 /100 WBC     High Sensitivity Troponin T 1Hr [[id number]]  (Abnormal) Collected: 06/18/25 1443    Specimen: Blood Updated: 06/18/25 1510     HS Troponin T 50 ng/L      Troponin T Numeric Delta -5 ng/L      Troponin T % Delta -9    Narrative:      High Sensitive Troponin T Reference Range:  <14.0 ng/L- Negative Female for AMI  <22.0 ng/L- Negative Male  for AMI  >=14 - Abnormal Female indicating possible myocardial injury.  >=22 - Abnormal Male indicating possible myocardial injury.   Clinicians would have to utilize clinical acumen, EKG, Troponin, and serial changes to determine if it is an Acute Myocardial Infarction or myocardial injury due to an underlying chronic condition.               CT Angiogram Chest Pulmonary Embolism   Final Result      XR Chest 1 View   Final Result       1. No active disease in the chest.       This report was signed and finalized on 6/18/2025 2:24 PM by Wilman Bailey.            Medications   sodium chloride 0.9 % flush 10 mL (has no administration in time range)   ondansetron (ZOFRAN) injection 4 mg (has no administration in time range)   iopamidol (ISOVUE-370) 76 % injection 100 mL (68 mL Intravenous Given 6/18/25 1436)       Medical Decision Making  I had a discussion with the patient/family regarding diagnosis, diagnostic results, treatment plan, and medications.  The patient/family indicated understanding of these instructions.  I spent adequate time at the bedside prior to discharge necessary to discuss the aftercare instructions, giving patient education, providing explanations of the results of our evaluations/findings, and my decision making to assure that the patient/family understand the plan of care.  Time was allotted to answer questions at that time and throughout the ED course.  Emphasis was placed on timely follow-up after discharge.  I also discussed the potential for the development of an acute emergent condition requiring further evaluation, return to the ER, admission, or even surgical intervention. I discussed that we found nothing during the visit today indicating the need for further ER workup at this time, admission to the hospital, or the presence of an acute unstable medical condition.  I encouraged the patient to return to the emergency department immediately for ANY concerns, worsening, new  complaints, or if symptoms persist and unable to seek follow-up in a timely fashion.  The patient/family expressed understanding and agreement with this plan.      Problems Addressed:  Chest pain, unspecified type: complicated acute illness or injury  Shortness of breath: complicated acute illness or injury    Amount and/or Complexity of Data Reviewed  External Data Reviewed: labs, radiology, ECG and notes.  Labs: ordered. Decision-making details documented in ED Course.  Radiology: ordered. Decision-making details documented in ED Course.  ECG/medicine tests: ordered. Decision-making details documented in ED Course.    Risk  Prescription drug management.        Final diagnoses:   Shortness of breath   Chest pain, unspecified type       ED Disposition  ED Disposition       ED Disposition   Discharge    Condition   Stable    Comment   --               , Gail CALDWELL, APRN  [address]  [address]  [place] [id number] [phone]    Schedule an appointment as soon as possible for a visit       [facility] EMERGENCY DEPARTMENT  [address] [place] [id number] [phone]    As needed, If symptoms worsen         Medication List        New Prescriptions      ondansetron ODT 4 MG disintegrating tablet  Commonly known as: ZOFRAN-ODT  Place 1 tablet on the tongue Every 8 (Eight) Hours As Needed for Nausea or Vomiting.               Where to Get Your Medications        These medications were sent to [facility] [address] [place] [address] [address] [facility] - [phone] [facility] [phone] [address] [address]      Phone: [phone]   ondansetron ODT 4 MG disintegrating tablet            Rui Townsend MD  06/18/25 5312

## 2025-06-18 NOTE — OUTREACH NOTE
Call Center TCM Note      Flowsheet Row Responses   [facility] facility patient discharged from? [place]   Does the patient have one of the following disease processes/diagnoses(primary or secondary)? General Surgery   TCM attempt successful? No   Unsuccessful attempts Attempt 2  [Patient currently in ED]            SEVEN Liang Registered Nurse    6/18/2025, 14:31 CDT

## 2025-06-18 NOTE — OUTREACH NOTE
Prep Survey      Flowsheet Row Responses   [facility] patient discharged from? [place]   Is LACE score < 7 ? No   Eligibility [facility]   Date of Admission 06/13/25   Date of Discharge 06/17/25   Discharge Disposition Home or Self Care   Discharge diagnosis Complete heart block, s/p Leadless pacemaker implant   Does the patient have one of the following disease processes/diagnoses(primary or secondary)? General Surgery   Does the patient have Home health ordered? No   Is there a DME ordered? No   Prep survey completed? Yes            Darling KITCHEN - Registered Nurse

## 2025-06-19 ENCOUNTER — TRANSITIONAL CARE MANAGEMENT TELEPHONE ENCOUNTER (OUTPATIENT)
Dept: CALL CENTER | Facility: HOSPITAL | Age: 70
End: 2025-06-19
Payer: MEDICARE

## 2025-06-19 NOTE — OUTREACH NOTE
Call Center TCM Note      Flowsheet Row Responses   [facility] [place], operated by [facility] patient discharged from? [place]   Does the patient have one of the following disease processes/diagnoses(primary or secondary)? General Surgery   TCM attempt successful? Yes  [VR- spouse]   Call start time 1336   Call end time 1340   Discharge diagnosis Complete heart block, s/p Leadless pacemaker implant   Person spoke with today (if not patient) and relationship    Meds reviewed with patient/caregiver? Yes   Is the patient having any side effects they believe may be caused by any medication additions or changes? No   Does the patient have all medications related to this admission filled (includes all antibiotics, pain medications, etc.) Yes   Is the patient taking all medications as directed (includes completed medication regime)? Yes   Comments HOSP DC FU appt 6/23/25 1045 am   Does the patient have an appointment with their PCP within 7-14 days of discharge? Yes   Has home health visited the patient within 72 hours of discharge? N/A   Psychosocial issues? No   Did the patient receive a copy of their discharge instructions? Yes   Nursing interventions Reviewed instructions with patient   What is the patient's perception of their health status since discharge? Improving   Nursing interventions Nurse provided patient education   Is the patient /caregiver able to teach back basic post-op care? Lifting as instructed by MD in discharge instructions, Take showers only when approved by MD-sponge bathe until then, No tub bath, swimming, or hot tub until instructed by MD   Is the patient/caregiver able to teach back signs and symptoms of incisional infection? Increased redness, swelling or pain at the incisonal site, Increased drainage or bleeding, Incisional warmth, Pus or odor from incision, Fever   Is the patient/caregiver able to teach back steps to recovery at home? Eat a well-balance diet   Is the patient/caregiver able to teach back the  hierarchy of who to call/visit for symptoms/problems? PCP, Specialist, Home health nurse, Urgent Care, ED, 911 Yes   TCM call completed? Yes   Wrap up additional comments  states Pt is doing well now. He states that Pt went to ER yesterday due to s/s of blood clot but everything was negative. Pt aware of PCP appt 6/23/25   Call end time 1340            ERAN KC - Registered Nurse    6/19/2025, 13:40 CDT

## 2025-06-21 LAB
QT INTERVAL: 378 MS
QTC INTERVAL: 482 MS

## 2025-06-23 ENCOUNTER — OFFICE VISIT (OUTPATIENT)
Dept: FAMILY MEDICINE CLINIC | Facility: CLINIC | Age: 70
End: 2025-06-23
Payer: MEDICARE

## 2025-06-23 VITALS
HEIGHT: 66 IN | TEMPERATURE: 98 F | RESPIRATION RATE: 20 BRPM | BODY MASS INDEX: 27.58 KG/M2 | HEART RATE: 96 BPM | DIASTOLIC BLOOD PRESSURE: 79 MMHG | WEIGHT: 171.6 LBS | SYSTOLIC BLOOD PRESSURE: 136 MMHG | OXYGEN SATURATION: 97 %

## 2025-06-23 DIAGNOSIS — R94.8 ABNORMAL BILIARY HIDA SCAN: ICD-10-CM

## 2025-06-23 DIAGNOSIS — Z95.0 S/P PLACEMENT OF LEADLESS CARDIAC PACEMAKER: Primary | ICD-10-CM

## 2025-06-23 DIAGNOSIS — R10.13 INTERMITTENT EPIGASTRIC ABDOMINAL PAIN: ICD-10-CM

## 2025-06-23 DIAGNOSIS — R42 DIZZINESS: ICD-10-CM

## 2025-06-23 DIAGNOSIS — R07.89 ATYPICAL CHEST PAIN: ICD-10-CM

## 2025-06-23 DIAGNOSIS — R11.0 NAUSEA: ICD-10-CM

## 2025-06-23 DIAGNOSIS — I44.2 COMPLETE HEART BLOCK: ICD-10-CM

## 2025-06-23 PROCEDURE — 1159F MED LIST DOCD IN RCRD: CPT

## 2025-06-23 PROCEDURE — 1126F AMNT PAIN NOTED NONE PRSNT: CPT

## 2025-06-23 PROCEDURE — 3075F SYST BP GE 130 - 139MM HG: CPT

## 2025-06-23 PROCEDURE — 1111F DSCHRG MED/CURRENT MED MERGE: CPT

## 2025-06-23 PROCEDURE — 99495 TRANSJ CARE MGMT MOD F2F 14D: CPT

## 2025-06-23 PROCEDURE — 3078F DIAST BP <80 MM HG: CPT

## 2025-06-23 PROCEDURE — 1160F RVW MEDS BY RX/DR IN RCRD: CPT

## 2025-06-23 RX ORDER — ONDANSETRON 4 MG/1
4 TABLET, ORALLY DISINTEGRATING ORAL EVERY 8 HOURS PRN
Qty: 30 TABLET | Refills: 1 | Status: SHIPPED | OUTPATIENT
Start: 2025-06-23

## 2025-06-23 NOTE — PROGRESS NOTES
Transitional Care Follow Up Visit  Subjective     Michelle Berg is a 70 y.o. female who presents for a transitional care management visit.    Within 48 business hours after discharge our office contacted her via telephone to coordinate her care and needs.      I reviewed and discussed the details of that call along with the discharge summary, hospital problems, inpatient lab results, inpatient diagnostic studies, and consultation reports with Michelle.     Current outpatient and discharge medications have been reconciled for the patient.  Reviewed by: JAIME George        6/18/2025     4:23 AM   Date of TCM Phone Call   [facility]   Date of Admission 6/13/2025   Date of Discharge 6/17/2025   Discharge Disposition Home or Self Care     Risk for Readmission (LACE) Score: 8 (6/17/2025  5:00 AM)    History of Present Illness  Patient presents to office for HFU for pacemaker placement 06/16/2025.       Course During Hospital Stay:  4 days     The following portions of the patient's history were reviewed and updated as appropriate: allergies, current medications, past family history, past medical history, past social history, past surgical history, and problem list.    Review of Systems    SCOTT:    PHQ-2 Total Score:    PHQ-9 Total Score:      Objective   Physical Exam  Vitals and nursing note reviewed.   Constitutional:       General: She is not in acute distress.     Appearance: Normal appearance. She is not ill-appearing.   HENT:      Head: Normocephalic and atraumatic.      Right Ear: External ear normal.      Left Ear: External ear normal.      Nose: Nose normal.   Eyes:      Conjunctiva/sclera: Conjunctivae normal.   Cardiovascular:      Rate and Rhythm: Normal rate and regular rhythm.      Pulses: Normal pulses.      Heart sounds: Normal heart sounds.   Pulmonary:      Effort: Pulmonary effort is normal.      Breath sounds: Normal breath sounds.   Skin:     General: Skin is warm and  dry.   Neurological:      Mental Status: She is alert and oriented to person, place, and time. Mental status is at baseline.      GCS: GCS eye subscore is 4. GCS verbal subscore is 5. GCS motor subscore is 6.   Psychiatric:         Mood and Affect: Mood normal.         Behavior: Behavior normal.         Thought Content: Thought content normal.         Judgment: Judgment normal.       Assessment & Plan   Problem List Items Addressed This Visit       Complete heart block    S/P placement of leadless cardiac pacemaker - Primary     Other Visit Diagnoses         Atypical chest pain          Dizziness          Nausea        Relevant Medications    ondansetron ODT (ZOFRAN-ODT) 4 MG disintegrating tablet      Intermittent epigastric abdominal pain          Abnormal biliary HIDA scan               Data Reviewed:  CBC & Differential (06/18/2025 13:25)  Comprehensive Metabolic Panel (06/18/2025 13:25)  Hosp-Admission (Discharged) with Taz Romo MD (06/13/2025)   ED with Rui Townsend MD (06/18/2025)   Stress Test With PET Myocardial Perfusion (06/14/2025 11:58)   ECG 12 Lead Dyspnea (06/18/2025 12:56)   Assessment & Plan    1.  Hospital follow-up.  Patient is seen today for a hospital follow-up.  She was admitted and had a pacemaker placed as she was suffering from symptomatic bradycardia and was noted to have complete heart block, abnormal Holter results.  She was admitted from 6/13-6/17.  Pacemaker was placed successfully without complication, she also had a stress test with PET myocardial perfusion performed which was normal.  She has an appointment with cardiology for follow-up tomorrow, has an appointment to have her device checked on 7/1.  I encouraged her to keep these appointments.  She did recently have labs rechecked as she went to the ER on 6/18, no concerning abnormal findings.  Also performed a CTA of the chest without acute findings noted.  EKG was found to be consistent with her postop EKG.  She  reports improvement in symptoms since discharge, improved fatigue.  No new medications were started during her hospital admission.    2.  Epigastric pain and nausea.  Patient continues to experience intermittent epigastric abdominal pain and nausea; she has an appointment for a cholecystectomy next week.  She is requesting a refill on her Zofran which I am happy to provide.  Educated her on gallbladder diet which she has been partaking in.    Plan:  1.  Continue Zofran 4 mg 3 times daily as needed  2.  Gallbladder conscious diet  3.  Follow-up with cardiology and EP as previously scheduled  4.  Follow-up with us as needed for acute symptoms

## 2025-06-24 ENCOUNTER — OFFICE VISIT (OUTPATIENT)
Dept: CARDIOLOGY | Facility: CLINIC | Age: 70
End: 2025-06-24
Payer: MEDICARE

## 2025-06-24 VITALS
BODY MASS INDEX: 27.58 KG/M2 | HEIGHT: 66 IN | SYSTOLIC BLOOD PRESSURE: 132 MMHG | HEART RATE: 91 BPM | DIASTOLIC BLOOD PRESSURE: 83 MMHG | RESPIRATION RATE: 18 BRPM | WEIGHT: 171.6 LBS

## 2025-06-24 DIAGNOSIS — I10 PRIMARY HYPERTENSION: ICD-10-CM

## 2025-06-24 DIAGNOSIS — Z01.810 PREOPERATIVE CARDIOVASCULAR EXAMINATION: Primary | ICD-10-CM

## 2025-06-24 DIAGNOSIS — Z95.0 S/P PLACEMENT OF LEADLESS CARDIAC PACEMAKER: ICD-10-CM

## 2025-06-24 DIAGNOSIS — E78.2 MIXED HYPERLIPIDEMIA: ICD-10-CM

## 2025-06-24 PROCEDURE — 99214 OFFICE O/P EST MOD 30 MIN: CPT | Performed by: INTERNAL MEDICINE

## 2025-06-24 PROCEDURE — 3075F SYST BP GE 130 - 139MM HG: CPT | Performed by: INTERNAL MEDICINE

## 2025-06-24 PROCEDURE — 1159F MED LIST DOCD IN RCRD: CPT | Performed by: INTERNAL MEDICINE

## 2025-06-24 PROCEDURE — 3079F DIAST BP 80-89 MM HG: CPT | Performed by: INTERNAL MEDICINE

## 2025-06-24 PROCEDURE — 1160F RVW MEDS BY RX/DR IN RCRD: CPT | Performed by: INTERNAL MEDICINE

## 2025-06-24 NOTE — PROGRESS NOTES
"Chief Complaint  Post-op (Pt had pacemaker implant 6/16. Is having gallbladder surgery 7/2./Pt says she is having pain and sob, but it is in her gallbladder. /) and Surgical Clearance (Pt needs a cardio clearance for surgery scheduled 7/2/)    Subjective      Michelle Berg presents to [facility] CARDIOLOGY  History of Present Illness  Michelle Berg is a 70-year-old female already known to me by virtue of hospital admission of 6/13/2025 until 6/17/2025.  Prior to that admission a Holter monitor had been performed showing multiple episodes of complete heart block resulting with ventricular standstill of up to 5.2 seconds.  During the hospital stay, a Medtronic Micra pacemaker was placed by electrophysiologist, Dr.Vedran Brody.  A cardiac PET/CT scan was performed and was low risk for myocardial ischemia.    The patient has been found to have a gallbladder ejection fraction of 6% and evidence of biliary dyskinesia with cholecystectomy recommended and planned for 7/2/2025.  We are to provide cardiology clearance for this procedure.  The patient has not had any anginal type chest discomfort or unusual shortness of breath, palpitations, syncope or near syncope.  She continues to have abdominal symptoms however.    Objective   Vital Signs:  /83 (BP Location: Right arm, Patient Position: Sitting, Cuff Size: Adult)   Pulse 91   Resp 18   Ht 167.6 cm (65.98\")   Wt 77.8 kg (171 lb 9.6 oz)   BMI 27.71 kg/m²   Estimated body mass index is 27.71 kg/m² as calculated from the following:    Height as of this encounter: 167.6 cm (65.98\").    Weight as of this encounter: 77.8 kg (171 lb 9.6 oz).            Physical Exam    A 70-year-old female in no apparent distress.  She is awake, alert and oriented x 3.  HEENT: No scleral icterus.  Neck: No carotid bruits or jugular venous distention.  Lungs: Clear to auscultation.  Heart: Regular rhythm without audible murmur or gallop sound.  Abdomen: No " masses, tenderness or organomegaly.  Extremities: No pretibial edema or cyanosis.  Neurologic: No obvious focal abnormalities noted.  Result Review :                   Assessment and Plan   Diagnoses and all orders for this visit:    1. Preoperative cardiovascular examination (Primary)    2. S/P placement of leadless cardiac pacemaker    3. Primary hypertension    4. Mixed hyperlipidemia    1.  Preoperative cardiovascular examination.  She had a low risk cardiac PET/CT during her hospital stay recently.  This was performed because she had some exertional dyspnea and chest heaviness.  This was likely due to failure of her heart rate to respond appropriately to exertion before her pacemaker placement.  She is no longer having any discomfort or shortness of breath.  There is no objective evidence of heart failure on exam.    She is therefore felt to have a very acceptable cardiac risk for the proposed surgical procedure which is cholecystectomy.  From a cardiac standpoint, she is cleared for cholecystectomy.    2.  Status post placement of a leadless pacemaker.  Continue follow-up in the device clinic.  This device appears to be functioning appropriately.    3.  Hypertension.  Current blood pressure is 132/83.  Continue lisinopril-HCTZ 20-25 daily.    4.  Hyperlipidemia.  On 5/19/2025 her total cholesterol was 230 with HDL 49 and LDL of 146 and VLDL of 35.  Triglycerides were 194.  She was started on a atorvastatin 20 mg daily at that time and this has been continued.  She should have a repeat lipid panel with a complete metabolic panel drawn in mid to late July.  It appears that her lipids are managed and followed by primary care, Gail SANDOVAL.    All questions are answered to the best of my ability.  A return visit is scheduled in 6 months.  The patient is encouraged to contact us for any further questions or concerns.    As always, I appreciate the opportunity to be of service in the care of your  patients.    Again, the patient is cleared for the proposed surgical procedure from a cardiac standpoint.  Please let us know if there are any further questions or concerns.  The cardiology service is available during her hospitalization if necessary.      There are no Patient Instructions on file for this visit.       Follow Up   Return in about 6 months (around 12/24/2025) for Next scheduled follow up.  Patient was given instructions and counseling regarding her condition or for health maintenance advice. Please see specific information pulled into the AVS if appropriate.

## 2025-07-01 ENCOUNTER — TELEPHONE (OUTPATIENT)
Dept: SURGERY | Facility: CLINIC | Age: 70
End: 2025-07-01
Payer: MEDICARE

## 2025-07-01 ENCOUNTER — CLINICAL SUPPORT NO REQUIREMENTS (OUTPATIENT)
Dept: CARDIOLOGY | Facility: CLINIC | Age: 70
End: 2025-07-01
Payer: MEDICARE

## 2025-07-01 NOTE — TELEPHONE ENCOUNTER
Called Michelle to confirm her surgery date 07/02 with an arrival time of 11.   Reminded her not to eat or drink after midnight.   Let her know to come through the main entrance of the hospital and check in at main registration.     CBC  07/01    Patient confirmed

## 2025-07-02 ENCOUNTER — HOSPITAL ENCOUNTER (OUTPATIENT)
Facility: HOSPITAL | Age: 70
Setting detail: HOSPITAL OUTPATIENT SURGERY
Discharge: HOME | End: 2025-07-02
Attending: STUDENT IN AN ORGANIZED HEALTH CARE EDUCATION/TRAINING PROGRAM | Admitting: STUDENT IN AN ORGANIZED HEALTH CARE EDUCATION/TRAINING PROGRAM
Payer: MEDICARE

## 2025-07-02 ENCOUNTER — ANESTHESIA EVENT (OUTPATIENT)
Dept: PERIOP | Facility: HOSPITAL | Age: 70
End: 2025-07-02
Payer: MEDICARE

## 2025-07-02 ENCOUNTER — ANESTHESIA (OUTPATIENT)
Dept: PERIOP | Facility: HOSPITAL | Age: 70
End: 2025-07-02
Payer: MEDICARE

## 2025-07-02 VITALS
TEMPERATURE: 97.4 F | DIASTOLIC BLOOD PRESSURE: 79 MMHG | HEART RATE: 73 BPM | RESPIRATION RATE: 20 BRPM | SYSTOLIC BLOOD PRESSURE: 142 MMHG | OXYGEN SATURATION: 95 %

## 2025-07-02 DIAGNOSIS — K82.8 BILIARY DYSKINESIA: ICD-10-CM

## 2025-07-02 PROCEDURE — 25010000002 INDOCYANINE GREEN 25 MG RECONSTITUTED SOLUTION: Performed by: STUDENT IN AN ORGANIZED HEALTH CARE EDUCATION/TRAINING PROGRAM

## 2025-07-02 PROCEDURE — 25010000002 CEFAZOLIN PER 500 MG: Performed by: STUDENT IN AN ORGANIZED HEALTH CARE EDUCATION/TRAINING PROGRAM

## 2025-07-02 PROCEDURE — 25010000002 DEXAMETHASONE PER 1 MG: Performed by: ANESTHESIOLOGY

## 2025-07-02 PROCEDURE — 25010000002 PROPOFOL 10 MG/ML EMULSION: Performed by: NURSE ANESTHETIST, CERTIFIED REGISTERED

## 2025-07-02 PROCEDURE — 25010000002 FENTANYL CITRATE (PF) 50 MCG/ML SOLUTION: Performed by: ANESTHESIOLOGY

## 2025-07-02 PROCEDURE — 25010000002 MORPHINE SULFATE (PF) 2 MG/ML SOLUTION 1 ML CARTRIDGE: Performed by: STUDENT IN AN ORGANIZED HEALTH CARE EDUCATION/TRAINING PROGRAM

## 2025-07-02 PROCEDURE — 25010000002 LIDOCAINE PF 2% 2 % SOLUTION: Performed by: NURSE ANESTHETIST, CERTIFIED REGISTERED

## 2025-07-02 PROCEDURE — 25010000002 DEXAMETHASONE PER 1 MG: Performed by: STUDENT IN AN ORGANIZED HEALTH CARE EDUCATION/TRAINING PROGRAM

## 2025-07-02 PROCEDURE — 25010000002 MIDAZOLAM PER 1MG: Performed by: ANESTHESIOLOGY

## 2025-07-02 PROCEDURE — 25010000002 FENTANYL CITRATE (PF) 100 MCG/2ML SOLUTION: Performed by: NURSE ANESTHETIST, CERTIFIED REGISTERED

## 2025-07-02 PROCEDURE — 25010000002 DEXAMETHASONE PER 1 MG: Performed by: NURSE ANESTHETIST, CERTIFIED REGISTERED

## 2025-07-02 PROCEDURE — 25010000002 LIDOCAINE 1 % SOLUTION 20 ML VIAL: Performed by: STUDENT IN AN ORGANIZED HEALTH CARE EDUCATION/TRAINING PROGRAM

## 2025-07-02 PROCEDURE — 25010000002 BUPIVACAINE 0.5 % SOLUTION 50 ML VIAL: Performed by: STUDENT IN AN ORGANIZED HEALTH CARE EDUCATION/TRAINING PROGRAM

## 2025-07-02 PROCEDURE — 25010000002 BUPIVACAINE (PF) 0.25 % SOLUTION 30 ML VIAL: Performed by: STUDENT IN AN ORGANIZED HEALTH CARE EDUCATION/TRAINING PROGRAM

## 2025-07-02 PROCEDURE — 88304 TISSUE EXAM BY PATHOLOGIST: CPT | Performed by: STUDENT IN AN ORGANIZED HEALTH CARE EDUCATION/TRAINING PROGRAM

## 2025-07-02 PROCEDURE — 25810000003 LACTATED RINGERS PER 1000 ML: Performed by: STUDENT IN AN ORGANIZED HEALTH CARE EDUCATION/TRAINING PROGRAM

## 2025-07-02 PROCEDURE — 25010000002 HEPARIN (PORCINE) PER 1000 UNITS: Performed by: STUDENT IN AN ORGANIZED HEALTH CARE EDUCATION/TRAINING PROGRAM

## 2025-07-02 PROCEDURE — 25010000002 SUGAMMADEX 200 MG/2ML SOLUTION: Performed by: NURSE ANESTHETIST, CERTIFIED REGISTERED

## 2025-07-02 PROCEDURE — S2900 ROBOTIC SURGICAL SYSTEM: HCPCS | Performed by: STUDENT IN AN ORGANIZED HEALTH CARE EDUCATION/TRAINING PROGRAM

## 2025-07-02 PROCEDURE — 25010000002 LIDOCAINE 1% - EPINEPHRINE 1:100000 1 %-1:100000 SOLUTION 20 ML VIAL: Performed by: STUDENT IN AN ORGANIZED HEALTH CARE EDUCATION/TRAINING PROGRAM

## 2025-07-02 PROCEDURE — 47563 LAPARO CHOLECYSTECTOMY/GRAPH: CPT | Performed by: STUDENT IN AN ORGANIZED HEALTH CARE EDUCATION/TRAINING PROGRAM

## 2025-07-02 PROCEDURE — 25010000002 ONDANSETRON PER 1 MG: Performed by: NURSE ANESTHETIST, CERTIFIED REGISTERED

## 2025-07-02 DEVICE — LARGE LIGATION CLIPS 6 CLIPS/CART
Type: IMPLANTABLE DEVICE | Site: ABDOMEN | Status: FUNCTIONAL
Brand: VAS-Q-CLIP

## 2025-07-02 DEVICE — HEMOST ABS SURGICEL PWDR 3GM: Type: IMPLANTABLE DEVICE | Site: ABDOMEN | Status: FUNCTIONAL

## 2025-07-02 RX ORDER — HEPARIN SODIUM 5000 [USP'U]/ML
5000 INJECTION, SOLUTION INTRAVENOUS; SUBCUTANEOUS ONCE
Status: COMPLETED | OUTPATIENT
Start: 2025-07-02 | End: 2025-07-02

## 2025-07-02 RX ORDER — SODIUM CHLORIDE 0.9 % (FLUSH) 0.9 %
10 SYRINGE (ML) INJECTION EVERY 12 HOURS SCHEDULED
Status: DISCONTINUED | OUTPATIENT
Start: 2025-07-02 | End: 2025-07-02 | Stop reason: HOSPADM

## 2025-07-02 RX ORDER — FENTANYL CITRATE 50 UG/ML
INJECTION, SOLUTION INTRAMUSCULAR; INTRAVENOUS AS NEEDED
Status: DISCONTINUED | OUTPATIENT
Start: 2025-07-02 | End: 2025-07-02 | Stop reason: SURG

## 2025-07-02 RX ORDER — SODIUM CHLORIDE, SODIUM LACTATE, POTASSIUM CHLORIDE, CALCIUM CHLORIDE 600; 310; 30; 20 MG/100ML; MG/100ML; MG/100ML; MG/100ML
1000 INJECTION, SOLUTION INTRAVENOUS CONTINUOUS
Status: DISCONTINUED | OUTPATIENT
Start: 2025-07-02 | End: 2025-07-02 | Stop reason: HOSPADM

## 2025-07-02 RX ORDER — MAGNESIUM HYDROXIDE 1200 MG/15ML
LIQUID ORAL AS NEEDED
Status: DISCONTINUED | OUTPATIENT
Start: 2025-07-02 | End: 2025-07-02 | Stop reason: HOSPADM

## 2025-07-02 RX ORDER — SODIUM CHLORIDE, SODIUM LACTATE, POTASSIUM CHLORIDE, CALCIUM CHLORIDE 600; 310; 30; 20 MG/100ML; MG/100ML; MG/100ML; MG/100ML
75 INJECTION, SOLUTION INTRAVENOUS CONTINUOUS
Status: DISPENSED | OUTPATIENT
Start: 2025-07-02 | End: 2025-07-02

## 2025-07-02 RX ORDER — SODIUM CHLORIDE 0.9 % (FLUSH) 0.9 %
10 SYRINGE (ML) INJECTION AS NEEDED
Status: DISCONTINUED | OUTPATIENT
Start: 2025-07-02 | End: 2025-07-02 | Stop reason: HOSPADM

## 2025-07-02 RX ORDER — ONDANSETRON 2 MG/ML
4 INJECTION INTRAMUSCULAR; INTRAVENOUS ONCE AS NEEDED
Status: DISCONTINUED | OUTPATIENT
Start: 2025-07-02 | End: 2025-07-02 | Stop reason: HOSPADM

## 2025-07-02 RX ORDER — ACETAMINOPHEN 500 MG
1000 TABLET ORAL ONCE
Status: COMPLETED | OUTPATIENT
Start: 2025-07-02 | End: 2025-07-02

## 2025-07-02 RX ORDER — MIDAZOLAM HYDROCHLORIDE 2 MG/2ML
2 INJECTION, SOLUTION INTRAMUSCULAR; INTRAVENOUS
Status: DISCONTINUED | OUTPATIENT
Start: 2025-07-02 | End: 2025-07-02 | Stop reason: HOSPADM

## 2025-07-02 RX ORDER — HYDROCODONE BITARTRATE AND ACETAMINOPHEN 10; 325 MG/1; MG/1
1 TABLET ORAL EVERY 4 HOURS PRN
Status: DISCONTINUED | OUTPATIENT
Start: 2025-07-02 | End: 2025-07-02 | Stop reason: HOSPADM

## 2025-07-02 RX ORDER — FENTANYL CITRATE 50 UG/ML
50 INJECTION, SOLUTION INTRAMUSCULAR; INTRAVENOUS
Status: DISCONTINUED | OUTPATIENT
Start: 2025-07-02 | End: 2025-07-02 | Stop reason: HOSPADM

## 2025-07-02 RX ORDER — LIDOCAINE HYDROCHLORIDE 20 MG/ML
INJECTION, SOLUTION EPIDURAL; INFILTRATION; INTRACAUDAL; PERINEURAL AS NEEDED
Status: DISCONTINUED | OUTPATIENT
Start: 2025-07-02 | End: 2025-07-02 | Stop reason: SURG

## 2025-07-02 RX ORDER — ROCURONIUM BROMIDE 10 MG/ML
INJECTION, SOLUTION INTRAVENOUS AS NEEDED
Status: DISCONTINUED | OUTPATIENT
Start: 2025-07-02 | End: 2025-07-02 | Stop reason: SURG

## 2025-07-02 RX ORDER — ONDANSETRON 2 MG/ML
4 INJECTION INTRAMUSCULAR; INTRAVENOUS EVERY 6 HOURS PRN
Status: DISCONTINUED | OUTPATIENT
Start: 2025-07-02 | End: 2025-07-02 | Stop reason: HOSPADM

## 2025-07-02 RX ORDER — SODIUM CHLORIDE 9 MG/ML
40 INJECTION, SOLUTION INTRAVENOUS AS NEEDED
Status: DISCONTINUED | OUTPATIENT
Start: 2025-07-02 | End: 2025-07-02 | Stop reason: HOSPADM

## 2025-07-02 RX ORDER — NALOXONE HCL 0.4 MG/ML
0.4 VIAL (ML) INJECTION AS NEEDED
Status: DISCONTINUED | OUTPATIENT
Start: 2025-07-02 | End: 2025-07-02 | Stop reason: HOSPADM

## 2025-07-02 RX ORDER — PROPOFOL 10 MG/ML
VIAL (ML) INTRAVENOUS AS NEEDED
Status: DISCONTINUED | OUTPATIENT
Start: 2025-07-02 | End: 2025-07-02 | Stop reason: SURG

## 2025-07-02 RX ORDER — IBUPROFEN 600 MG/1
600 TABLET, FILM COATED ORAL EVERY 6 HOURS PRN
Status: DISCONTINUED | OUTPATIENT
Start: 2025-07-02 | End: 2025-07-02 | Stop reason: HOSPADM

## 2025-07-02 RX ORDER — FLUMAZENIL 0.1 MG/ML
0.2 INJECTION INTRAVENOUS AS NEEDED
Status: DISCONTINUED | OUTPATIENT
Start: 2025-07-02 | End: 2025-07-02 | Stop reason: HOSPADM

## 2025-07-02 RX ORDER — ONDANSETRON 4 MG/1
4 TABLET, ORALLY DISINTEGRATING ORAL EVERY 8 HOURS PRN
Qty: 4 TABLET | Refills: 0 | Status: SHIPPED | OUTPATIENT
Start: 2025-07-02

## 2025-07-02 RX ORDER — SODIUM CHLORIDE, SODIUM LACTATE, POTASSIUM CHLORIDE, CALCIUM CHLORIDE 600; 310; 30; 20 MG/100ML; MG/100ML; MG/100ML; MG/100ML
100 INJECTION, SOLUTION INTRAVENOUS CONTINUOUS
Status: DISCONTINUED | OUTPATIENT
Start: 2025-07-02 | End: 2025-07-02 | Stop reason: HOSPADM

## 2025-07-02 RX ORDER — HYDROCODONE BITARTRATE AND ACETAMINOPHEN 5; 325 MG/1; MG/1
1 TABLET ORAL EVERY 4 HOURS PRN
Status: DISCONTINUED | OUTPATIENT
Start: 2025-07-02 | End: 2025-07-02 | Stop reason: HOSPADM

## 2025-07-02 RX ORDER — INDOCYANINE GREEN AND WATER 25 MG
2.5 KIT INJECTION ONCE
Status: COMPLETED | OUTPATIENT
Start: 2025-07-02 | End: 2025-07-02

## 2025-07-02 RX ORDER — ONDANSETRON 2 MG/ML
INJECTION INTRAMUSCULAR; INTRAVENOUS AS NEEDED
Status: DISCONTINUED | OUTPATIENT
Start: 2025-07-02 | End: 2025-07-02 | Stop reason: SURG

## 2025-07-02 RX ORDER — DOCUSATE SODIUM 100 MG/1
100 CAPSULE, LIQUID FILLED ORAL DAILY PRN
Qty: 30 CAPSULE | Refills: 1 | Status: SHIPPED | OUTPATIENT
Start: 2025-07-02 | End: 2026-07-02

## 2025-07-02 RX ORDER — DEXAMETHASONE SODIUM PHOSPHATE 4 MG/ML
INJECTION, SOLUTION INTRA-ARTICULAR; INTRALESIONAL; INTRAMUSCULAR; INTRAVENOUS; SOFT TISSUE AS NEEDED
Status: DISCONTINUED | OUTPATIENT
Start: 2025-07-02 | End: 2025-07-02 | Stop reason: SURG

## 2025-07-02 RX ORDER — LABETALOL HYDROCHLORIDE 5 MG/ML
5 INJECTION, SOLUTION INTRAVENOUS
Status: DISCONTINUED | OUTPATIENT
Start: 2025-07-02 | End: 2025-07-02 | Stop reason: HOSPADM

## 2025-07-02 RX ORDER — DEXAMETHASONE SODIUM PHOSPHATE 4 MG/ML
4 INJECTION, SOLUTION INTRA-ARTICULAR; INTRALESIONAL; INTRAMUSCULAR; INTRAVENOUS; SOFT TISSUE ONCE AS NEEDED
Status: COMPLETED | OUTPATIENT
Start: 2025-07-02 | End: 2025-07-02

## 2025-07-02 RX ORDER — SODIUM CHLORIDE 0.9 % (FLUSH) 0.9 %
3 SYRINGE (ML) INJECTION EVERY 12 HOURS SCHEDULED
Status: DISCONTINUED | OUTPATIENT
Start: 2025-07-02 | End: 2025-07-02 | Stop reason: HOSPADM

## 2025-07-02 RX ORDER — HYDROCODONE BITARTRATE AND ACETAMINOPHEN 7.5; 325 MG/1; MG/1
1 TABLET ORAL ONCE AS NEEDED
Refills: 0 | Status: DISCONTINUED | OUTPATIENT
Start: 2025-07-02 | End: 2025-07-02 | Stop reason: HOSPADM

## 2025-07-02 RX ORDER — SODIUM CHLORIDE 0.9 % (FLUSH) 0.9 %
3-10 SYRINGE (ML) INJECTION AS NEEDED
Status: DISCONTINUED | OUTPATIENT
Start: 2025-07-02 | End: 2025-07-02 | Stop reason: HOSPADM

## 2025-07-02 RX ORDER — OXYCODONE HYDROCHLORIDE 5 MG/1
5 TABLET ORAL EVERY 6 HOURS PRN
Qty: 12 TABLET | Refills: 0 | Status: SHIPPED | OUTPATIENT
Start: 2025-07-02

## 2025-07-02 RX ADMIN — HYDROCODONE BITARTRATE AND ACETAMINOPHEN 1 TABLET: 10; 325 TABLET ORAL at 14:16

## 2025-07-02 RX ADMIN — DEXAMETHASONE SODIUM PHOSPHATE 4 MG: 4 INJECTION, SOLUTION INTRA-ARTICULAR; INTRALESIONAL; INTRAMUSCULAR; INTRAVENOUS; SOFT TISSUE at 12:55

## 2025-07-02 RX ADMIN — ROCURONIUM 50 MG: 50 INJECTION, SOLUTION INTRAVENOUS at 13:06

## 2025-07-02 RX ADMIN — ACETAMINOPHEN 1000 MG: 500 TABLET, FILM COATED ORAL at 12:55

## 2025-07-02 RX ADMIN — FENTANYL CITRATE 50 MCG: 50 INJECTION, SOLUTION INTRAMUSCULAR; INTRAVENOUS at 14:17

## 2025-07-02 RX ADMIN — SODIUM CHLORIDE, POTASSIUM CHLORIDE, SODIUM LACTATE AND CALCIUM CHLORIDE 1000 ML: 600; 310; 30; 20 INJECTION, SOLUTION INTRAVENOUS at 11:29

## 2025-07-02 RX ADMIN — CEFAZOLIN 2 G: 2 INJECTION, POWDER, FOR SOLUTION INTRAMUSCULAR; INTRAVENOUS at 13:11

## 2025-07-02 RX ADMIN — DEXAMETHASONE SODIUM PHOSPHATE 4 MG: 4 INJECTION, SOLUTION INTRA-ARTICULAR; INTRALESIONAL; INTRAMUSCULAR; INTRAVENOUS; SOFT TISSUE at 13:13

## 2025-07-02 RX ADMIN — MIDAZOLAM HYDROCHLORIDE 2 MG: 1 INJECTION, SOLUTION INTRAMUSCULAR; INTRAVENOUS at 12:55

## 2025-07-02 RX ADMIN — LIDOCAINE HYDROCHLORIDE 100 MG: 20 INJECTION, SOLUTION EPIDURAL; INFILTRATION; INTRACAUDAL; PERINEURAL at 13:04

## 2025-07-02 RX ADMIN — INDOCYANINE GREEN AND WATER 2.5 MG: KIT at 12:31

## 2025-07-02 RX ADMIN — SUGAMMADEX 200 MG: 100 INJECTION, SOLUTION INTRAVENOUS at 13:55

## 2025-07-02 RX ADMIN — FENTANYL CITRATE 100 MCG: 50 INJECTION, SOLUTION INTRAMUSCULAR; INTRAVENOUS at 13:05

## 2025-07-02 RX ADMIN — HEPARIN SODIUM 5000 UNITS: 5000 INJECTION, SOLUTION INTRAVENOUS; SUBCUTANEOUS at 12:31

## 2025-07-02 RX ADMIN — FENTANYL CITRATE 100 MCG: 50 INJECTION, SOLUTION INTRAMUSCULAR; INTRAVENOUS at 13:22

## 2025-07-02 RX ADMIN — PROPOFOL 150 MG: 10 INJECTION, EMULSION INTRAVENOUS at 13:06

## 2025-07-02 RX ADMIN — ONDANSETRON 4 MG: 2 INJECTION INTRAMUSCULAR; INTRAVENOUS at 13:37

## 2025-07-02 NOTE — ANESTHESIA PREPROCEDURE EVALUATION
Anesthesia Evaluation     Patient summary reviewed   no history of anesthetic complications:   NPO Solid Status: > 8 hours  NPO Liquid Status: > 8 hours           Airway   Mallampati: II  TM distance: >3 FB  Dental          Pulmonary - negative pulmonary ROS   (-) asthma, sleep apnea  Cardiovascular   Exercise tolerance: good (4-7 METS)    (+) pacemaker (medtronic micra) pacemaker, hypertension, dysrhythmias (AV block)  (-) past MI, cardiac stents, hyperlipidemia      Neuro/Psych  (-) seizures, TIA, CVA  GI/Hepatic/Renal/Endo    (+) GERD  (-) liver disease, no renal disease, diabetes    Musculoskeletal     Abdominal    Substance History      OB/GYN          Other                    Anesthesia Plan    ASA 3     general     intravenous induction     Anesthetic plan, risks, benefits, and alternatives have been provided, discussed and informed consent has been obtained with: patient.    CODE STATUS:

## 2025-07-02 NOTE — DISCHARGE INSTRUCTIONS
[facility] General Surgery Discharge Instructions  Patient Name: Michelle Berg  MRN: [id number]  YOB: 1955  Admit Date: 07/02/25      Admitting Diagnosis   Biliary dyskinesia      Procedures/Surgery:   07/02/25: Robotic cholecystectomy      Home Medications:    Pain medication:   - Take 1000mg of tylenol every 8 hours for 3 days. After three days, take it only as needed.  - Take 200mg of ibuprofen (motrin) every 8 hours for 3 days. After three days, take it only as needed.   - You have a prescription for a narcotic. Take these only as needed after you have taken the tylenol and ibuprofen. If you are taking the roxicodone, make sure to take a stool softener (colace) with it as it can cause constipation.   - The narcotic may make you nauseated, you will have a prescription for zofran in case of nausea.       Wound Care/Drains   - you have steri-strips and surgical adhesive covering your incisions; leave intact and do not peel it off. This will fall off on its own anywhere from 10days to 4 weeks.    - No swimming/soaking/bathing/wading or wearing waders for 2 weeks to allow incisions to heal.   - You may shower with steri-strips and surgical adhesive in place 48 hours after surgery.  Allow warm soapy water to run over the incisions, rinse thoroughly, do not scrub, and pat dry gently.    Activity     :   - You may slowly increase your activity after surgery beginning with walking the day of surgery, and increasing the distance slowly.  - No heavy lifting-this means no more than 15 pounds for at least 6 weeks following your operation.  Think about the pressure your abdomen feels if you hold a gallon of milk at arms length-you should feel no more pressure than this from anything you lift - if you do it is too heavy  - Do not drive, use power tools, or operate machinery (including lawn equipment) while taking pain medication    Follow-up Visits     :   - Schedule an appointment to see   Laquita in his clinic in 2 weeks.  - If you have any concerns before then, call my office at [phone] -[facility] you have concerns about bleeding, a fever greater than 102F, lightheadedness, dizziness, chest pain, shortness of breath, you should call my office as above or proceed to an emergency department closest to you for evaluation.      Collin Coelho MD  7/2/2025   14:15 CDT

## 2025-07-02 NOTE — ANESTHESIA POSTPROCEDURE EVALUATION
Patient: Michelle Berg    Procedure Summary       Date: 07/02/25 Room / Location:  [facility] OR 06 /  [facility] OR    Anesthesia Start: 1304 Anesthesia Stop: 1407    Procedure: LAPAROSCOPIC ROBOTIC ASSISTED CHOLECYSTECTOMY WTIH PRE-OPERATIVE INDOCYANINE GREEN INJECTION (Abdomen) Diagnosis:       Biliary dyskinesia      (Biliary dyskinesia [K82.8])    Surgeons: Collin Coelho MD Provider: Omero Sauer CRNA    Anesthesia Type: general ASA Status: 3            Anesthesia Type: general    Vitals  Vitals Value Taken Time   /71 07/02/25 15:20   Temp 97.4 °F (36.3 °C) 07/02/25 14:05   Pulse 70 07/02/25 15:21   Resp 14 07/02/25 15:21   SpO2 92 % 07/02/25 15:21           Post Anesthesia Care and Evaluation    Patient location during evaluation: PACU  Patient participation: complete - patient participated  Level of consciousness: awake and awake and alert  Pain score: 0  Pain management: adequate    Airway patency: patent  Anesthetic complications: No anesthetic complications  PONV Status: none  Cardiovascular status: acceptable  Respiratory status: acceptable  Hydration status: acceptable    Comments: Patient discharged according to acceptable Ronaldo score per RN assessment. See nursing records for further information.     Blood pressure 135/72, pulse 69, temperature 97.4 °F (36.3 °C), temperature source Temporal, resp. rate 20, SpO2 95%, not currently breastfeeding.

## 2025-07-02 NOTE — ANESTHESIA PROCEDURE NOTES
Airway  Reason: elective    Date/Time: 7/2/2025 1:08 PM  Airway not difficult    General Information and Staff    Patient location during procedure: OR  CRNA/CAA: Omero Sauer CRNA    Indications and Patient Condition  Indications for airway management: airway protection    Preoxygenated: yes    Mask difficulty assessment: 0 - not attempted    Final Airway Details    Final airway type: endotracheal airway      Successful airway: ETT  Cuffed: yes   Successful intubation technique: direct laryngoscopy  Blade: Nobles  Blade size: 2  ETT size (mm): 7.0  Cormack-Lehane Classification: grade I - full view of glottis  Placement verified by: chest auscultation and capnometry   Cuff volume (mL): 6  Measured from: gums  ETT/EBT to gums (cm): 22  Number of attempts at approach: 1  Assessment: lips, teeth, and gum same as pre-op and atraumatic intubation

## 2025-07-02 NOTE — OP NOTE
GENERAL SURGERY OPERATIVE NOTE    Operative Date: 07/02/25    Operating Room Number: 6    Patient name: Michelle Berg    Preoperative diagnosis: biliary dyskinesia    Postoperative diagnosis: same    Procedure performed: robotic cholecystectomy with preop ICG    Surgeon: Collin Coelho MD    Anesthesia: General endotracheal anesthesia    Indications: This is a 70 y.o. female presenting with biliary dyskinesia manifested by severe abdominal pain, food intolerance.  We have recommended cholecystectomy in a minimally invasive fashion and the patient is agreeable and elects to proceed.       Findings:   - Thick adhesions around the cystic triangle, with fatty infiltration obscuring the initial view of the cystic duct.  Clear view of ICG through the common duct and the cystic duct after dissection of the cystic triangle.  -Obtained critical view of safety, with 2 and only 2 structures during the gallbladder with the base of the liver visible in between.      Procedure: The patient was seen and assessed in the preoperative holding area. Signed consents were obtained after a full discussion of the risks, benefits, and alternative therapies. The patient was then transported to the operating theatre, transferred to the operating table under her own power, general anesthesia was induced, and the patient was intubated without difficulty. A timeout was performed, the patient's identity and the proposed procedure were confirmed; all present agreed and elected to proceed.    The abdomen was accessed with Veress needle at Wray's point. Initial insufflation pressures were appropriately low.  The abdomen was insufflated to a pressure of 15 mmHg.  The abdomen was entered with an 8 mm robotic trocar in an Optiview fashion using a 5 mm 30 degree scope.  No injury to the abdomen was identified upon initial survey of the abdomen.  Additional trocars were then placed in the typical fashion from the patient's left upper  quadrant to the right lower quadrant, approximately 1 handsbreadth apart, 4 in total.  The robot was docked.  The gallbladder was grasped by the fundus and retracted cephalad.  The infundibulum was retracted towards the right lower quadrant.  Using a combination of blunt dissection and electrocautery the cystic triangle was dissected free of fatty attachments, of which there were many.  The cystic artery and the cystic duct were isolated, and the bottom third of the cystic plate was seen and cleared of adhesions, providing the critical view of safety.  The artery was clipped and divided sharply.  The  cystic duct was thricely clipped and divided.     The gallbladder was then taken off the liver bed with electrocautery.  The gallbladder was then placed in an Endo Catch bag, and was removed through the left-sided periumbilical port site.  The site was then closed at the fascial layer using a 0 Vicryl suture in an interrupted fashion, using a transfascial suture passer under direct visualization.    The liver bed was then reinspected, and no flow of bile was seen coming from the liver bed via ICG, and no bleeding was apparent.  The abdomen was allowed to desufflate and the trocars removed under direct visualization.  The skin at all sites was closed with 4-0 Monocryl in a running subcuticular fashion.  The wounds were dressed with Steri-Strips and surgical adhesive.    At this point, the procedure was concluded, the patient was allowed to emerge from anesthesia, was extubated without difficulty, and was transported to PACU in stable condition.    I was present for the entirety of the procedure.    Complications: None    EBL: 5 mL    Specimens: Gallbladder    Drains: None    Implants: None    Disposition: PACU-anticipate discharge home following recovery from anesthesia      Collin Coelho MD  7/2/2025   14:17 CDT

## 2025-07-03 ENCOUNTER — TELEPHONE (OUTPATIENT)
Dept: SURGERY | Facility: CLINIC | Age: 70
End: 2025-07-03
Payer: MEDICARE

## 2025-07-03 NOTE — TELEPHONE ENCOUNTER
Post OP phone call visit:    Type of surgery: robotic cholecystectomy with preop ICG.      Unable to reach patient. Left a detailed message with call back phone number for any questions or concerns.

## 2025-07-06 LAB
MDC_IDC_MSMT_BATTERY_RRT_TRIGGER: 2.58
MDC_IDC_MSMT_BATTERY_STATUS: NORMAL
MDC_IDC_MSMT_BATTERY_VOLTAGE: 3.17
MDC_IDC_MSMT_LEADCHNL_RV_IMPEDANCE_VALUE: 670
MDC_IDC_MSMT_LEADCHNL_RV_PACING_THRESHOLD_AMPLITUDE: 1.25
MDC_IDC_MSMT_LEADCHNL_RV_PACING_THRESHOLD_PULSEWIDTH: 0.2
MDC_IDC_MSMT_LEADCHNL_RV_SENSING_INTR_AMPL: 4.8
MDC_IDC_PG_IMPLANT_DTM: NORMAL
MDC_IDC_PG_MFG: NORMAL
MDC_IDC_PG_MODEL: NORMAL
MDC_IDC_PG_SERIAL: NORMAL
MDC_IDC_PG_TYPE: NORMAL
MDC_IDC_SESS_DTM: NORMAL
MDC_IDC_SET_BRADY_LOWRATE: 50
MDC_IDC_SET_BRADY_MAX_SENSOR_RATE: 120
MDC_IDC_SET_BRADY_MAX_TRACKING_RATE: 105
MDC_IDC_SET_BRADY_MODE: NORMAL
MDC_IDC_SET_BRADY_SAV_DELAY: 20
MDC_IDC_STAT_BRADY_RV_PERCENT_PACED: 7.8

## 2025-07-07 LAB
CYTO UR: NORMAL
LAB AP CASE REPORT: NORMAL
Lab: NORMAL
PATH REPORT.FINAL DX SPEC: NORMAL
PATH REPORT.GROSS SPEC: NORMAL

## 2025-07-09 ENCOUNTER — READMISSION MANAGEMENT (OUTPATIENT)
Dept: CALL CENTER | Facility: HOSPITAL | Age: 70
End: 2025-07-09
Payer: MEDICARE

## 2025-07-09 NOTE — OUTREACH NOTE
General Surgery Week 3 Survey      Flowsheet Row Responses   [facility], operated by [facility] patient discharged from? [place]   Does the patient have one of the following disease processes/diagnoses(primary or secondary)? General Surgery   Week 3 attempt successful? Yes   Call start time 1203   Call end time 1209   Discharge diagnosis Complete heart block, s/p Leadless pacemaker implant   Meds reviewed with patient/caregiver? Yes   Is the patient having any side effects they believe may be caused by any medication additions or changes? No   Does the patient have all medications related to this admission filled (includes all antibiotics, pain medications, etc.) Yes   Is the patient taking all medications as directed (includes completed medication regime)? Yes   Does the patient have a follow up appointment scheduled with their surgeon? Yes   Psychosocial issues? No   Did the patient receive a copy of their discharge instructions? Yes   Nursing interventions Reviewed instructions with patient   What is the patient's perception of their health status since discharge? Improving   Nursing interventions Nurse provided patient education   Is the patient /caregiver able to teach back basic post-op care? Keep incision areas clean,dry and protected   Is the patient/caregiver able to teach back signs and symptoms of incisional infection? Increased redness, swelling or pain at the incisonal site, Increased drainage or bleeding, Incisional warmth, Pus or odor from incision, Fever   Is the patient/caregiver able to teach back steps to recovery at home? Eat a well-balance diet, Set small, achievable goals for return to baseline health, Rest and rebuild strength, gradually increase activity   Is the patient/caregiver able to teach back the hierarchy of who to call/visit for symptoms/problems? PCP, Specialist, Home health nurse, Urgent Care, ED, 911 Yes   Week 3 call completed? Yes   Graduated Yes   Is the patient interested in additional calls  from an ambulatory ? No   Would this patient benefit from a Referral to [facility] Social Work? No   Wrap up additional comments Patient reports doing okay. Patient states back at work. Patient reports feeling fatigued. Encouraged well balanced diet and rest.   Call end time 1209            [employer] - Registered Nurse

## 2025-07-13 NOTE — PROGRESS NOTES
"  [facility] General Surgery Clinic Progress Note  Michelle Berg  MRN: [id number]  Age: 70 y.o. female   YOB: 1955      SUBJECTIVE  History of Presenting Illness   Patient is a 70 y.o. female biliary dyskinesia who presents for scheduled follow-up after recent robotic cholecystectomy on 7/2/2025.  Since surgery, she reports that she she is overall doing fairly well.  She does report some ongoing symptoms of reflux, burning, belching, which she notices are worse after some foods like bread.  Surgical standpoint, she feels somewhat better after her surgery, and she is healed well with no issues with her surgical sites, and pain has been minimal as she is only use Tylenol and ibuprofen for analgesia.      Physical Examination     Vitals:    07/15/25 0829 07/15/25 0852   BP: 176/88 150/88   BP Location:  Left arm   Patient Position:  Sitting   Cuff Size:  Adult   Weight: 78.9 kg (174 lb)    Height: 165.4 cm (65.12\")        Estimated body mass index is 28.85 kg/m² as calculated from the following:    Height as of this encounter: 165.4 cm (65.12\").    Weight as of this encounter: 78.9 kg (174 lb).  PREVIOUS WEIGHTS:   Wt Readings from Last 5 Encounters:   07/15/25 78.9 kg (174 lb)   06/25/25 79.1 kg (174 lb 6.1 oz)   06/24/25 77.8 kg (171 lb 9.6 oz)   06/23/25 77.8 kg (171 lb 9.6 oz)   06/18/25 78.2 kg (172 lb 8 oz)       Physical Examination:  Gen: awake, alert, sitting up in chair in no acute distress  HEENT: NCAT, EOMI, anicteric sclerae  CV: RRR, normotensive  Resp: nonlabored on room air, sats appropriate  Abd: soft, nontender, nondistended; surgical sites are clean dry and intact without evidence of hernia, infection, wound dehiscence  MSK: moves all four, no c/c/e  Neuro: no focal deficits, cranial nerves grossly intact  Psy:  appropriate, cooperative        Assessment/Plan   Michelle Berg is a 70 y.o. female with recent cholecystectomy returns to clinic for scheduled follow-up.  Doing " well, recovering appropriately surgery, however she has some ongoing issues with what sounds like potential gastroesophageal reflux disease.  She has never trialed PPI or other medications, and I have asked her to trial Prilosec after this visit, though I did discuss with her that if symptoms persist she may require upper endoscopy with GI to evaluate for other causes.   She has no need for further scheduled follow-up in surgical clinic, but knows to call or return to clinic if she has questions, concerns, or new symptoms.  Her lifting restrictions are removed, and she is free to return to her normal activities as tolerated.  Recommend continuing a low-fat diet for a total of 6 weeks postoperatively.      Smoking cessation: Never smoker  BMI: 28.9-previously addressed  Med rec complete: yes  VTE: VTE PPX is not indicated.        Collin Coelho MD  7/15/2025   14:13 CDT

## 2025-07-15 ENCOUNTER — OFFICE VISIT (OUTPATIENT)
Dept: SURGERY | Facility: CLINIC | Age: 70
End: 2025-07-15
Payer: MEDICARE

## 2025-07-15 VITALS
BODY MASS INDEX: 28.99 KG/M2 | WEIGHT: 174 LBS | SYSTOLIC BLOOD PRESSURE: 150 MMHG | DIASTOLIC BLOOD PRESSURE: 88 MMHG | HEIGHT: 65 IN

## 2025-07-15 DIAGNOSIS — K82.8 BILIARY DYSKINESIA: Primary | ICD-10-CM

## 2025-07-15 PROCEDURE — 99024 POSTOP FOLLOW-UP VISIT: CPT | Performed by: STUDENT IN AN ORGANIZED HEALTH CARE EDUCATION/TRAINING PROGRAM

## 2025-07-15 PROCEDURE — 3079F DIAST BP 80-89 MM HG: CPT | Performed by: STUDENT IN AN ORGANIZED HEALTH CARE EDUCATION/TRAINING PROGRAM

## 2025-07-15 PROCEDURE — 3077F SYST BP >= 140 MM HG: CPT | Performed by: STUDENT IN AN ORGANIZED HEALTH CARE EDUCATION/TRAINING PROGRAM

## 2025-07-15 PROCEDURE — 1159F MED LIST DOCD IN RCRD: CPT | Performed by: STUDENT IN AN ORGANIZED HEALTH CARE EDUCATION/TRAINING PROGRAM

## 2025-07-15 PROCEDURE — 1160F RVW MEDS BY RX/DR IN RCRD: CPT | Performed by: STUDENT IN AN ORGANIZED HEALTH CARE EDUCATION/TRAINING PROGRAM

## 2025-07-15 NOTE — LETTER
July 15, 2025     JAIME George  [address]  [place] [address]    Patient: Michelle Berg   YOB: 1955   Date of Visit: 7/15/2025     Dear JAIME George:    I have seen Michelle Berg in my office today for scheduled follow-up after recent robotic cholecystectomy.  Overall, she is doing very well postsurgically, and has had some symptom relief, with minimal postoperative pain.  However, she is still suffering from some likely reflux symptoms, and as she has never trialed a proton pump inhibitor, I have recommended she trial Prilosec over-the-counter to see if this gives her any relief.  In the future, if this persist, she may require referral to gastroenterology for potential upper endoscopy.    Should you have any questions or concerns about her care, please feel free to reach out.  Is been a privilege to participate in Ms. Berg's care, and follow her along with you.    Sincerely,        Collin Coelho MD        CC: No Recipients    Collin Coelho MD  07/15/25 1415  Sign when Signing Visit    [facility] Surgery Clinic Progress Note  Michelle Berg  MRN: [id number]  Age: 70 y.o. female   YOB: 1955      SUBJECTIVE  History of Presenting Illness   Patient is a 70 y.o. female biliary dyskinesia who presents for scheduled follow-up after recent robotic cholecystectomy on 7/2/2025.  Since surgery, she reports that she she is overall doing fairly well.  She does report some ongoing symptoms of reflux, burning, belching, which she notices are worse after some foods like bread.  Surgical standpoint, she feels somewhat better after her surgery, and she is healed well with no issues with her surgical sites, and pain has been minimal as she is only use Tylenol and ibuprofen for analgesia.      Physical Examination     Vitals:    07/15/25 0829 07/15/25 0852   BP: 176/88 150/88   BP Location:  Left arm   Patient Position:  Sitting  "  Cuff Size:  Adult   Weight: 78.9 kg (174 lb)    Height: 165.4 cm (65.12\")        Estimated body mass index is 28.85 kg/m² as calculated from the following:    Height as of this encounter: 165.4 cm (65.12\").    Weight as of this encounter: 78.9 kg (174 lb).  PREVIOUS WEIGHTS:   Wt Readings from Last 5 Encounters:   07/15/25 78.9 kg (174 lb)   06/25/25 79.1 kg (174 lb 6.1 oz)   06/24/25 77.8 kg (171 lb 9.6 oz)   06/23/25 77.8 kg (171 lb 9.6 oz)   06/18/25 78.2 kg (172 lb 8 oz)       Physical Examination:  Gen: awake, alert, sitting up in chair in no acute distress  HEENT: NCAT, EOMI, anicteric sclerae  CV: RRR, normotensive  Resp: nonlabored on room air, sats appropriate  Abd: soft, nontender, nondistended; surgical sites are clean dry and intact without evidence of hernia, infection, wound dehiscence  MSK: moves all four, no c/c/e  Neuro: no focal deficits, cranial nerves grossly intact  Psy:  appropriate, cooperative        Assessment/Plan   Michelle Berg is a 70 y.o. female with recent cholecystectomy returns to clinic for scheduled follow-up.  Doing well, recovering appropriately surgery, however she has some ongoing issues with what sounds like potential gastroesophageal reflux disease.  She has never trialed PPI or other medications, and I have asked her to trial Prilosec after this visit, though I did discuss with her that if symptoms persist she may require upper endoscopy with GI to evaluate for other causes.   She has no need for further scheduled follow-up in surgical clinic, but knows to call or return to clinic if she has questions, concerns, or new symptoms.  Her lifting restrictions are removed, and she is free to return to her normal activities as tolerated.  Recommend continuing a low-fat diet for a total of 6 weeks postoperatively.      Smoking cessation: Never smoker  BMI: 28.9-previously addressed  Med rec complete: yes  VTE: VTE PPX is not indicated.        Collin Coelho, " MD  7/15/2025   14:13 CDT

## 2025-07-31 ENCOUNTER — OFFICE VISIT (OUTPATIENT)
Dept: CARDIOLOGY | Facility: CLINIC | Age: 70
End: 2025-07-31
Payer: MEDICARE

## 2025-07-31 VITALS
SYSTOLIC BLOOD PRESSURE: 128 MMHG | DIASTOLIC BLOOD PRESSURE: 72 MMHG | HEIGHT: 65 IN | OXYGEN SATURATION: 98 % | BODY MASS INDEX: 29.22 KG/M2 | HEART RATE: 84 BPM | WEIGHT: 175.4 LBS

## 2025-07-31 DIAGNOSIS — Z95.0 PRESENCE OF LEADLESS CARDIAC PACEMAKER: ICD-10-CM

## 2025-07-31 DIAGNOSIS — I44.2 COMPLETE HEART BLOCK: Primary | ICD-10-CM

## 2025-07-31 NOTE — PROGRESS NOTES
"EP FOLLOW UP PATIENT VISIT    Chief Complaint  Complete Heart Block  (6 Week Follow Up/Leadless Cardiac Pacemaker )    Subjective        History of Present Illness    EP Problems:  1.  Right bundle branch block  2.  Mobitz 2 AV block  3.  Intermittent complete heart block  4.  Presence of a Micra AV leadless pacemaker  - 6/16/2025: DOI     Cardiology Problems:  1.  Hypertension  2.  Hyperlipidemia  3.  Atypical chest pain     Medical Problems:  1.  Anxiety    Patient is 70-year-old female who presents to the clinic today following up for Mobitz 2 AV block, intermittent complete heart block, and presence of a leadless pacemaker. She wore a Holter monitor in May that revealed several episodes of intermittent complete heart block with evidence of infranodal AV block. On 6/16/2025 she had a Micra AV leadless pacemaker implanted.    She says she has been doing okay since her pacemaker was implanted. She said she does not really feel any better but also does not feel any worse. She denies any fast or slow heart rates that she is aware of. She denies any lightheadedness or dizziness. She is planning to have her device checked in the device clinic today after her visit. She says that her insertion site has healed up well. She says she still getting used to the fact that she has a pacemaker.    Objective   Vital Signs:  /72 (BP Location: Right arm, Patient Position: Sitting, Cuff Size: Adult)   Pulse 84   Ht 165.4 cm (65.12\")   Wt 79.6 kg (175 lb 6.4 oz)   SpO2 98%   BMI 29.08 kg/m²   Estimated body mass index is 29.08 kg/m² as calculated from the following:    Height as of this encounter: 165.4 cm (65.12\").    Weight as of this encounter: 79.6 kg (175 lb 6.4 oz).      Physical Exam  Vitals reviewed.   Constitutional:       Appearance: Normal appearance. She is normal weight.   Cardiovascular:      Rate and Rhythm: Normal rate and regular rhythm.      Pulses: Normal pulses.           Radial pulses are 2+ on the " right side and 2+ on the left side.        Posterior tibial pulses are 2+ on the right side and 2+ on the left side.      Heart sounds: Normal heart sounds.   Pulmonary:      Effort: Pulmonary effort is normal.      Breath sounds: Normal breath sounds.   Musculoskeletal:      Right lower leg: No edema.      Left lower leg: No edema.   Skin:     General: Skin is warm and dry.   Neurological:      Mental Status: She is alert.            Result Review :  The following data was reviewed by: JAIME Proctor on 07/31/2025:  CMP          6/14/2025    03:03 6/15/2025    17:41 6/18/2025    13:25   CMP   Glucose 99  110  93    BUN 16.7  16.0  19.0    Creatinine 0.78  0.91  0.93    EGFR 82.9  68.9  67.1    Sodium 137  139  135    Potassium 3.8  3.4  3.9    Chloride 103  101  101    Calcium 9.1  9.7  9.3    Total Protein 6.4   7.1    Albumin 3.7   4.1    Globulin 2.7   3.0    Total Bilirubin 0.2   0.4    Alkaline Phosphatase 98   106    AST (SGOT) 16   18    ALT (SGPT) 12   9    Albumin/Globulin Ratio 1.4   1.4    BUN/Creatinine Ratio 21.4  17.6  20.4    Anion Gap 11.0  16.0  14.0      CBC          6/14/2025    03:03 6/15/2025    17:41 6/18/2025    13:25   CBC   WBC 8.95  10.29  10.17    RBC 4.26  4.83  4.65    Hemoglobin 12.4  14.1  13.6    Hematocrit 37.2  42.4  40.2    MCV 87.3  87.8  86.5    MCH 29.1  29.2  29.2    MCHC 33.3  33.3  33.8    RDW 13.2  13.2  12.9    Platelets 278  320  288      TSH          5/19/2025    11:08   TSH   TSH 1.970          ECG 12 Lead    Date/Time: 7/31/2025 5:32 PM  Performed by: Jerome Acevedo APRN    Authorized by: Jerome Acevedo APRN  Comparison: compared with previous ECG from 6/18/2025  Similar to previous ECG  Rhythm: sinus rhythm  Rate: normal  BPM: 84  Conduction: right bundle branch block  QRS axis: normal    Clinical impression: abnormal EKG              Assessment and Plan   Diagnoses and all orders for this visit:    1. Complete heart block (Primary)  -     ECG 12 Lead    2.  Presence of leadless cardiac pacemaker  -     ECG 12 Lead          Plan:  - Plan for device interrogation in the device clinic today after office visit.  - Continue remote device monitoring.  - No medication changes at this time.           Follow Up   Return in about 3 months (around 10/31/2025).  Patient was given instructions and counseling regarding her condition or for health maintenance advice. Please see specific information pulled into the AVS if appropriate.     Part of this note may be an electronic transcription/translation of spoken language to printed text using the Dragon Dictation System.

## (undated) DEVICE — DIL COONS/TPR .038IN 22F 20CM

## (undated) DEVICE — PAD, DEFIB, ADULT, RADIOTRANS, PHYSIO: Brand: MEDLINE

## (undated) DEVICE — ST TB EXT STANDARDBORE 30IN

## (undated) DEVICE — TBG PRESS/MONITR FIX M/F LL A/ 48IN STRL

## (undated) DEVICE — PK CATH CARD 30 CA/4

## (undated) DEVICE — TISSUE RETRIEVAL SYSTEM: Brand: INZII RETRIEVAL SYSTEM

## (undated) DEVICE — APPL HEMO ENDO SURGICEL 2IN1 1P/U STRL

## (undated) DEVICE — STPCK 4WY ON/OFF VLV M/COLAR FIT 45PSI STRL

## (undated) DEVICE — CANN NASL ETCO2 LO/FLO A/

## (undated) DEVICE — SUT VIC 0 SUTUPAK TIES 18IN J906G

## (undated) DEVICE — STRIP,CLOSURE,WOUND,MEDI-STRIP,1/2X4: Brand: MEDLINE

## (undated) DEVICE — DEV SUT GRSPR CLOSUR 15CM 14G

## (undated) DEVICE — SYR LL TP 10ML STRL

## (undated) DEVICE — SYR LUERLOK 20CC BX/50

## (undated) DEVICE — MICRO HVTSA, 0.5G AND HVTSA SOURCEMARK PRODUCT CODE M1206 AND M1206-01: Brand: EXOFIN MICRO HVTSA, 0.5G

## (undated) DEVICE — SEAL

## (undated) DEVICE — ENDOPATH XCEL WITH OPTIVIEW TECHNOLOGY BLADELESS TROCARS WITH STABILITY SLEEVES: Brand: ENDOPATH XCEL OPTIVIEW

## (undated) DEVICE — GLV SURG SENSICARE W/ALOE PF LF 7.5 STRL

## (undated) DEVICE — SOL IRR NACL 0.9PCT BO 1000ML

## (undated) DEVICE — DAVINCI: Brand: MEDLINE INDUSTRIES, INC.

## (undated) DEVICE — BLADELESS OBTURATOR: Brand: WECK VISTA

## (undated) DEVICE — GW AMPLTZ SUPERSTIFF JTIP .035IN 180CM

## (undated) DEVICE — LIMB HOLDER, WRIST/ANKLE: Brand: DEROYAL

## (undated) DEVICE — SYR LUERLOK 30CC

## (undated) DEVICE — SOLIDIFIER LIQ LIQUILOC/PLUS W/TREAT 2000CC

## (undated) DEVICE — SHEATH INTRO MICRA HC 23F 55.7CM

## (undated) DEVICE — DRP C/ARM W/BAND W/CLIPS 41X74IN

## (undated) DEVICE — PATIENT RETURN ELECTRODE, SINGLE-USE, CONTACT QUALITY MONITORING, ADULT, WITH 9FT CORD, FOR PATIENTS WEIGING OVER 33LBS. (15KG): Brand: MEGADYNE

## (undated) DEVICE — KT CLN CLEANOR SCPE

## (undated) DEVICE — ARM DRAPE

## (undated) DEVICE — NEEDLE,22GX1.5",REG,BEVEL: Brand: MEDLINE

## (undated) DEVICE — SI AVANTI+ 8F STD W/GW  NO OBT: Brand: AVANTI

## (undated) DEVICE — TBG INSUFFLATION LUER LOCK: Brand: MEDLINE INDUSTRIES, INC.

## (undated) DEVICE — SUT MNCRYL 4/0 PS2 27IN UD MCP426H

## (undated) DEVICE — KT NDL GUIDE STRL 18GA